# Patient Record
Sex: FEMALE | Race: BLACK OR AFRICAN AMERICAN | NOT HISPANIC OR LATINO | Employment: FULL TIME | URBAN - METROPOLITAN AREA
[De-identification: names, ages, dates, MRNs, and addresses within clinical notes are randomized per-mention and may not be internally consistent; named-entity substitution may affect disease eponyms.]

---

## 2017-04-27 ENCOUNTER — PATIENT MESSAGE (OUTPATIENT)
Dept: OBSTETRICS AND GYNECOLOGY | Facility: CLINIC | Age: 27
End: 2017-04-27

## 2017-05-04 ENCOUNTER — TELEPHONE (OUTPATIENT)
Dept: OBSTETRICS AND GYNECOLOGY | Facility: CLINIC | Age: 27
End: 2017-05-04

## 2017-05-04 NOTE — TELEPHONE ENCOUNTER
----- Message from Carola Delgado sent at 5/4/2017 10:14 AM CDT -----  Contact: HARRISON ANDERSEN [[id number]]  _  1st Request  _  2nd Request  x_  3rd Request        Who: HARRISON ANDERSEN [[id number]]    Why: Pt states she would like to schedule her annual exam.     What Number to Call Back: [phone]    When to Expect a call back: (Before the end of the day)   -- if call after 3:00 call back will be tomorrow.

## 2017-05-29 ENCOUNTER — OFFICE VISIT (OUTPATIENT)
Dept: OBSTETRICS AND GYNECOLOGY | Facility: CLINIC | Age: 27
End: 2017-05-29
Payer: COMMERCIAL

## 2017-05-29 VITALS
SYSTOLIC BLOOD PRESSURE: 102 MMHG | WEIGHT: 126.31 LBS | HEIGHT: 66 IN | DIASTOLIC BLOOD PRESSURE: 70 MMHG | BODY MASS INDEX: 20.3 KG/M2

## 2017-05-29 DIAGNOSIS — Z01.419 ENCOUNTER FOR GYNECOLOGICAL EXAMINATION WITHOUT ABNORMAL FINDING: Primary | ICD-10-CM

## 2017-05-29 DIAGNOSIS — B00.9 HSV INFECTION: ICD-10-CM

## 2017-05-29 PROCEDURE — 99395 PREV VISIT EST AGE 18-39: CPT | Mod: S$GLB,,, | Performed by: OBSTETRICS & GYNECOLOGY

## 2017-05-29 PROCEDURE — 99999 PR PBB SHADOW E&M-EST. PATIENT-LVL III: CPT | Mod: PBBFAC,,, | Performed by: OBSTETRICS & GYNECOLOGY

## 2017-05-29 RX ORDER — MEDROXYPROGESTERONE ACETATE 150 MG/ML
INJECTION, SUSPENSION INTRAMUSCULAR
Qty: 1 ML | Refills: 3 | Status: SHIPPED | OUTPATIENT
Start: 2017-05-29 | End: 2018-05-25 | Stop reason: SDUPTHER

## 2017-05-29 RX ORDER — MEDROXYPROGESTERONE ACETATE 150 MG/ML
INJECTION, SUSPENSION INTRAMUSCULAR
Refills: 3 | COMMUNITY
Start: 2017-03-25 | End: 2017-05-29

## 2017-05-29 RX ORDER — TETANUS TOXOID, REDUCED DIPHTHERIA TOXOID AND ACELLULAR PERTUSSIS VACCINE, ADSORBED 5; 2.5; 8; 8; 2.5 [IU]/.5ML; [IU]/.5ML; UG/.5ML; UG/.5ML; UG/.5ML
SUSPENSION INTRAMUSCULAR
Refills: 0 | COMMUNITY
Start: 2017-02-26 | End: 2018-05-25

## 2017-05-29 RX ORDER — VALACYCLOVIR HYDROCHLORIDE 500 MG/1
TABLET, FILM COATED ORAL
Qty: 30 TABLET | Refills: 1 | Status: SHIPPED | OUTPATIENT
Start: 2017-05-29 | End: 2018-05-25 | Stop reason: SDUPTHER

## 2017-05-29 NOTE — PROGRESS NOTES
"CC: Well woman exam    Lela Garcia is a 27 y.o. female  presents for a well woman exam.  She has no issues, problems, or complaints.  Content with Depo and prefers to continue.  Takes Valtrex prn for outbreaks.  Needs refill.    Last pap 2016--normal    Past Medical History:   Diagnosis Date    Seasonal allergies        Past Surgical History:   Procedure Laterality Date    PELVIC LAPAROSCOPY      removal of IUD       OB History    Para Term  AB Living   0        SAB TAB Ectopic Multiple Live Births                    Family History   Problem Relation Age of Onset    Hypertension Maternal Grandmother     Heart disease Maternal Grandfather     Breast cancer Paternal Grandmother     Hypertension Paternal Grandmother     Glaucoma Paternal Grandmother     Diabetes Paternal Grandmother     Heart disease Paternal Grandmother     Hypertension Mother     Glaucoma Mother     Diabetes Mother     Cancer Neg Hx     Colon cancer Neg Hx     Ovarian cancer Neg Hx        Social History   Substance Use Topics    Smoking status: Never Smoker    Smokeless tobacco: Never Used    Alcohol use Yes      Comment: occasional       /70   Ht 5' 6" (1.676 m)   Wt 57.3 kg (126 lb 5.2 oz)   BMI 20.39 kg/m²     ROS:  GENERAL: Denies weight gain or weight loss. Feeling well overall.   SKIN: Denies rash or lesions.   HEAD: Denies head injury or headache.   NODES: Denies enlarged lymph nodes.   CHEST: Denies chest pain or shortness of breath.   CARDIOVASCULAR: Denies palpitations or left sided chest pain.   ABDOMEN: No abdominal pain, constipation, diarrhea, nausea, vomiting or rectal bleeding.   URINARY: No frequency, dysuria, hematuria, or burning on urination.  REPRODUCTIVE: See HPI.   BREASTS: The patient performs breast self-examination and denies pain, lumps, or nipple discharge.   HEMATOLOGIC: No easy bruisability or excessive bleeding.  MUSCULOSKELETAL: Denies joint pain or swelling. "   NEUROLOGIC: Denies syncope or weakness.   PSYCHIATRIC: Denies depression, anxiety or mood swings.    Physical Exam:  APPEARANCE: Well nourished, well developed, in no acute distress.  AFFECT: WNL, alert and oriented x 3  SKIN: No acne or hirsutism  NECK: Neck symmetric without masses or thyromegaly  NODES: No inguinal, cervical, axillary, or femoral lymph node enlargement  CHEST: Good respiratory effect  ABDOMEN: Soft.  No tenderness or masses.  No hepatosplenomegaly.  No hernias.  BREASTS: Symmetrical, no skin changes or visible lesions.  No palpable masses, nipple discharge bilaterally.  Fibrocystic changes equal and bilateral.  PELVIC: Normal external genitalia without lesions.  Normal hair distribution.  Adequate perineal body, normal urethral meatus.  Vagina moist and well rugated without lesions or discharge.  Cervix pink, without lesions, discharge or tenderness.  No significant cystocele or rectocele.  Bimanual exam shows uterus to be normal size, regular, mobile and nontender.  Adnexa without masses or tenderness.    EXTREMITIES: No edema.    ASSESSMENT AND PLAN  1. Encounter for gynecological examination without abnormal finding     2. HSV infection  valacyclovir (VALTREX) 500 MG tablet       Patient was counseled today on diet, exercise and A.C.S. Pap guidelines and recommendations for yearly pelvic exams, mammograms and monthly self breast exams; to see her PCP for other health maintenance. Next pap due 2019.    Return in about 1 year (around 5/29/2018).

## 2018-05-25 ENCOUNTER — OFFICE VISIT (OUTPATIENT)
Dept: OBSTETRICS AND GYNECOLOGY | Facility: CLINIC | Age: 28
End: 2018-05-25
Payer: COMMERCIAL

## 2018-05-25 ENCOUNTER — LAB VISIT (OUTPATIENT)
Dept: LAB | Facility: OTHER | Age: 28
End: 2018-05-25
Payer: COMMERCIAL

## 2018-05-25 VITALS
WEIGHT: 121.69 LBS | DIASTOLIC BLOOD PRESSURE: 64 MMHG | BODY MASS INDEX: 19.56 KG/M2 | SYSTOLIC BLOOD PRESSURE: 100 MMHG | HEIGHT: 66 IN

## 2018-05-25 DIAGNOSIS — Z11.3 SCREEN FOR STD (SEXUALLY TRANSMITTED DISEASE): ICD-10-CM

## 2018-05-25 DIAGNOSIS — Z01.419 ENCOUNTER FOR GYNECOLOGICAL EXAMINATION WITHOUT ABNORMAL FINDING: ICD-10-CM

## 2018-05-25 DIAGNOSIS — B00.9 HSV INFECTION: ICD-10-CM

## 2018-05-25 DIAGNOSIS — Z78.9 USES DEPO-PROVERA AS PRIMARY BIRTH CONTROL METHOD: ICD-10-CM

## 2018-05-25 DIAGNOSIS — Z01.419 ENCOUNTER FOR GYNECOLOGICAL EXAMINATION WITHOUT ABNORMAL FINDING: Primary | ICD-10-CM

## 2018-05-25 PROCEDURE — 86703 HIV-1/HIV-2 1 RESULT ANTBDY: CPT

## 2018-05-25 PROCEDURE — 99395 PREV VISIT EST AGE 18-39: CPT | Mod: SA,S$GLB,, | Performed by: NURSE PRACTITIONER

## 2018-05-25 PROCEDURE — 80074 ACUTE HEPATITIS PANEL: CPT

## 2018-05-25 PROCEDURE — 87491 CHLMYD TRACH DNA AMP PROBE: CPT

## 2018-05-25 PROCEDURE — 87510 GARDNER VAG DNA DIR PROBE: CPT

## 2018-05-25 PROCEDURE — 99999 PR PBB SHADOW E&M-EST. PATIENT-LVL III: CPT | Mod: PBBFAC,,, | Performed by: NURSE PRACTITIONER

## 2018-05-25 PROCEDURE — 87480 CANDIDA DNA DIR PROBE: CPT

## 2018-05-25 PROCEDURE — 36415 COLL VENOUS BLD VENIPUNCTURE: CPT

## 2018-05-25 PROCEDURE — 86592 SYPHILIS TEST NON-TREP QUAL: CPT

## 2018-05-25 RX ORDER — MEDROXYPROGESTERONE ACETATE 150 MG/ML
INJECTION, SUSPENSION INTRAMUSCULAR
Qty: 1 ML | Refills: 3 | Status: SHIPPED | OUTPATIENT
Start: 2018-05-25 | End: 2019-05-27

## 2018-05-25 RX ORDER — MEDROXYPROGESTERONE ACETATE 150 MG/ML
INJECTION, SUSPENSION INTRAMUSCULAR
Refills: 1 | COMMUNITY
Start: 2018-03-09 | End: 2018-05-25

## 2018-05-25 RX ORDER — VALACYCLOVIR HYDROCHLORIDE 500 MG/1
TABLET, FILM COATED ORAL
Qty: 30 TABLET | Refills: 1 | Status: SHIPPED | OUTPATIENT
Start: 2018-05-25 | End: 2019-05-27 | Stop reason: SDUPTHER

## 2018-05-25 NOTE — PROGRESS NOTES
CC: Annual  HPI: Pt is a 28 y.o.  female who presents for routine annual exam. She uses depo provera for contraception. She does want STD screening, including labs. No problems today. Needs depo and valtrex refill.     Last pap in 2016 WNL    ROS:  GENERAL: Feeling well overall. Denies fever or chills.   SKIN: Denies rash or lesions.   HEAD: Denies head injury or headache.   NODES: Denies enlarged lymph nodes.   CHEST: Denies chest pain or shortness of breath.   CARDIOVASCULAR: Denies palpitations or left sided chest pain.   ABDOMEN: No abdominal pain, constipation, diarrhea, nausea, vomiting or rectal bleeding.   URINARY: No dysuria, hematuria, or burning on urination.  REPRODUCTIVE: See HPI.   BREASTS: Denies pain, lumps, or nipple discharge.   HEMATOLOGIC: No easy bruisability or excessive bleeding.   MUSCULOSKELETAL: Denies joint pain or swelling.   NEUROLOGIC: Denies syncope or weakness.   PSYCHIATRIC: Denies depression, anxiety or mood swings.    PE:   APPEARANCE: Well nourished, well developed, Black or  female in no acute distress.  NODES: no cervical, supraclavicular, or inguinal lymphadenopathy  BREASTS: Symmetrical, no skin changes or visible lesions. No palpable masses, nipple discharge or adenopathy bilaterally.  ABDOMEN: Soft. No tenderness or masses. No distention. No hernias palpated. No CVA tenderness.  VULVA: No lesions. Normal external female genitalia.  URETHRAL MEATUS: Normal size and location, no lesions, no prolapse.  URETHRA: No masses, tenderness, or prolapse.  VAGINA: Moist. No lesions or lacerations noted. No abnormal discharge present. No odor present.   CERVIX: No lesions or discharge. No cervical motion tenderness.   UTERUS: Normal size, regular shape, mobile, non-tender.  ADNEXA: No tenderness. No fullness or masses palpated in the adnexal regions.   ANUS PERINEUM: Normal.      Diagnosis:  1. Encounter for gynecological examination without abnormal finding    2. Uses  Depo-Provera as primary birth control method    3. HSV infection    4. Screen for STD (sexually transmitted disease)        Plan:     Orders Placed This Encounter    C. trachomatis/N. gonorrhoeae by AMP DNA Cervix    Vaginosis Screen by DNA Probe    HIV-1 and HIV-2 antibodies    RPR    Hepatitis panel, acute    valACYclovir (VALTREX) 500 MG tablet    medroxyPROGESTERone (DEPO-PROVERA) 150 mg/mL Syrg     1. Full std panel  2. Pap current  3. Refill of depo and valtrex  -Depo Provera use and potential side effects including altered menstrual bleeding pattern, weight gain, increased fracture risk due to reversible osteoporosis;  -osteoporosis prevention, calcium supplementation, regular weight bearing exercise;  -STDs and prevention, including the HPV vaccine     Patient was counseled today on the new ACS guidelines for cervical cytology screening as well as the current recommendations for breast cancer screening. She was counseled to follow up with her PCP for other routine health maintenance. Counseling session lasted approximately 10 minutes, and all her questions were answered.    Follow-up with me in 1 year for routine exam; pap in 1 year.

## 2018-05-28 LAB
C TRACH DNA SPEC QL NAA+PROBE: NOT DETECTED
CANDIDA RRNA VAG QL PROBE: POSITIVE
G VAGINALIS RRNA GENITAL QL PROBE: POSITIVE
HAV IGM SERPL QL IA: NEGATIVE
HBV CORE IGM SERPL QL IA: NEGATIVE
HBV SURFACE AG SERPL QL IA: NEGATIVE
HCV AB SERPL QL IA: NEGATIVE
HIV 1+2 AB+HIV1 P24 AG SERPL QL IA: NEGATIVE
N GONORRHOEA DNA SPEC QL NAA+PROBE: NOT DETECTED
RPR SER QL: NORMAL
T VAGINALIS RRNA GENITAL QL PROBE: NEGATIVE

## 2018-05-29 RX ORDER — FLUCONAZOLE 150 MG/1
150 TABLET ORAL ONCE
Qty: 1 TABLET | Refills: 1 | Status: SHIPPED | OUTPATIENT
Start: 2018-05-29 | End: 2018-05-29

## 2018-05-29 RX ORDER — TINIDAZOLE 500 MG/1
2 TABLET ORAL DAILY
Qty: 8 TABLET | Refills: 0 | Status: SHIPPED | OUTPATIENT
Start: 2018-05-29 | End: 2018-05-31

## 2018-06-15 ENCOUNTER — HOSPITAL ENCOUNTER (OUTPATIENT)
Dept: RADIOLOGY | Facility: OTHER | Age: 28
Discharge: HOME | End: 2018-06-15
Attending: INTERNAL MEDICINE
Payer: COMMERCIAL

## 2018-06-15 ENCOUNTER — OFFICE VISIT (OUTPATIENT)
Dept: INTERNAL MEDICINE | Facility: CLINIC | Age: 28
End: 2018-06-15
Attending: INTERNAL MEDICINE
Payer: COMMERCIAL

## 2018-06-15 VITALS
WEIGHT: 122.13 LBS | DIASTOLIC BLOOD PRESSURE: 70 MMHG | OXYGEN SATURATION: 98 % | TEMPERATURE: 98 F | HEART RATE: 85 BPM | BODY MASS INDEX: 19.63 KG/M2 | SYSTOLIC BLOOD PRESSURE: 120 MMHG | HEIGHT: 66 IN

## 2018-06-15 DIAGNOSIS — R07.89 CHEST WALL PAIN, CHRONIC: ICD-10-CM

## 2018-06-15 DIAGNOSIS — G89.29 CHEST WALL PAIN, CHRONIC: ICD-10-CM

## 2018-06-15 DIAGNOSIS — Z00.00 ANNUAL PHYSICAL EXAM: Primary | ICD-10-CM

## 2018-06-15 PROCEDURE — 99999 PR PBB SHADOW E&M-EST. PATIENT-LVL IV: CPT | Mod: PBBFAC,,, | Performed by: INTERNAL MEDICINE

## 2018-06-15 PROCEDURE — 71046 X-RAY EXAM CHEST 2 VIEWS: CPT | Mod: 26,,, | Performed by: RADIOLOGY

## 2018-06-15 PROCEDURE — 71110 X-RAY EXAM RIBS BIL 3 VIEWS: CPT | Mod: 26,,, | Performed by: RADIOLOGY

## 2018-06-15 PROCEDURE — 71110 X-RAY EXAM RIBS BIL 3 VIEWS: CPT | Mod: TC,FY

## 2018-06-15 PROCEDURE — 99385 PREV VISIT NEW AGE 18-39: CPT | Mod: S$GLB,,, | Performed by: INTERNAL MEDICINE

## 2018-06-15 PROCEDURE — 71046 X-RAY EXAM CHEST 2 VIEWS: CPT | Mod: TC,FY

## 2018-06-15 RX ORDER — CYCLOBENZAPRINE HCL 5 MG
5 TABLET ORAL 3 TIMES DAILY PRN
Qty: 30 TABLET | Refills: 0 | Status: SHIPPED | OUTPATIENT
Start: 2018-06-15 | End: 2018-06-25

## 2018-06-15 NOTE — PROGRESS NOTES
Subjective:       Patient ID: Lela Garcia is a 28 y.o. female.    Chief Complaint: Establish Care and Chest Pain    Here to establish care    3 year Hx of right chest wall pain that has become more frequent and ijn the last 2weeks more constant. Before she only felt pain when she would lean over to the right and lean on herself in a certain way. She states she was able to participate in daily activities and exercise without any regular or consistent trigger. She denies any alleviating factors. She denies association with PO intake. She denies exacerbation by coughing, sneezing, laughing, or having a BM. 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 Her pains started prior to this. She reports for the past 2 weeks her area of pain has become more constant in nature and is much more sensitive than normal. She denies night sweats, unexplained weight loss, N/v, diarrhea, easy bruising or bleeding.      She reports regular allergies for which she takes zyrtec near daily for rhinorrhea, itchy eyes. She denies rash.        Review of Systems   Constitutional: Negative for activity change and unexpected weight change.   HENT: Negative for hearing loss, rhinorrhea and trouble swallowing.    Eyes: Negative for discharge and visual disturbance.   Respiratory: Negative for chest tightness and wheezing.    Cardiovascular: Positive for chest pain. Negative for palpitations.   Gastrointestinal: Negative for blood in stool, constipation, diarrhea and vomiting.   Endocrine: Negative for polydipsia and polyuria.   Genitourinary: Negative for difficulty urinating, dysuria, hematuria and menstrual problem.   Musculoskeletal: Negative for arthralgias, joint swelling and neck pain.   Neurological: Negative for weakness and headaches.   Psychiatric/Behavioral: Negative for confusion and dysphoric mood.       Past Medical History:   Diagnosis Date    Seasonal allergies      Past Surgical History:   Procedure  "Laterality Date    PELVIC LAPAROSCOPY      removal of IUD     Family History   Problem Relation Age of Onset    Hypertension Maternal Grandmother     Heart disease Maternal Grandfather     Breast cancer Paternal Grandmother     Hypertension Paternal Grandmother     Glaucoma Paternal Grandmother     Diabetes Paternal Grandmother     Heart disease Paternal Grandmother     Hypertension Mother     Glaucoma Mother     Diabetes Mother     Cancer Neg Hx     Colon cancer Neg Hx     Ovarian cancer Neg Hx      Social History     Social History    Marital status: Single     Spouse name: N/A    Number of children: N/A    Years of education: N/A     Occupational History    Not on file.     Social History Main Topics    Smoking status: Never Smoker    Smokeless tobacco: Never Used    Alcohol use Yes      Comment: occasional    Drug use: No    Sexual activity: Yes     Partners: Male     Birth control/ protection: Injection     Other Topics Concern    Not on file     Social History Narrative    No narrative on file         Objective:      Vitals:    06/15/18 1427   BP: 120/70   BP Location: Right arm   Pulse: 85   Temp: 98.4 °F (36.9 °C)   SpO2: 98%   Weight: 55.4 kg (122 lb 2.2 oz)   Height: 5' 6" (1.676 m)      Physical Exam   Constitutional: She is oriented to person, place, and time. She appears well-developed and well-nourished. No distress.   HENT:   Head: Normocephalic and atraumatic.   Mouth/Throat: Oropharynx is clear and moist. No oropharyngeal exudate.   Eyes: Conjunctivae and EOM are normal. Pupils are equal, round, and reactive to light. No scleral icterus.   Neck: No thyromegaly present.   Cardiovascular: Normal rate, regular rhythm and normal heart sounds.    No murmur heard.  Pulmonary/Chest: Effort normal and breath sounds normal. She has no wheezes. She has no rales.       Abdominal: Soft. She exhibits no distension. There is no tenderness.   Musculoskeletal: She exhibits no edema or " tenderness.   Lymphadenopathy:     She has no cervical adenopathy.   Neurological: She is alert and oriented to person, place, and time.   Skin: Skin is warm and dry.   Psychiatric: She has a normal mood and affect. Her behavior is normal.       Assessment:       1. Annual physical exam    2. Chest wall pain, chronic        Plan:       Lela was seen today for establish care and chest pain.    Diagnoses and all orders for this visit:    Annual physical exam  -     CBC auto differential; Future  -     Comprehensive metabolic panel; Future  -     TSH; Future  -     Lipid panel; Future  -     Hemoglobin A1c; Future    Chest wall pain, chronic  Quite tender on exam. Hx most consistent with MSK etiology though not entirely clear. No pleuritic or chronic lung symptoms. Likely posture related core muscle Rec scheduled ibuprofen 3-4 tablets 3 times a day for minimum of 2 weeks. H2 blocker use discussed. Flexeril prn. Start with plain films then CT of chest if inconclusive. No need for immediate return visit unless symptoms change. Office and Emergency Department prompts discussed. Will be sequences of imaging, possibly additional blood work for auto immune causes and if symptoms unresolved or unrevealing pt to see ortho +/- rheum   -     X-Ray Chest PA And Lateral; Future  -     X-Ray Ribs 3 Views Bilateral; Future  -     cyclobenzaprine (FLEXERIL) 5 MG tablet; Take 1 tablet (5 mg total) by mouth 3 (three) times daily as needed.       RTC in 6 months or sooner prn.          Side effects of medication(s) were discussed in detail and patient voiced understanding.  Patient will call back for any issues or complications.

## 2018-06-18 ENCOUNTER — TELEPHONE (OUTPATIENT)
Dept: INTERNAL MEDICINE | Facility: CLINIC | Age: 28
End: 2018-06-18

## 2018-06-18 DIAGNOSIS — R07.82 INTERCOSTAL PAIN: ICD-10-CM

## 2018-06-18 NOTE — TELEPHONE ENCOUNTER
Pt is requesting ct scan. Order is not placed for this. Per notes pt to f/u with ct scan based off of xray results. Please advise and auth if appropriate.

## 2018-06-19 NOTE — TELEPHONE ENCOUNTER
Informed pt that her ct orders have been enter. Pt appt have already been schedule for June 20,2018 got 130pm

## 2018-06-20 ENCOUNTER — LAB VISIT (OUTPATIENT)
Dept: LAB | Facility: OTHER | Age: 28
End: 2018-06-20
Attending: INTERNAL MEDICINE
Payer: COMMERCIAL

## 2018-06-20 ENCOUNTER — TELEPHONE (OUTPATIENT)
Dept: INTERNAL MEDICINE | Facility: CLINIC | Age: 28
End: 2018-06-20

## 2018-06-20 DIAGNOSIS — R07.82 INTERCOSTAL PAIN: ICD-10-CM

## 2018-06-20 DIAGNOSIS — R07.81 RIB PAIN ON RIGHT SIDE: ICD-10-CM

## 2018-06-20 LAB
CRP SERPL-MCNC: 0.2 MG/L
ERYTHROCYTE [SEDIMENTATION RATE] IN BLOOD: 10 MM/HR

## 2018-06-20 PROCEDURE — 86039 ANTINUCLEAR ANTIBODIES (ANA): CPT

## 2018-06-20 PROCEDURE — 86038 ANTINUCLEAR ANTIBODIES: CPT

## 2018-06-20 PROCEDURE — 86235 NUCLEAR ANTIGEN ANTIBODY: CPT | Mod: 59

## 2018-06-20 PROCEDURE — 86431 RHEUMATOID FACTOR QUANT: CPT

## 2018-06-20 PROCEDURE — 85651 RBC SED RATE NONAUTOMATED: CPT

## 2018-06-20 PROCEDURE — 86140 C-REACTIVE PROTEIN: CPT

## 2018-06-20 PROCEDURE — 36415 COLL VENOUS BLD VENIPUNCTURE: CPT

## 2018-06-20 PROCEDURE — 86200 CCP ANTIBODY: CPT

## 2018-06-20 NOTE — TELEPHONE ENCOUNTER
"----- Message from Niki Alexandra sent at 6/20/2018 12:01 PM CDT -----  Contact: HARRISON ANDERSEN [[id number]]          Name of Who is Calling: HARRISON ANDERSEN [[id number]]      What is the request in detail:   Patient called requesting to schedule her CT Scan at [facility]. Says, "she would like to have the codes for this CT Scan sent there."  Please give a call back at your earliest convenience.     THANKS!      Can the clinic reply by [facility]:  No      What Number to Call Back: HARRISON ANDERSEN / # [phone]                                    "

## 2018-06-20 NOTE — TELEPHONE ENCOUNTER
Pt wants to have ct scan done at [facility]. External order for ct chest scan pended. Please advise and auth

## 2018-06-21 LAB
ANA SER QL IF: POSITIVE
ANA TITR SER IF: NORMAL {TITER}
CCP AB SER IA-ACNC: <0.5 U/ML
RHEUMATOID FACT SERPL-ACNC: <10 IU/ML

## 2018-06-21 NOTE — TELEPHONE ENCOUNTER
"----- Message from Lyndsey Michaels sent at 6/21/2018  8:44 AM CDT -----  Contact: pt  Name of Who is Calling: HARRISON ANDERSEN [[id number]]        What is the request in detail:   Patient called requesting to schedule her CT Scan at [facility]. Says, "she would like to have the codes for this CT Scan sent there."  Please give a call back at your earliest convenience.     THANKS!        Can the clinic reply by [facility]:  No        What Number to Call Back: HARRISON ANDERSEN / # [phone]     "

## 2018-06-22 ENCOUNTER — PATIENT MESSAGE (OUTPATIENT)
Dept: INTERNAL MEDICINE | Facility: CLINIC | Age: 28
End: 2018-06-22

## 2018-06-23 LAB
ANTI SM ANTIBODY: 1.91 EU
ANTI SM/RNP ANTIBODY: 8.77 EU
ANTI-SM INTERPRETATION: NEGATIVE
ANTI-SM/RNP INTERPRETATION: NEGATIVE
ANTI-SSA ANTIBODY: 6.51 EU
ANTI-SSA INTERPRETATION: NEGATIVE
ANTI-SSB ANTIBODY: 4.89 EU
ANTI-SSB INTERPRETATION: NEGATIVE
DSDNA AB SER-ACNC: NORMAL [IU]/ML

## 2018-06-25 ENCOUNTER — TELEPHONE (OUTPATIENT)
Dept: INTERNAL MEDICINE | Facility: CLINIC | Age: 28
End: 2018-06-25

## 2018-06-25 ENCOUNTER — PATIENT MESSAGE (OUTPATIENT)
Dept: INTERNAL MEDICINE | Facility: CLINIC | Age: 28
End: 2018-06-25

## 2018-06-25 NOTE — TELEPHONE ENCOUNTER
----- Message from Verito Rodriguez sent at 6/25/2018  9:21 AM CDT -----  Contact: pt            Name of Who is Calling: pt      What is the request in detail: pt needs ct scan order sent to [facility]. Following up on if order was faxed yet.  Please call pt      Can the clinic reply by [facility]: yes      What Number to Call Back if not in [facility]: [phone]

## 2018-06-25 NOTE — TELEPHONE ENCOUNTER
Printed out the ct of the chest and faxed it to [facility]. Sent a message via the portal to let pt know

## 2018-07-15 ENCOUNTER — PATIENT MESSAGE (OUTPATIENT)
Dept: INTERNAL MEDICINE | Facility: CLINIC | Age: 28
End: 2018-07-15

## 2018-11-14 ENCOUNTER — PATIENT MESSAGE (OUTPATIENT)
Dept: OBSTETRICS AND GYNECOLOGY | Facility: CLINIC | Age: 28
End: 2018-11-14

## 2018-11-14 ENCOUNTER — TELEPHONE (OUTPATIENT)
Dept: OBSTETRICS AND GYNECOLOGY | Facility: CLINIC | Age: 28
End: 2018-11-14

## 2018-11-14 RX ORDER — METRONIDAZOLE 7.5 MG/G
1 GEL VAGINAL NIGHTLY
Qty: 1 G | Refills: 0 | Status: SHIPPED | OUTPATIENT
Start: 2018-11-14 | End: 2018-11-21

## 2018-11-29 ENCOUNTER — PATIENT MESSAGE (OUTPATIENT)
Dept: INTERNAL MEDICINE | Facility: CLINIC | Age: 28
End: 2018-11-29

## 2018-12-14 ENCOUNTER — HOSPITAL ENCOUNTER (OUTPATIENT)
Dept: CARDIOLOGY | Facility: OTHER | Age: 28
Discharge: HOME | End: 2018-12-14
Attending: INTERNAL MEDICINE
Payer: COMMERCIAL

## 2018-12-14 ENCOUNTER — HOSPITAL ENCOUNTER (OUTPATIENT)
Dept: RADIOLOGY | Facility: OTHER | Age: 28
Discharge: HOME | End: 2018-12-14
Attending: INTERNAL MEDICINE
Payer: COMMERCIAL

## 2018-12-14 ENCOUNTER — OFFICE VISIT (OUTPATIENT)
Dept: INTERNAL MEDICINE | Facility: CLINIC | Age: 28
End: 2018-12-14
Attending: INTERNAL MEDICINE
Payer: COMMERCIAL

## 2018-12-14 VITALS
HEIGHT: 66 IN | DIASTOLIC BLOOD PRESSURE: 68 MMHG | OXYGEN SATURATION: 98 % | WEIGHT: 123.69 LBS | BODY MASS INDEX: 19.88 KG/M2 | HEART RATE: 105 BPM | SYSTOLIC BLOOD PRESSURE: 118 MMHG

## 2018-12-14 DIAGNOSIS — M79.10 MYALGIA: ICD-10-CM

## 2018-12-14 DIAGNOSIS — R53.83 FATIGUE, UNSPECIFIED TYPE: ICD-10-CM

## 2018-12-14 DIAGNOSIS — R06.09 DOE (DYSPNEA ON EXERTION): ICD-10-CM

## 2018-12-14 DIAGNOSIS — R76.8 POSITIVE ANA (ANTINUCLEAR ANTIBODY): ICD-10-CM

## 2018-12-14 DIAGNOSIS — Z00.00 ANNUAL PHYSICAL EXAM: Primary | ICD-10-CM

## 2018-12-14 LAB
AORTIC ROOT ANNULUS: 2.15 CM
AORTIC VALVE CUSP SEPERATION: 1.37 CM
ASCENDING AORTA: 2.31 CM
AV INDEX (PROSTH): 0.88
AV MEAN GRADIENT: 5.09 MMHG
AV PEAK GRADIENT: 8.88 MMHG
AV VALVE AREA: 2.67 CM2
BSA FOR ECHO PROCEDURE: 1.62 M2
CV ECHO LV RWT: 0.29 CM
DOP CALC AO PEAK VEL: 1.49 M/S
DOP CALC AO VTI: 29.67 CM
DOP CALC LVOT AREA: 3.05 CM2
DOP CALC LVOT DIAMETER: 1.97 CM
DOP CALC LVOT STROKE VOLUME: 79.33 CM3
DOP CALCLVOT PEAK VEL VTI: 26.04 CM
E WAVE DECELERATION TIME: 223.88 MSEC
E/A RATIO: 1.47
E/E' RATIO: 5.48
ECHO LV POSTERIOR WALL: 0.64 CM (ref 0.6–1.1)
FRACTIONAL SHORTENING: 35 % (ref 28–44)
INTERVENTRICULAR SEPTUM: 0.58 CM (ref 0.6–1.1)
IVRT: 0.07 MSEC
LA MAJOR: 4.08 CM
LA MINOR: 4.73 CM
LA WIDTH: 3.98 CM
LEFT ATRIUM SIZE: 3.02 CM
LEFT ATRIUM VOLUME INDEX: 27.4 ML/M2
LEFT ATRIUM VOLUME: 44.76 CM3
LEFT INTERNAL DIMENSION IN SYSTOLE: 2.88 CM (ref 2.1–4)
LEFT VENTRICLE DIASTOLIC VOLUME INDEX: 55.08 ML/M2
LEFT VENTRICLE DIASTOLIC VOLUME: 89.82 ML
LEFT VENTRICLE MASS INDEX: 48.4 G/M2
LEFT VENTRICLE SYSTOLIC VOLUME INDEX: 19.5 ML/M2
LEFT VENTRICLE SYSTOLIC VOLUME: 31.75 ML
LEFT VENTRICULAR INTERNAL DIMENSION IN DIASTOLE: 4.45 CM (ref 3.5–6)
LEFT VENTRICULAR MASS: 78.94 G
LV LATERAL E/E' RATIO: 5
LV SEPTAL E/E' RATIO: 6.07
MV PEAK A VEL: 0.58 M/S
MV PEAK E VEL: 0.85 M/S
PISA TR MAX VEL: 2.79 M/S
PULM VEIN S/D RATIO: 1.17
PV PEAK D VEL: 0.69 M/S
PV PEAK S VEL: 0.81 M/S
PV PEAK VELOCITY: 1.25 CM/S
RA MAJOR: 3.68 CM
RA WIDTH: 2.81 CM
RIGHT VENTRICULAR END-DIASTOLIC DIMENSION: 3.5 CM
RV TISSUE DOPPLER FREE WALL SYSTOLIC VELOCITY 1 (APICAL 4 CHAMBER VIEW): 16.76 M/S
SINUS: 2.43 CM
STJ: 2.06 CM
TDI LATERAL: 0.17
TDI SEPTAL: 0.14
TDI: 0.16
TR MAX PG: 31.14 MMHG
TRICUSPID ANNULAR PLANE SYSTOLIC EXCURSION: 2.21 CM

## 2018-12-14 PROCEDURE — 71046 X-RAY EXAM CHEST 2 VIEWS: CPT | Mod: TC,FY

## 2018-12-14 PROCEDURE — 99395 PREV VISIT EST AGE 18-39: CPT | Mod: S$GLB,,, | Performed by: INTERNAL MEDICINE

## 2018-12-14 PROCEDURE — 71046 X-RAY EXAM CHEST 2 VIEWS: CPT | Mod: 26,,, | Performed by: RADIOLOGY

## 2018-12-14 PROCEDURE — 93010 ELECTROCARDIOGRAM REPORT: CPT | Mod: ,,, | Performed by: INTERNAL MEDICINE

## 2018-12-14 PROCEDURE — 99999 PR PBB SHADOW E&M-EST. PATIENT-LVL III: CPT | Mod: PBBFAC,,, | Performed by: INTERNAL MEDICINE

## 2018-12-14 PROCEDURE — 93306 TTE W/DOPPLER COMPLETE: CPT

## 2018-12-14 PROCEDURE — 93306 TTE W/DOPPLER COMPLETE: CPT | Mod: 26,,, | Performed by: INTERNAL MEDICINE

## 2018-12-14 PROCEDURE — 93005 ELECTROCARDIOGRAM TRACING: CPT

## 2018-12-14 RX ORDER — CLOBETASOL PROPIONATE 0.5 MG/G
CREAM TOPICAL 2 TIMES DAILY
Qty: 30 G | Refills: 1 | Status: SHIPPED | OUTPATIENT
Start: 2018-12-14 | End: 2019-01-30

## 2018-12-14 NOTE — LETTER
December 14, 2018      [facility]  [address]  [facility] [address]  Phone: [phone]  Fax: [phone]       Patient: Lela Garcia   YOB: 1990  Date of Visit: 12/14/2018    To Whom It May Concern:    Samanta Garcia  was at [facility] on 12/14/2018. He  may return to work/school on 12/15/18. If you have any questions or concerns, or if I can be of further assistance, please do not hesitate to contact me.    Sincerely,    Josse Swift MA

## 2018-12-14 NOTE — PROGRESS NOTES
Subjective:       Patient ID: Lela Garcia is a 28 y.o. female.    Chief Complaint: Rash (torso)    Here    1 week after thanksgiving she developed fatigue, nasal congestion, constant nausea, loose stools diffuse myalgias, and exercise intolerance. She has a hx of food allergies and has always attributed these types of episodes to dietary non adherence. 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 She reports getting SOB with warm ups of activities that she has been doing several times a week for several years. She denies CP, PND, orthopnea, pleuritic CP, chronic cough. + cold intolerance historically but has been very warm and reports times of night sweats in recent past. She has also develoepd rash of torso that is similar to rashes she has had before but is more extensive then normal.        Rash   This is a new problem. The current episode started 1 to 4 weeks ago. The problem has been gradually worsening since onset. The affected locations include thetorso. The rash is characterized by itchiness. She was exposed to a new medication, shellfish and unknown. Associated symptoms include diarrhea and fatigue. Pertinent negatives include no anorexia, congestion, cough, eye pain, facial edema, fever, joint pain, nail changes, rhinorrhea, shortness of breath, sore throat or vomiting. Past treatments include antihistamine. The treatment provided no relief. Her past medical history is significant for allergies, eczema and varicella. There is no history of asthma.       Review of Systems   Constitutional: Positive for fatigue. Negative for fever.   HENT: Negative for congestion, rhinorrhea and sore throat.    Eyes: Negative for pain.   Respiratory: Negative for cough and shortness of breath.    Gastrointestinal: Positive for diarrhea. Negative for anorexia and vomiting.   Musculoskeletal: Negative for joint pain.   Skin:  "Positive for rash. Negative for nail changes.       Objective:      Vitals:    12/14/18 0828   BP: 118/68   Pulse: 105   SpO2: 98%   Weight: 56.1 kg (123 lb 10.9 oz)   Height: 5' 6" (1.676 m)      Physical Exam   Constitutional: She is oriented to person, place, and time. She appears well-developed and well-nourished. No distress.   HENT:   Head: Normocephalic and atraumatic.   Mouth/Throat: Oropharynx is clear and moist. No oropharyngeal exudate.   Eyes: Conjunctivae and EOM are normal. Pupils are equal, round, and reactive to light. No scleral icterus.   Neck: No thyromegaly present.   Cardiovascular: Normal rate, regular rhythm and normal heart sounds.   No murmur heard.  Pulmonary/Chest: Effort normal and breath sounds normal. She has no wheezes. She has no rales.   Abdominal: Soft. She exhibits no distension. There is no tenderness.   Musculoskeletal: She exhibits no edema or tenderness.        Right shoulder: She exhibits no tenderness and no bony tenderness.        Left shoulder: She exhibits no tenderness and no bony tenderness.        Right elbow: She exhibits normal range of motion. No tenderness found.        Left elbow: She exhibits normal range of motion. No tenderness found.        Right hand: Normal sensation noted. Normal strength noted.        Left hand: Normal sensation noted. Normal strength noted.   Lymphadenopathy:     She has no cervical adenopathy.   Neurological: She is alert and oriented to person, place, and time.   Skin: Skin is warm and dry.        Psychiatric: She has a normal mood and affect. Her behavior is normal.       Assessment:       1. Annual physical exam    2. Positive CHRYSTAL (antinuclear antibody)    3. Fatigue, unspecified type    4. Myalgia    5. GENTILE (dyspnea on exertion)        Plan:       Lela was seen today for rash.    Diagnoses and all orders for this visit:    Annual physical exam  -     Lipid panel; Future  -     Comprehensive metabolic panel; Future  -     CBC auto " differential; Future  -     Hemoglobin A1c; Future  -     Lipid panel  -     Comprehensive metabolic panel  -     CBC auto differential  -     Hemoglobin A1c    Positive CHRYSTAL (antinuclear antibody)  Has appt with Dr Barron in < 1 month  -     URINALYSIS  -     CHRYSTAL; Future  -     Aldolase; Future  -     CK; Future  -     Sedimentation rate; Future  -     C-reactive protein; Future  -     CHRYSTAL  -     Aldolase  -     CK  -     Sedimentation rate  -     C-reactive protein  -     Aldolase; Future  -     CK; Future  -     C-reactive protein; Future  -     CHRYSTAL; Future  -     ANTI-SSB ANTIBODY; Future  -     ANTI -SSA ANTIBODY; Future  -     TSH; Future  -     Aldolase  -     CK  -     C-reactive protein  -     CHRYSTAL  -     ANTI-SSB ANTIBODY  -     ANTI -SSA ANTIBODY  -     TSH    Fatigue, unspecified type  -     URINALYSIS  -     TSH; Future  -     Comprehensive metabolic panel; Future  -     CBC auto differential; Future  -     Hemoglobin A1c; Future  -     TSH  -     Comprehensive metabolic panel  -     CBC auto differential  -     Hemoglobin A1c  -     URINALYSIS  -     Hepatitis C antibody; Future  -     HIV 1/2 Ag/Ab (4th Gen); Future  -     Hepatitis B surface antigen; Future  -     Hepatitis C antibody  -     HIV 1/2 Ag/Ab (4th Gen)  -     Hepatitis B surface antigen    Myalgia    GENTILE (dyspnea on exertion)  -     X-Ray Chest PA And Lateral; Future  -     SCHEDULED EKG 12-LEAD (to Muse); Future  -     Transthoracic echo (TTE) complete (Cupid Only); Future    Other orders  -     clobetasol (TEMOVATE) 0.05 % cream; Apply topically 2 (two) times daily.             Side effects of medication(s) were discussed in detail and patient voiced understanding.  Patient will call back for any issues or complications.

## 2018-12-17 LAB
HBV SURFACE AG SERPL QL IA: NORMAL
HCV AB S/CO SERPL IA: 0.02
HCV AB SERPL QL IA: NORMAL
HIV 1+2 AB+HIV1 P24 AG SERPL QL IA: NORMAL

## 2018-12-18 DIAGNOSIS — R74.01 TRANSAMINITIS: ICD-10-CM

## 2018-12-18 DIAGNOSIS — R76.8 POSITIVE ANA (ANTINUCLEAR ANTIBODY): Primary | ICD-10-CM

## 2018-12-18 DIAGNOSIS — R79.89 DECREASED THYROID STIMULATING HORMONE (TSH) LEVEL: ICD-10-CM

## 2018-12-18 LAB
ALBUMIN SERPL-MCNC: 3.9 G/DL (ref 3.6–5.1)
ALBUMIN/GLOB SERPL: 1.4 (CALC) (ref 1–2.5)
ALDOLASE SERPL-CCNC: 7.5 U/L
ALP SERPL-CCNC: 48 U/L (ref 33–115)
ALT SERPL-CCNC: 283 U/L (ref 6–29)
ANA PAT SER IF-IMP: ABNORMAL
ANA SER QL IF: POSITIVE
ANA TITR SER IF: ABNORMAL TITER
AST SERPL-CCNC: 87 U/L (ref 10–30)
BILIRUB SERPL-MCNC: 1.5 MG/DL (ref 0.2–1.2)
BUN SERPL-MCNC: 12 MG/DL (ref 7–25)
BUN/CREAT SERPL: ABNORMAL (CALC) (ref 6–22)
CALCIUM SERPL-MCNC: 9.2 MG/DL (ref 8.6–10.2)
CHLORIDE SERPL-SCNC: 106 MMOL/L (ref 98–110)
CHOLEST SERPL-MCNC: 108 MG/DL
CHOLEST/HDLC SERPL: 2.1 (CALC)
CK SERPL-CCNC: 126 U/L (ref 29–143)
CO2 SERPL-SCNC: 23 MMOL/L (ref 20–32)
CREAT SERPL-MCNC: 0.65 MG/DL (ref 0.5–1.1)
CRP SERPL-MCNC: 0.7 MG/L
DSDNA AB SER-ACNC: 1 IU/ML
ENA SCL70 AB SER IA-ACNC: ABNORMAL AI
ENA SM AB SER IA-ACNC: ABNORMAL AI
ENA SM+RNP AB SER IA-ACNC: ABNORMAL AI
ENA SS-A AB SER IA-ACNC: ABNORMAL AI
ENA SS-B AB SER IA-ACNC: ABNORMAL AI
GFR SERPL CREATININE-BSD FRML MDRD: 121 ML/MIN/1.73M2
GLOBULIN SER CALC-MCNC: 2.8 G/DL (CALC) (ref 1.9–3.7)
GLUCOSE SERPL-MCNC: 74 MG/DL (ref 65–139)
HDLC SERPL-MCNC: 51 MG/DL
LDLC SERPL CALC-MCNC: 46 MG/DL (CALC)
NONHDLC SERPL-MCNC: 57 MG/DL (CALC)
POTASSIUM SERPL-SCNC: 3.9 MMOL/L (ref 3.5–5.3)
PROT SERPL-MCNC: 6.7 G/DL (ref 6.1–8.1)
SODIUM SERPL-SCNC: 140 MMOL/L (ref 135–146)
TRIGL SERPL-MCNC: 40 MG/DL
TSH SERPL-ACNC: <0.01 MIU/L

## 2018-12-22 ENCOUNTER — HOSPITAL ENCOUNTER (OUTPATIENT)
Dept: RADIOLOGY | Facility: OTHER | Age: 28
Discharge: HOME | End: 2018-12-22
Attending: INTERNAL MEDICINE
Payer: COMMERCIAL

## 2018-12-22 DIAGNOSIS — R74.01 TRANSAMINITIS: ICD-10-CM

## 2018-12-22 LAB
ALBUMIN SERPL-MCNC: 3.9 G/DL (ref 3.6–5.1)
ALBUMIN/GLOB SERPL: 1.7 (CALC) (ref 1–2.5)
ALP SERPL-CCNC: 52 U/L (ref 33–115)
ALT SERPL-CCNC: 162 U/L (ref 6–29)
AST SERPL-CCNC: 51 U/L (ref 10–30)
BASOPHILS # BLD AUTO: 22 CELLS/UL (ref 0–200)
BASOPHILS NFR BLD AUTO: 0.4 %
BILIRUB SERPL-MCNC: 0.6 MG/DL (ref 0.2–1.2)
BUN SERPL-MCNC: 9 MG/DL (ref 7–25)
BUN/CREAT SERPL: ABNORMAL (CALC) (ref 6–22)
CALCIUM SERPL-MCNC: 9.4 MG/DL (ref 8.6–10.2)
CHLORIDE SERPL-SCNC: 108 MMOL/L (ref 98–110)
CO2 SERPL-SCNC: 27 MMOL/L (ref 20–32)
CREAT SERPL-MCNC: 0.72 MG/DL (ref 0.5–1.1)
EOSINOPHIL # BLD AUTO: 70 CELLS/UL (ref 15–500)
EOSINOPHIL NFR BLD AUTO: 1.3 %
ERYTHROCYTE [DISTWIDTH] IN BLOOD BY AUTOMATED COUNT: 12 % (ref 11–15)
ERYTHROCYTE [SEDIMENTATION RATE] IN BLOOD BY WESTERGREN METHOD: 2 MM/H
GFR SERPL CREATININE-BSD FRML MDRD: 114 ML/MIN/1.73M2
GLOBULIN SER CALC-MCNC: 2.3 G/DL (CALC) (ref 1.9–3.7)
GLUCOSE SERPL-MCNC: 144 MG/DL (ref 65–139)
HBA1C MFR BLD: 5.3 % OF TOTAL HGB
HCT VFR BLD AUTO: 38.6 % (ref 35–45)
HGB BLD-MCNC: 12.8 G/DL (ref 11.7–15.5)
LYMPHOCYTES # BLD AUTO: 2840 CELLS/UL (ref 850–3900)
LYMPHOCYTES NFR BLD AUTO: 52.6 %
MCH RBC QN AUTO: 29.3 PG (ref 27–33)
MCHC RBC AUTO-ENTMCNC: 33.2 G/DL (ref 32–36)
MCV RBC AUTO: 88.3 FL (ref 80–100)
MITOCHONDRIA AB SER QL IF: NEGATIVE
MITOCHONDRIA AB TITR SER IF: NORMAL TITER
MONOCYTES # BLD AUTO: 572 CELLS/UL (ref 200–950)
MONOCYTES NFR BLD AUTO: 10.6 %
NEUTROPHILS # BLD AUTO: 1895 CELLS/UL (ref 1500–7800)
NEUTROPHILS NFR BLD AUTO: 35.1 %
PLATELET # BLD AUTO: 296 THOUSAND/UL (ref 140–400)
PMV BLD REES-ECKER: 12 FL (ref 7.5–12.5)
POTASSIUM SERPL-SCNC: 4.3 MMOL/L (ref 3.5–5.3)
PROT SERPL-MCNC: 6.2 G/DL (ref 6.1–8.1)
RBC # BLD AUTO: 4.37 MILLION/UL (ref 3.8–5.1)
SMOOTH MUSCLE AB SER QL IF: NEGATIVE
SODIUM SERPL-SCNC: 143 MMOL/L (ref 135–146)
T3FREE SERPL-MCNC: 4.3 PG/ML (ref 2.3–4.2)
T4 FREE SERPL-MCNC: 1.4 NG/DL (ref 0.8–1.8)
THYROGLOB AB SERPL-ACNC: <1 IU/ML
THYROPEROXIDASE AB SERPL-ACNC: 4 IU/ML
TSH SERPL-ACNC: 0.01 MIU/L
WBC # BLD AUTO: 5.4 THOUSAND/UL (ref 3.8–10.8)

## 2018-12-22 PROCEDURE — 76705 ECHO EXAM OF ABDOMEN: CPT | Mod: TC

## 2018-12-22 PROCEDURE — 76705 ECHO EXAM OF ABDOMEN: CPT | Mod: 26,,, | Performed by: RADIOLOGY

## 2018-12-24 ENCOUNTER — TELEPHONE (OUTPATIENT)
Dept: INTERNAL MEDICINE | Facility: CLINIC | Age: 28
End: 2018-12-24

## 2018-12-24 DIAGNOSIS — E05.90 HYPERTHYROIDISM: Primary | ICD-10-CM

## 2018-12-24 RX ORDER — ATENOLOL 25 MG/1
25 TABLET ORAL DAILY
Qty: 90 TABLET | Refills: 1 | Status: SHIPPED | OUTPATIENT
Start: 2018-12-24 | End: 2019-11-27 | Stop reason: SDUPTHER

## 2018-12-24 RX ORDER — METHIMAZOLE 5 MG/1
5 TABLET ORAL 2 TIMES DAILY
Qty: 180 TABLET | Refills: 0 | Status: SHIPPED | OUTPATIENT
Start: 2018-12-24 | End: 2019-04-19 | Stop reason: SDUPTHER

## 2018-12-24 NOTE — TELEPHONE ENCOUNTER
LVM for pt concerning labs consistent with hyperthryoidism.   Please contact pt to schedule endocrine appt.

## 2019-01-03 NOTE — PROGRESS NOTES
Subjective:      Patient ID: Harrison Garcia is a 28 y.o. female.    Chief Complaint:  Hyperthyroidism      History of Present Illness  Harrison Garcia presents today for Evaluation & management of subclinical hyperthyroldism. This is her first visit with me.     She was found to have subclinical hyperthyroidism on labs done by pcp     She presented with fatigue, nausea, low energy, muscle fatigue, cold & heat intolerance, sore muscles, occ diarrhea, itchy skin, rash on abdomen (since starting methimazole this resolved), sob, anxiety, tremor.     current symptoms  + palpations  - weight loss  + frequent bm  - Hair loss  - Brittle nails  + skin changes  + tremor   + anxiety    Current medication: Methimazole 5 mg BID & atenolol 25 mg daily (started on 12/26)     Denies Biotin usage      Any recent in the 6-12 months iodine or contrast? No     Thyroid tenderness?  No     [facility] Clinical Activity: 3/7=Active  Eye pain? no  Eye pain with movement? No   Eyelid erythema?no   Erythema of conjunctiva? No     Caruncle inflammation? no  Eyelid swelling? no    Results for HARRISON GARCIA (MRN [id number]) as of 1/7/2019 08:26   Ref. Range 12/19/2018 09:08   TSH Latest Units: mIU/L 0.01 (L)   T3, Free Latest Ref Range: 2.3 - 4.2 pg/mL 4.3 (H)   T4, Free Latest Ref Range: 0.8 - 1.8 ng/dL 1.4   Thyroglobulin Ab Screen Latest Ref Range: < or = 1 IU/mL <1     Patient also had a positive CHRYSTAL- she is scheduled to see rheumatology    Review of Systems   Constitutional: Positive for fatigue. Negative for unexpected weight change.   Eyes: Negative for visual disturbance.   Respiratory: Negative for cough and shortness of breath.    Cardiovascular: Positive for palpitations. Negative for chest pain.   Gastrointestinal: Positive for diarrhea. Negative for abdominal pain.   Endocrine: Positive for cold intolerance and heat intolerance. Negative for polydipsia, polyphagia and polyuria.   Musculoskeletal: Negative for  arthralgias.   Skin: Negative for wound.   Neurological: Negative for headaches.   Hematological: Does not bruise/bleed easily.   Psychiatric/Behavioral: Negative for sleep disturbance. The patient is nervous/anxious.      Objective:   Physical Exam   Constitutional: She appears well-developed.   HENT:   Right Ear: External ear normal.   Left Ear: External ear normal.   Nose: Nose normal.   Hearing Normal     Neck: No tracheal deviation present. No thyromegaly present.   Cardiovascular: Normal rate.   No murmur heard.  No edema present   Pulmonary/Chest: Effort normal and breath sounds normal.   Abdominal: Soft. She exhibits no mass. No hernia.   Neurological: She is alert. No cranial nerve deficit or sensory deficit.   Skin: No rash noted.   No nodules.   Psychiatric: She has a normal mood and affect. Judgment normal.   Vitals reviewed.    Body mass index is 19.96 kg/m².    Lab Review:   Lab Results   Component Value Date    HGBA1C 5.3 12/21/2018     Lab Results   Component Value Date    CHOL 108 12/14/2018    HDL 51 12/14/2018    LDLCALC 46 12/14/2018    TRIG 40 12/14/2018    CHOLHDL 2.1 12/14/2018     Lab Results   Component Value Date     12/19/2018    K 4.3 12/19/2018     12/19/2018    CO2 27 12/19/2018     (H) 12/19/2018    BUN 9 12/19/2018    CREATININE 0.72 12/19/2018    CALCIUM 9.4 12/19/2018    PROT 6.2 12/19/2018    ALBUMIN 3.9 12/19/2018    BILITOT 0.6 12/19/2018    ALKPHOS 52 12/19/2018    AST 51 (H) 12/19/2018     (H) 12/19/2018    ANIONGAP 9 06/15/2018    ESTGFRAFRICA 132 12/19/2018    EGFRNONAA 114 12/19/2018    TSH 0.01 (L) 12/19/2018     Assessment and Plan     1. Subclinical hyperthyroidism  NM Thyroid Uptake and Scan    Comprehensive metabolic panel    T4, free    Thyrotropin receptor antibody    TSH    Comprehensive metabolic panel    T4, free    TSH    CANCELED: TSH    CANCELED: T4, free    CANCELED: Comprehensive metabolic panel    CANCELED: Thyrotropin receptor  antibody     Subclinical hyperthyroidism  Biochemical evidence of subclinical hyperthyroidism.  Patient is currently symptomatic.     Risks and benefits discussed with patient.  Increased risk of mortality and/or conversion to overt hyperthyroidism when:  TSH <0.1, heart disease (heart failure, CMPY), arrhythmia (ie afib), bone disease, and underlying nodular thyroid disease.    Continue methimazole 5 mg BID & atenolol 25 mg daily   Thyroid uptake & scan ordered today. Patient alerted to stop taking methimazole 5 days prior to scan & restart afterwards    Discussed possible treatment options - observation, Methimazole, ESCALONA.   If we observe, patient is at risk for spontaneous resolution, stable disease, or progression to overt hyperthyroidism.  Patient should proceed with caution if he/she needs iodine load for imaging or requires iodine containing medication (ie. Amiodarone).  Medication side effects discussed. ESCALONA is associated with resolution of disease and/or progression to hypothyroidism.    Labs after 1/26 (4 weeks after starting methimazole with antibodies)         Follow-up in about 6 months (around 7/7/2019).

## 2019-01-07 ENCOUNTER — OFFICE VISIT (OUTPATIENT)
Dept: ENDOCRINOLOGY | Facility: CLINIC | Age: 29
End: 2019-01-07
Payer: COMMERCIAL

## 2019-01-07 VITALS
HEIGHT: 66 IN | HEART RATE: 64 BPM | RESPIRATION RATE: 18 BRPM | BODY MASS INDEX: 19.88 KG/M2 | WEIGHT: 123.69 LBS | SYSTOLIC BLOOD PRESSURE: 102 MMHG | DIASTOLIC BLOOD PRESSURE: 66 MMHG

## 2019-01-07 DIAGNOSIS — E05.90 SUBCLINICAL HYPERTHYROIDISM: Primary | ICD-10-CM

## 2019-01-07 PROCEDURE — 99204 OFFICE O/P NEW MOD 45 MIN: CPT | Mod: S$GLB,,, | Performed by: NURSE PRACTITIONER

## 2019-01-07 PROCEDURE — 99999 PR PBB SHADOW E&M-EST. PATIENT-LVL IV: ICD-10-PCS | Mod: PBBFAC,,, | Performed by: NURSE PRACTITIONER

## 2019-01-07 PROCEDURE — 99999 PR PBB SHADOW E&M-EST. PATIENT-LVL IV: CPT | Mod: PBBFAC,,, | Performed by: NURSE PRACTITIONER

## 2019-01-07 PROCEDURE — 3008F PR BODY MASS INDEX (BMI) DOCUMENTED: ICD-10-PCS | Mod: CPTII,S$GLB,, | Performed by: NURSE PRACTITIONER

## 2019-01-07 PROCEDURE — 3008F BODY MASS INDEX DOCD: CPT | Mod: CPTII,S$GLB,, | Performed by: NURSE PRACTITIONER

## 2019-01-07 PROCEDURE — 99204 PR OFFICE/OUTPT VISIT, NEW, LEVL IV, 45-59 MIN: ICD-10-PCS | Mod: S$GLB,,, | Performed by: NURSE PRACTITIONER

## 2019-01-07 NOTE — ASSESSMENT & PLAN NOTE
Biochemical evidence of subclinical hyperthyroidism.  Patient is currently symptomatic.     Risks and benefits discussed with patient.  Increased risk of mortality and/or conversion to overt hyperthyroidism when:  TSH <0.1, heart disease (heart failure, CMPY), arrhythmia (ie afib), bone disease, and underlying nodular thyroid disease.    Continue methimazole 5 mg BID & atenolol 25 mg daily   Thyroid uptake & scan ordered today. Patient alerted to stop taking methimazole 5 days prior to scan & restart afterwards    Discussed possible treatment options - observation, Methimazole, ESCALONA.   If we observe, patient is at risk for spontaneous resolution, stable disease, or progression to overt hyperthyroidism.  Patient should proceed with caution if he/she needs iodine load for imaging or requires iodine containing medication (ie. Amiodarone).  Medication side effects discussed. ESCALONA is associated with resolution of disease and/or progression to hypothyroidism.    Labs after 1/26 (4 weeks after starting methimazole with antibodies)

## 2019-01-07 NOTE — LETTER
January 7, 2019      Elkin Christianson MD  [address]  [address]  [facility] [address]           [facility]  [address]  [facility] [address]  Phone: [phone]          Patient: Lela Garcia   MR Number: [id number]   YOB: 1990   Date of Visit: 1/7/2019       Dear Dr. Elkin Christianson:    Thank you for referring Lela Garcia to me for evaluation. Attached you will find relevant portions of my assessment and plan of care.    If you have questions, please do not hesitate to call me. I look forward to following Lela Garcia along with you.    Sincerely,    Angelita Norwood, MARQUISE    Enclosure  CC:  No Recipients    If you would like to receive this communication electronically, please contact [email or web address] or [phone] to request more information on [employer] Link access.    For providers and/or their staff who would like to refer a patient to [facility], please contact us through our one-stop-shop provider referral line, [facility], at [phone].    If you feel you have received this communication in error or would no longer like to receive these types of communications, please e-mail [email or web address]

## 2019-01-10 ENCOUNTER — PATIENT MESSAGE (OUTPATIENT)
Dept: INTERNAL MEDICINE | Facility: CLINIC | Age: 29
End: 2019-01-10

## 2019-01-10 LAB
ALBUMIN SERPL-MCNC: 4.2 G/DL (ref 3.6–5.1)
ALBUMIN/GLOB SERPL: 1.5 (CALC) (ref 1–2.5)
ALP SERPL-CCNC: 57 U/L (ref 33–115)
ALT SERPL-CCNC: 32 U/L (ref 6–29)
AST SERPL-CCNC: 24 U/L (ref 10–30)
BILIRUB SERPL-MCNC: 0.8 MG/DL (ref 0.2–1.2)
BUN SERPL-MCNC: 13 MG/DL (ref 7–25)
BUN/CREAT SERPL: ABNORMAL (CALC) (ref 6–22)
CALCIUM SERPL-MCNC: 9.3 MG/DL (ref 8.6–10.2)
CHLORIDE SERPL-SCNC: 106 MMOL/L (ref 98–110)
CO2 SERPL-SCNC: 27 MMOL/L (ref 20–32)
CREAT SERPL-MCNC: 0.76 MG/DL (ref 0.5–1.1)
GFR SERPL CREATININE-BSD FRML MDRD: 107 ML/MIN/1.73M2
GLOBULIN SER CALC-MCNC: 2.8 G/DL (CALC) (ref 1.9–3.7)
GLUCOSE SERPL-MCNC: 96 MG/DL (ref 65–99)
POTASSIUM SERPL-SCNC: 4.4 MMOL/L (ref 3.5–5.3)
PROT SERPL-MCNC: 7 G/DL (ref 6.1–8.1)
SODIUM SERPL-SCNC: 139 MMOL/L (ref 135–146)
T4 FREE SERPL-MCNC: 1.2 NG/DL (ref 0.8–1.8)
TSH SERPL-ACNC: 0.54 MIU/L

## 2019-01-28 NOTE — PROGRESS NOTES
Subjective:     Patient ID: Lela Garcia is a 28 y.o. female sent for consultation on a + CHRYSTAL by  Dr. Christianson. The referral is dated 6/24/18 & was originally for rib pain.    Chief Complaint: No chief complaint on file.       HPI   28 yr old lady with eczema and multiple allergies and sensitivities--mostly for food but also environmental who had been having rib pain that began in March 2018, but is now resolved after she was dx with hyperthyroidism and begun on tapazole in late December 2018. At the time, she was also c/o some AM hand pain & puffiness in between PIPs & DIPs , had palpitations, diarrhea, anxiety and tremor and fatigue and a skin rash (pruritic on torso). An CHRYSTAL drawn in December was + 1:160 H with neg profile.    Her sxs have almost totally resolved on tapazole.  She denies AM stiffness, unusual joint pain, joint swelling, Raynaud's, dysphagia, tight skin, alopecia, oral ulcers, sicca symptoms, pleurisy, pericarditis, photosensitivity, miscarriages (0/0), thromboses. 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     There are no CTD in family but she does have a maternal aunt with hypothyrodism.          Current Outpatient Medications   Medication Sig Dispense Refill    atenolol (TENORMIN) 25 MG tablet Take 1 tablet (25 mg total) by mouth once daily. 90 tablet 1    cetirizine (ZYRTEC) 10 MG tablet Take 10 mg by mouth once daily.      medroxyPROGESTERone (DEPO-PROVERA) 150 mg/mL Syrg INJECT 1 ML INTO THE MUSCLE Q 3 MONTHS 1 mL 3    methIMAzole (TAPAZOLE) 5 MG Tab Take 1 tablet (5 mg total) by mouth 2 (two) times daily. 180 tablet 0    valACYclovir (VALTREX) 500 MG tablet TAKE 1 TABLET BY MOUTH TWICE DAILY AS NEEDED FOR OUTBREAK 30 tablet 1     No current facility-administered medications for this visit.          Review of patient's allergies indicates:   Allergen Reactions     Iodine and iodide containing products Shortness Of Breath    Shellfish containing products Other (See Comments)     Low BP       Review of Systems   Constitutional: Positive for fatigue. Negative for diaphoresis and fever.   HENT: Negative.  Negative for mouth sores, sore throat, tinnitus and trouble swallowing.    Eyes: Negative.  Negative for visual disturbance.   Respiratory: Negative.  Negative for cough, choking, chest tightness and shortness of breath.    Cardiovascular: Negative for chest pain, palpitations (before tapazole) and leg swelling.   Gastrointestinal: Positive for diarrhea. Negative for abdominal distention, abdominal pain, blood in stool, constipation, nausea and vomiting.        Food allergies;   Has a wheat allergy, shellfish, eggs, garlic, oranges.    Genitourinary: Negative for frequency, hematuria and menstrual problem.   Musculoskeletal: Negative for arthralgias, back pain, joint swelling, myalgias, neck pain and neck stiffness.   Skin: Negative.  Negative for rash.   Allergic/Immunologic: Positive for environmental allergies and food allergies.   Neurological: Negative.  Negative for dizziness, syncope, weakness, light-headedness and numbness.   Hematological: Negative for adenopathy. Does not bruise/bleed easily.   Psychiatric/Behavioral: Positive for sleep disturbance (some disturbance; ). Negative for dysphoric mood. The patient is not nervous/anxious.        Past Medical History:   Diagnosis Date    Seasonal allergies    Food allergies: cramps, diarrhea, HA, low energy; fatigue; sometime LBP (anaphylactic type response after 24 hrs); had testing by Ellie Hub[facility] on [facility].    Past Surgical History:   Procedure Laterality Date    LAPAROSCOPY-DIAGNOSTIC / REMOVAL MIGRATED IUD N/A 3/12/2015    Performed by Khushbu Winter [facility] at [facility]    PELVIC LAPAROSCOPY      removal of IUD       Family History   Problem Relation Age of Onset    Hypertension Maternal Grandmother   "   Heart disease Maternal Grandfather     Breast cancer Paternal Grandmother     Hypertension Paternal Grandmother     Glaucoma Paternal Grandmother     Diabetes Paternal Grandmother     Heart disease Paternal Grandmother     Hypertension Mother     Glaucoma Mother     Diabetes Mother     Cancer Neg Hx     Colon cancer Neg Hx     Ovarian cancer Neg Hx      M aunt hypothyroidism.   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     Objective:   /69   Pulse 67   Ht 5' 7.2" (1.707 m)   Wt 70.3 kg (155 lb)   BMI 24.13 kg/m²     Physical Exam   Vitals reviewed.  Constitutional: She is oriented to person, place, and time and well-developed, well-nourished, and in no distress. No distress.   HENT:   Head: Normocephalic and atraumatic.   Mouth/Throat: Oropharynx is clear and moist. No oropharyngeal exudate.   No facial rashes  Parotids not enlarged  No oral ulcers   Eyes: Conjunctivae and EOM are normal. Pupils are equal, round, and reactive to light. Right eye exhibits no discharge. Left eye exhibits no discharge. No scleral icterus.   Neck: Neck supple. No JVD present. No tracheal deviation present. No thyromegaly present.   Cardiovascular: Normal rate, regular rhythm, normal heart sounds and intact distal pulses.  Exam reveals no gallop and no friction rub.    No murmur heard.  Pulmonary/Chest: Effort normal and breath sounds normal. No respiratory distress. She has no wheezes. She has no rales. She exhibits no tenderness.   Abdominal: Soft. Bowel sounds are normal. She exhibits no distension and no mass. There is no splenomegaly or hepatomegaly. There is no tenderness. There is no rebound and no guarding. "   Lymphadenopathy:     She has no cervical adenopathy.        Right: No inguinal adenopathy present.        Left: No inguinal adenopathy present.   Neurological: She is alert and oriented to person, place, and time. She has normal reflexes. No cranial nerve deficit. Gait normal.   Proximal and distal muscle strength 5/5.   Skin: Skin is warm and dry. No rash noted. She is not diaphoretic.     Psychiatric: Mood, memory, affect and judgment normal.   Musculoskeletal: Normal range of motion. She exhibits no edema or tenderness.   Cspine FROM no tenderness  Tspine FROM no tenderness  Lspine FROM no tenderness.  TMJ: unremarkable  Shoulders: hyperextensible; no synovitis;  Elbows: FROM; no synovitis; no tophi or nodules  Wrists: FROM; no synovitis;    MCPs: FROM; no synovitis; no metacarpalgia;  ok;  PIPs:FROM; no synovitis;   DIPs: FROM; no synovitis;   HIPS: FROM  Knees: FROM; no synovitis; no instability;  Ankles: FROM: no synovitis   Toes: ok; no metatarsalgia    Beighton score =1 (palms to floor only)             1/9/19: CMP ALT 32 (29);   12/21/18: ESR 2; CRPCBC ok;   12/14/18: CHRYSTAL 1:160H; neg SSA; neg SSB; neg Sm/RNP neg; Scl-70 neg; AMA neg;   Assessment:   Positive CHRYSTAL   No connective tissue disorder   Probably secondary to hyperthyroidism  Hyperthyroidism   On tapazole  Hypermobile shoulders  Multiple allergies and hypersensitivities    Plan:   Patient educated on meaning of +CHRYSTAL.  Reassured.  Discussed sensitivities and benefits of exercise as she is doing.  Will get vitamin D level as patient requested as per her dentist due to dental caries.  RTC prn    CC: Elkin Christianson MD

## 2019-01-30 ENCOUNTER — INITIAL CONSULT (OUTPATIENT)
Dept: RHEUMATOLOGY | Facility: CLINIC | Age: 29
End: 2019-01-30
Payer: COMMERCIAL

## 2019-01-30 VITALS
WEIGHT: 155 LBS | DIASTOLIC BLOOD PRESSURE: 69 MMHG | BODY MASS INDEX: 24.33 KG/M2 | SYSTOLIC BLOOD PRESSURE: 101 MMHG | HEART RATE: 67 BPM | HEIGHT: 67 IN

## 2019-01-30 DIAGNOSIS — Z55.9 EDUCATIONAL CIRCUMSTANCE: ICD-10-CM

## 2019-01-30 DIAGNOSIS — R53.83 FATIGUE, UNSPECIFIED TYPE: ICD-10-CM

## 2019-01-30 DIAGNOSIS — R76.8 POSITIVE ANA (ANTINUCLEAR ANTIBODY): Primary | ICD-10-CM

## 2019-01-30 DIAGNOSIS — E05.90 HYPERTHYROIDISM: ICD-10-CM

## 2019-01-30 DIAGNOSIS — M35.7 SHOULDER JOINT HYPERMOBILITY: ICD-10-CM

## 2019-01-30 PROCEDURE — 99999 PR PBB SHADOW E&M-EST. PATIENT-LVL III: CPT | Mod: PBBFAC,,, | Performed by: INTERNAL MEDICINE

## 2019-01-30 PROCEDURE — 99244 PR OFFICE CONSULTATION,LEVEL IV: ICD-10-PCS | Mod: S$GLB,,, | Performed by: INTERNAL MEDICINE

## 2019-01-30 PROCEDURE — 99244 OFF/OP CNSLTJ NEW/EST MOD 40: CPT | Mod: S$GLB,,, | Performed by: INTERNAL MEDICINE

## 2019-01-30 PROCEDURE — 99999 PR PBB SHADOW E&M-EST. PATIENT-LVL III: ICD-10-PCS | Mod: PBBFAC,,, | Performed by: INTERNAL MEDICINE

## 2019-01-30 SDOH — SOCIAL DETERMINANTS OF HEALTH (SDOH): PROBLEMS RELATED TO EDUCATION AND LITERACY, UNSPECIFIED: Z55.9

## 2019-02-07 ENCOUNTER — HOSPITAL ENCOUNTER (OUTPATIENT)
Dept: RADIOLOGY | Facility: HOSPITAL | Age: 29
Discharge: HOME | End: 2019-02-07
Attending: NURSE PRACTITIONER
Payer: COMMERCIAL

## 2019-02-07 DIAGNOSIS — E05.90 SUBCLINICAL HYPERTHYROIDISM: ICD-10-CM

## 2019-02-07 PROCEDURE — 78014 NM THYROID UPTAKE AND SCAN: ICD-10-PCS | Mod: 26,,, | Performed by: RADIOLOGY

## 2019-02-07 PROCEDURE — 78014 THYROID IMAGING W/BLOOD FLOW: CPT | Mod: 26,,, | Performed by: RADIOLOGY

## 2019-02-07 PROCEDURE — A9516 IODINE I-123 SOD IODIDE MIC: HCPCS

## 2019-02-08 ENCOUNTER — HOSPITAL ENCOUNTER (OUTPATIENT)
Dept: RADIOLOGY | Facility: HOSPITAL | Age: 29
Discharge: HOME | End: 2019-02-08
Attending: NURSE PRACTITIONER
Payer: COMMERCIAL

## 2019-02-08 ENCOUNTER — PATIENT MESSAGE (OUTPATIENT)
Dept: ENDOCRINOLOGY | Facility: CLINIC | Age: 29
End: 2019-02-08

## 2019-02-11 ENCOUNTER — PATIENT MESSAGE (OUTPATIENT)
Dept: ENDOCRINOLOGY | Facility: CLINIC | Age: 29
End: 2019-02-11

## 2019-02-12 LAB
1,25(OH)2D SERPL-MCNC: 83 PG/ML (ref 18–72)
1,25(OH)2D2 SERPL-MCNC: <8 PG/ML
1,25(OH)2D3 SERPL-MCNC: 83 PG/ML

## 2019-02-15 ENCOUNTER — PATIENT MESSAGE (OUTPATIENT)
Dept: RHEUMATOLOGY | Facility: CLINIC | Age: 29
End: 2019-02-15

## 2019-04-22 RX ORDER — METHIMAZOLE 5 MG/1
TABLET ORAL
Qty: 180 TABLET | Refills: 0 | Status: SHIPPED | OUTPATIENT
Start: 2019-04-22 | End: 2019-08-31 | Stop reason: SDUPTHER

## 2019-04-29 ENCOUNTER — OFFICE VISIT (OUTPATIENT)
Dept: GASTROENTEROLOGY | Facility: CLINIC | Age: 29
End: 2019-04-29
Payer: COMMERCIAL

## 2019-04-29 VITALS
SYSTOLIC BLOOD PRESSURE: 131 MMHG | HEIGHT: 65 IN | BODY MASS INDEX: 21.86 KG/M2 | WEIGHT: 131.19 LBS | DIASTOLIC BLOOD PRESSURE: 86 MMHG | HEART RATE: 74 BPM

## 2019-04-29 DIAGNOSIS — K52.9 CHRONIC DIARRHEA: Primary | ICD-10-CM

## 2019-04-29 DIAGNOSIS — K82.4 GALLBLADDER POLYP: ICD-10-CM

## 2019-04-29 DIAGNOSIS — T78.1XXA FOOD SENSITIVITY WITH GASTROINTESTINAL SYMPTOMS: ICD-10-CM

## 2019-04-29 DIAGNOSIS — R14.0 BLOATING: ICD-10-CM

## 2019-04-29 PROCEDURE — 99999 PR PBB SHADOW E&M-EST. PATIENT-LVL III: ICD-10-PCS | Mod: PBBFAC,,, | Performed by: INTERNAL MEDICINE

## 2019-04-29 PROCEDURE — 99205 PR OFFICE/OUTPT VISIT, NEW, LEVL V, 60-74 MIN: ICD-10-PCS | Mod: S$GLB,,, | Performed by: INTERNAL MEDICINE

## 2019-04-29 PROCEDURE — 3008F BODY MASS INDEX DOCD: CPT | Mod: CPTII,S$GLB,, | Performed by: INTERNAL MEDICINE

## 2019-04-29 PROCEDURE — 3008F PR BODY MASS INDEX (BMI) DOCUMENTED: ICD-10-PCS | Mod: CPTII,S$GLB,, | Performed by: INTERNAL MEDICINE

## 2019-04-29 PROCEDURE — 99999 PR PBB SHADOW E&M-EST. PATIENT-LVL III: CPT | Mod: PBBFAC,,, | Performed by: INTERNAL MEDICINE

## 2019-04-29 PROCEDURE — 99205 OFFICE O/P NEW HI 60 MIN: CPT | Mod: S$GLB,,, | Performed by: INTERNAL MEDICINE

## 2019-04-29 NOTE — PROGRESS NOTES
" [facility] Consultation Note    Reason for Consult:    Chief Complaint   Patient presents with    Abdominal Pain    Bloated       PCP:   Elkin Christianson    Referring MD:  Elkin Christianson Md  [address]  [address]  [place] [address]    HPI:  Lela Garcia is a 28 y.o. female here for evaluation of digestive problems.  She is new to our clinic and has not seen a GI doctor before.  She reports having had problems with her stomach and bowels most of her life.  She has diarrhea which she qualifies as soft stools.  She has multiple stools over a short period of time.  She has rare episodes of constipation.  Bowel movements are associated with pain, cramping, and occasional nausea without emesis.  She denies fecal urgency.  She has improvement in abdominal pain with BM.      She saw an allergist and had testing revealing several food sensitivities that include wheal, dairy and soy.  She is on a "whole 30" diet and feels improved with this, but still has stomach noises, cramping, and loose stools.  She also tried probiotics which seem to help, but cannot recall the brand.  Other foods that cause problems are brussel sprouts, onions, quinoa.            ROS:  Constitutional: No fevers, chills, No weight loss, normal appetite  ENT: No congestion, rhinorrhea, or chronic sinus problems  CV: No chest pain or palpitations  Pulm: No cough, No shortness of breath  Ophtho: No vision changes or pain  GI: see HPI  Derm: No rash or lesions  Heme: No lymphadenopathy, No bruising  MSK: No arthritis or joint swelling  : No dysuria, No frequent urination  Endo: No hot or cold intolerance  Neuro: No syncope, No seizure        Medical History:  has a past medical history of Seasonal allergies.    Surgical History:  has a past surgical history that includes Pelvic laparoscopy.    Family History: family history includes Breast cancer in her paternal grandmother; Diabetes in her mother and " "paternal grandmother; Glaucoma in her mother and paternal grandmother; Heart disease in her maternal grandfather and paternal grandmother; Hypertension in her maternal grandmother, mother, and paternal grandmother.    Social History:  reports that she has never smoked. She has never used smokeless tobacco. She reports that she drinks alcohol. She reports that she does not use drugs.    Review of patient's allergies indicates:   Allergen Reactions    Iodine and iodide containing products Shortness Of Breath    Shellfish containing products Other (See Comments)     Low BP    Allergen ext-venom-honey bee     Frederick     Cherries     Cocoa     Egg derived     Flowers     Garlic     Grass pollen-shawna grass standard     Grass pollen-orchardgrass, standard     Grass pollen-red top, standard     Grass pollen-sweet vernal grass, std.     Hornet venom     Milk containing products     Oats (sudhakar)     Orange     Pineapple     Soy     Tree pollen-sharon     Wasp venom     Watermelon     Weed pollen     Wheat containing prod     Yellow jacket venom        Prior to Admission medications    Medication Sig Start Date End Date Taking? Authorizing Provider   cetirizine (ZYRTEC) 10 MG tablet Take 10 mg by mouth once daily.   Yes Historical Provider, MD   medroxyPROGESTERone (DEPO-PROVERA) 150 mg/mL Syrg INJECT 1 ML INTO THE MUSCLE Q 3 MONTHS 5/25/18  Yes Sabiha Tello NP   methIMAzole (TAPAZOLE) 5 MG Tab TAKE 1 TABLET BY MOUTH TWICE DAILY 4/22/19  Yes Angelita Norwood NP   atenolol (TENORMIN) 25 MG tablet Take 1 tablet (25 mg total) by mouth once daily. 12/24/18 12/24/19  Elkin Christianson MD   valACYclovir (VALTREX) 500 MG tablet TAKE 1 TABLET BY MOUTH TWICE DAILY AS NEEDED FOR OUTBREAK 5/25/18   Sabiha Tello NP       Objective Findings:  Vital Signs:  /86   Pulse 74   Ht 5' 5" (1.651 m)   Wt 59.5 kg (131 lb 2.8 oz)   BMI 21.83 kg/m²   Body mass index is 21.83 kg/m².    Physical " Exam:  General Appearance:  Well appearing in no acute distress, appears stated age  Head:  Normocephalic, atraumatic  Eyes:  No scleral icterus or pallor, EOMI  ENT:  Neck supple, moist mucosa; OP clear  Lungs:  CTA bilaterally in anterior and posterior fields, no wheezes, no crackles; no dullness  Heart:  Regular rate and rhythm, S1, S2 normal, no murmurs heard  Abdomen:  Soft, non tender, non distended with positive bowel sounds in all four quadrants. No hepatosplenomegaly, ascites, or mass  Extremities:  No clubbing, cyanosis, or edema  Skin:  No rash        Labs:  Lab Results   Component Value Date    WBC 5.4 12/21/2018    HGB 12.8 12/21/2018    HCT 38.6 12/21/2018    MCV 88.3 12/21/2018    RDW 12.0 12/21/2018     12/21/2018    GRAN 0.9 (L) 06/15/2018    GRAN 21.3 (L) 06/15/2018    LYMPH 2,840 12/21/2018    LYMPH 52.6 12/21/2018    MONO 572 12/21/2018    MONO 10.6 12/21/2018    EOS 70 12/21/2018    BASO 22 12/21/2018     Lab Results   Component Value Date     01/09/2019    K 4.4 01/09/2019     01/09/2019    CO2 27 01/09/2019    GLU 96 01/09/2019    BUN 13 01/09/2019    CREATININE 0.76 01/09/2019    CALCIUM 9.3 01/09/2019    PROT 7.0 01/09/2019    ALBUMIN 4.2 01/09/2019    BILITOT 0.8 01/09/2019    ALKPHOS 57 01/09/2019    AST 24 01/09/2019    ALT 32 (H) 01/09/2019           Imaging:  U/S abdomen 12/22/18:  3 mm gallbladder polyp.                Assessment:  Lela Garcia is a 28 y.o. female with:  1. Chronic diarrhea    2. Food sensitivity with gastrointestinal symptoms    3. Bloating    4. Gallbladder polyp      She has several problems.  Her diarrhea appears most consistent with IBS, but this is a diagnosis of exclusion.  She has several food intolerances as well - some of these may be related to FODMAPs or carbohydrate intolerance.  Consider SIBO, Celiac disease and H pylori as well.  IBD would be a less likely diagnosis.     Her gallbladder polyp is very small and unlikely to be a  significant cancer risk.         Recommendations/Plan:  1. I will get a Celiac panel and arrange for EGD with me to get biopsies and will consider sending path samples for disaccharidase analysis.    2. She may try IBgard for symptom control  3. I will check stool for inflammation and H pylori infection.    4. Consider breath testing for SIBO and possibly even disaccharidase levels.  5. She will need an abdominal ultrasound 12 months after the last.        Follow-up pending.         Order summary:  Orders Placed This Encounter    Calprotectin    H. pylori antigen, stool    Celiac Disease Panel    Case request GI: EGD (ESOPHAGOGASTRODUODENOSCOPY)         Thank you so much for allowing me to participate in the care of Lela Fowler MD

## 2019-04-29 NOTE — LETTER
May 2, 2019      Elkin Christianson MD  [address]  [address]  [facility] [address]           [facility]  [address]  [facility] [address]  Phone: [phone]  Fax: [phone]          Patient: Lela Garcia   MR Number: [id number]   YOB: 1990   Date of Visit: 4/29/2019       Dear Dr. Elkin Christianson:    Thank you for referring Lela Garcia to me for evaluation. Attached you will find relevant portions of my assessment and plan of care.    If you have questions, please do not hesitate to call me. I look forward to following Lela Garcia along with you.    Sincerely,    Zhane Goss  CC:  No Recipients    If you would like to receive this communication electronically, please contact [email or web address] or [phone] to request more information on [employer] access.    For providers and/or their staff who would like to refer a patient to [facility], please contact us through our one-stop-shop provider referral line, [facility], at [phone].    If you feel you have received this communication in error or would no longer like to receive these types of communications, please e-mail [email or web address]

## 2019-05-27 ENCOUNTER — OFFICE VISIT (OUTPATIENT)
Dept: OBSTETRICS AND GYNECOLOGY | Facility: CLINIC | Age: 29
End: 2019-05-27
Payer: COMMERCIAL

## 2019-05-27 VITALS
DIASTOLIC BLOOD PRESSURE: 74 MMHG | WEIGHT: 131.19 LBS | BODY MASS INDEX: 21.86 KG/M2 | SYSTOLIC BLOOD PRESSURE: 120 MMHG | HEIGHT: 65 IN

## 2019-05-27 DIAGNOSIS — B00.9 HSV INFECTION: ICD-10-CM

## 2019-05-27 DIAGNOSIS — Z01.419 WELL WOMAN EXAM WITH ROUTINE GYNECOLOGICAL EXAM: Primary | ICD-10-CM

## 2019-05-27 DIAGNOSIS — Z78.9 USES DEPO-PROVERA AS PRIMARY BIRTH CONTROL METHOD: ICD-10-CM

## 2019-05-27 DIAGNOSIS — Z12.4 PAP SMEAR FOR CERVICAL CANCER SCREENING: ICD-10-CM

## 2019-05-27 PROCEDURE — 99999 PR PBB SHADOW E&M-EST. PATIENT-LVL III: CPT | Mod: PBBFAC,,, | Performed by: NURSE PRACTITIONER

## 2019-05-27 PROCEDURE — 99395 PREV VISIT EST AGE 18-39: CPT | Mod: S$GLB,,, | Performed by: NURSE PRACTITIONER

## 2019-05-27 PROCEDURE — 88175 CYTOPATH C/V AUTO FLUID REDO: CPT

## 2019-05-27 PROCEDURE — 99999 PR PBB SHADOW E&M-EST. PATIENT-LVL III: ICD-10-PCS | Mod: PBBFAC,,, | Performed by: NURSE PRACTITIONER

## 2019-05-27 PROCEDURE — 99395 PR PREVENTIVE VISIT,EST,18-39: ICD-10-PCS | Mod: S$GLB,,, | Performed by: NURSE PRACTITIONER

## 2019-05-27 RX ORDER — VALACYCLOVIR HYDROCHLORIDE 500 MG/1
TABLET, FILM COATED ORAL
Qty: 30 TABLET | Refills: 1 | Status: SHIPPED | OUTPATIENT
Start: 2019-05-27 | End: 2021-05-05 | Stop reason: SDUPTHER

## 2019-05-27 RX ORDER — MEDROXYPROGESTERONE ACETATE 150 MG/ML
150 INJECTION, SUSPENSION INTRAMUSCULAR
Qty: 1 ML | Refills: 4 | Status: SHIPPED | OUTPATIENT
Start: 2019-05-27 | End: 2020-08-10

## 2019-05-27 RX ORDER — MEDROXYPROGESTERONE ACETATE 150 MG/ML
INJECTION, SUSPENSION INTRAMUSCULAR
Refills: 1 | COMMUNITY
Start: 2019-05-13 | End: 2019-05-27

## 2019-05-27 NOTE — PROGRESS NOTES
CC: Annual  HPI: Pt is a 29 y.o.  female who presents for routine annual exam. She uses depo for contraception. She is happy with it. Last injection was yesterday. She has a friend that is a RN who gives her the injections. She does need a refill today. She does not want STD screening. Dad recently dx with Parkinson's, doing well. She was dx with hyperthyroidism, debating ablation. Currently on medication.     Last pap in 2016 WNL    ROS:  GENERAL: Feeling well overall. Denies fever or chills.   SKIN: Denies rash or lesions.   HEAD: Denies head injury or headache.   NODES: Denies enlarged lymph nodes.   CHEST: Denies chest pain or shortness of breath.   CARDIOVASCULAR: Denies palpitations or left sided chest pain.   ABDOMEN: No abdominal pain, constipation, diarrhea, nausea, vomiting or rectal bleeding.   URINARY: No dysuria, hematuria, or burning on urination.  REPRODUCTIVE: See HPI.   BREASTS: Denies pain, lumps, or nipple discharge.   HEMATOLOGIC: No easy bruisability or excessive bleeding.   MUSCULOSKELETAL: Denies joint pain or swelling.   NEUROLOGIC: Denies syncope or weakness.   PSYCHIATRIC: Denies depression, anxiety or mood swings.    PE:   APPEARANCE: Well nourished, well developed, Black or  female in no acute distress.  NODES: no cervical, supraclavicular, or inguinal lymphadenopathy  BREASTS: Symmetrical, no skin changes or visible lesions. No palpable masses, nipple discharge or adenopathy bilaterally.  ABDOMEN: Soft. No tenderness or masses. No distention. No hernias palpated. No CVA tenderness.  VULVA: No lesions. Normal external female genitalia.  URETHRAL MEATUS: Normal size and location, no lesions, no prolapse.  URETHRA: No masses, tenderness, or prolapse.  VAGINA: Moist. No lesions or lacerations noted. No abnormal discharge present. No odor present.   CERVIX: No lesions or discharge. No cervical motion tenderness.   UTERUS: Normal size, regular shape, mobile,  non-tender.  ADNEXA: No tenderness. No fullness or masses palpated in the adnexal regions.   ANUS PERINEUM: Normal.      Diagnosis:  1. Well woman exam with routine gynecological exam    2. Uses Depo-Provera as primary birth control method    3. Pap smear for cervical cancer screening    4. HSV infection        Plan:     Orders Placed This Encounter    Liquid-based pap smear, screening    valACYclovir (VALTREX) 500 MG tablet    medroxyPROGESTERone (DEPO-PROVERA) 150 mg/mL injection     1. Pap done  2. Valtrex refilled  3. Depo Rx,  -Depo Provera use and potential side effects including altered menstrual bleeding pattern, weight gain, increased fracture risk due to reversible osteoporosis;  -osteoporosis prevention, calcium supplementation, regular weight bearing exercise;  -STDs and prevention, including the HPV vaccine     Patient was counseled today on the new ACS guidelines for cervical cytology screening as well as the current recommendations for breast cancer screening. She was counseled to follow up with her PCP for other routine health maintenance. Counseling session lasted approximately 10 minutes, and all her questions were answered.    Follow-up with me in 1 year for routine exam; pap in 3 years.

## 2019-06-10 ENCOUNTER — PATIENT MESSAGE (OUTPATIENT)
Dept: GASTROENTEROLOGY | Facility: CLINIC | Age: 29
End: 2019-06-10

## 2019-06-13 ENCOUNTER — TELEPHONE (OUTPATIENT)
Dept: GASTROENTEROLOGY | Facility: CLINIC | Age: 29
End: 2019-06-13

## 2019-06-13 NOTE — TELEPHONE ENCOUNTER
----- Message from Franklin Crook sent at 6/13/2019 11:50 AM CDT -----  Contact: pt: [phone]  Needs Advice    Reason for call: pt would like to know if test results available         Communication Preference: pt: [phone]

## 2019-06-14 ENCOUNTER — PATIENT MESSAGE (OUTPATIENT)
Dept: GASTROENTEROLOGY | Facility: CLINIC | Age: 29
End: 2019-06-14

## 2019-06-14 RX ORDER — AMOXICILLIN 500 MG/1
1000 TABLET, FILM COATED ORAL 2 TIMES DAILY
Qty: 56 TABLET | Refills: 0 | Status: SHIPPED | OUTPATIENT
Start: 2019-06-14 | End: 2019-06-28

## 2019-06-14 RX ORDER — OMEPRAZOLE 20 MG/1
20 CAPSULE, DELAYED RELEASE ORAL 2 TIMES DAILY
Qty: 28 CAPSULE | Refills: 0 | Status: SHIPPED | OUTPATIENT
Start: 2019-06-14 | End: 2019-08-14

## 2019-06-14 RX ORDER — CLARITHROMYCIN 500 MG/1
500 TABLET, FILM COATED ORAL 2 TIMES DAILY
Qty: 28 TABLET | Refills: 0 | Status: SHIPPED | OUTPATIENT
Start: 2019-06-14 | End: 2019-06-28

## 2019-06-14 NOTE — TELEPHONE ENCOUNTER
The patient's H pylori stool test was positive.  This needs treatment.  I recommend triple therapy with the following:    Amoxicillin 1000 mg PO bid,  Clarithromycin 500 mg PO bid, and   Omeprazole 20 mg PO bid.    All meds to be taken for 14 days.    Patient needs follow-up for H pylori in GI clinic in 2-3 months.  Can be with ANDRES.    MA to call patient with results and recommendations.

## 2019-06-17 ENCOUNTER — PATIENT MESSAGE (OUTPATIENT)
Dept: GASTROENTEROLOGY | Facility: CLINIC | Age: 29
End: 2019-06-17

## 2019-08-09 RX ORDER — MEDROXYPROGESTERONE ACETATE 150 MG/ML
INJECTION, SUSPENSION INTRAMUSCULAR
Qty: 1 ML | Refills: 0 | Status: SHIPPED | OUTPATIENT
Start: 2019-08-09 | End: 2019-11-05 | Stop reason: SDUPTHER

## 2019-08-13 ENCOUNTER — TELEPHONE (OUTPATIENT)
Dept: GASTROENTEROLOGY | Facility: CLINIC | Age: 29
End: 2019-08-13

## 2019-08-13 NOTE — TELEPHONE ENCOUNTER
MA contacted pt to confirm appointment on 8/14 at 11 am. Pt did not answer , MA left a message for pt to call the clinic back.

## 2019-08-14 ENCOUNTER — OFFICE VISIT (OUTPATIENT)
Dept: GASTROENTEROLOGY | Facility: CLINIC | Age: 29
End: 2019-08-14
Payer: COMMERCIAL

## 2019-08-14 VITALS
DIASTOLIC BLOOD PRESSURE: 72 MMHG | HEART RATE: 64 BPM | SYSTOLIC BLOOD PRESSURE: 104 MMHG | BODY MASS INDEX: 20.51 KG/M2 | WEIGHT: 127.63 LBS | HEIGHT: 66 IN

## 2019-08-14 DIAGNOSIS — R19.5 LOOSE STOOLS: ICD-10-CM

## 2019-08-14 DIAGNOSIS — A04.8 H. PYLORI INFECTION: Primary | ICD-10-CM

## 2019-08-14 PROCEDURE — 99214 PR OFFICE/OUTPT VISIT, EST, LEVL IV, 30-39 MIN: ICD-10-PCS | Mod: S$GLB,,, | Performed by: NURSE PRACTITIONER

## 2019-08-14 PROCEDURE — 3008F BODY MASS INDEX DOCD: CPT | Mod: CPTII,S$GLB,, | Performed by: NURSE PRACTITIONER

## 2019-08-14 PROCEDURE — 3008F PR BODY MASS INDEX (BMI) DOCUMENTED: ICD-10-PCS | Mod: CPTII,S$GLB,, | Performed by: NURSE PRACTITIONER

## 2019-08-14 PROCEDURE — 99999 PR PBB SHADOW E&M-EST. PATIENT-LVL III: ICD-10-PCS | Mod: PBBFAC,,, | Performed by: NURSE PRACTITIONER

## 2019-08-14 PROCEDURE — 99214 OFFICE O/P EST MOD 30 MIN: CPT | Mod: S$GLB,,, | Performed by: NURSE PRACTITIONER

## 2019-08-14 PROCEDURE — 99999 PR PBB SHADOW E&M-EST. PATIENT-LVL III: CPT | Mod: PBBFAC,,, | Performed by: NURSE PRACTITIONER

## 2019-08-14 NOTE — PROGRESS NOTES
[facility] Consultation Note    Reason for Consult:  The encounter diagnosis was H. pylori infection.    PCP:   Elkin Christianson       Referring MD:  No referring provider defined for this encounter.    HPI:  This is a 29 y.o. female here for follow-up for diarrhea, bloating and gallbladder polyp.  She is an established patient last seen by Dr. Fowler April 29, 2019.    Her celiac panel was normal. Calprotectin was normal.  She tested positive for H pylori with [facility].  She was treated with Biaxin triple therapy.  Since completing this regimen pt reports her abdominal cramping, bloating, nausea and vomiting and loose stools have improved.  She does state a week after the abx completion her diarrhea returned.   2 Weeks ago she had two episodes oily stools. But that resolved and now her stools are normal.    Onions give her loose stools.  Patient reports she has a lot of food intolerance is that give her GI symptoms.    ROS:  Constitutional: No fevers, chills, No weight loss  CV: No chest pain  Pulm: No cough, No shortness of breath  GI: see HPI  Derm: No rash      Medical History:  has a past medical history of Seasonal allergies.    Surgical History:  has a past surgical history that includes Pelvic laparoscopy.    Family History: family history includes Breast cancer in her paternal grandmother; Diabetes in her mother and paternal grandmother; Glaucoma in her mother and paternal grandmother; Heart disease in her maternal grandfather and paternal grandmother; Hypertension in her maternal grandmother, mother, and paternal grandmother; Parkinsonism in her father..     Social History:  reports that she has never smoked. She has never used smokeless tobacco. She reports that she drinks alcohol. She reports that she does not use drugs.    Review of patient's allergies indicates:   Allergen Reactions    Iodine and iodide containing products Shortness Of Breath    Shellfish containing  "products Other (See Comments)     Low BP    Allergen ext-venom-honey bee     [place]     Cherries     Cocoa     Egg derived     Flowers     Garlic     Grass pollen-shawna grass standard     Grass pollen-orchardgrass, standard     Grass pollen-red top, standard     Grass pollen-sweet vernal grass, std.     Hornet venom     Milk containing products     Oats (sudhakar)     Orange     Pineapple     Soy     Tree pollen-sharon     Wasp venom     Watermelon     Weed pollen     Wheat containing prod     Yellow jacket venom        Current Outpatient Medications on File Prior to Visit   Medication Sig Dispense Refill    atenolol (TENORMIN) 25 MG tablet Take 1 tablet (25 mg total) by mouth once daily. 90 tablet 1    cetirizine (ZYRTEC) 10 MG tablet Take 10 mg by mouth once daily.      medroxyPROGESTERone (DEPO-PROVERA) 150 mg/mL injection Inject 1 mL (150 mg total) into the muscle every 3 (three) months. for 4 doses 1 mL 4    medroxyPROGESTERone (DEPO-PROVERA) 150 mg/mL injection ADMINISTER 1 ML IN THE MUSCLE EVERY 3 MONTHS 1 mL 0    methIMAzole (TAPAZOLE) 5 MG Tab TAKE 1 TABLET BY MOUTH TWICE DAILY 180 tablet 0    valACYclovir (VALTREX) 500 MG tablet TAKE 1 TABLET BY MOUTH TWICE DAILY AS NEEDED FOR OUTBREAK 30 tablet 1    [DISCONTINUED] omeprazole (PRILOSEC) 20 MG capsule Take 1 capsule (20 mg total) by mouth 2 (two) times daily. for 14 days 28 capsule 0     No current facility-administered medications on file prior to visit.          Objective Findings:    Vital Signs:  /72 (BP Location: Left arm)   Pulse 64   Ht 5' 6" (1.676 m)   Wt 57.9 kg (127 lb 10.3 oz)   BMI 20.60 kg/m²   Body mass index is 20.6 kg/m².    Physical Exam:  General Appearance: Well appearing in no acute distress  Head:   Normocephalic, without obvious abnormality  Eyes:    No scleral icterus  ENT: Neck supple  Extremities: No edema  Skin: No rash  Neurologic: AAO x 3      Labs:  Lab Results   Component Value Date    WBC " 5.4 12/21/2018    HGB 12.8 12/21/2018    HCT 38.6 12/21/2018     12/21/2018    CRP 0.7 12/14/2018    CHOL 108 12/14/2018    TRIG 40 12/14/2018    HDL 51 12/14/2018    ALT 32 (H) 01/09/2019    AST 24 01/09/2019     01/09/2019    K 4.4 01/09/2019     01/09/2019    CREATININE 0.76 01/09/2019    BUN 13 01/09/2019    CO2 27 01/09/2019    TSH 0.54 01/09/2019    HGBA1C 5.3 12/21/2018       Imaging:  None reviewed  Endoscopy:    None reviewed    Assessment:    Ms. Garcia is a 29 y.o. WF with:    1. H. pylori infection       Her symptoms have greatly improved since being treated with the Biaxin triple therapy.  Stool kit provided today and patient will turn in her stool to [facility] as her insurance will pay for her labs performed by [facility].  Patient will let me know the results that test.  Her loose stools have improved.  She does notice certain foods cause her loose stools.  She had 2 episodes of oily stools a couple weeks ago but that has since resolved.  I explained to patient to please let me know if those return or and become more frequent.    Recommendations:  1.  Turn in stool to rule out H pylori  2.  Continue avoiding foods that cause loose stools.  3.  Try IBgard for any GI discomfort    Follow up if symptoms worsen or fail to improve.      Order summary:  Orders Placed This Encounter    H. pylori antigen, stool         Thank you so much for allowing me to participate in the care of Lela Garcia    YESSENIA Romano

## 2019-08-14 NOTE — PATIENT INSTRUCTIONS
Please let me know if the oily stools return more consecutively.     IBgard for symptom control of diarrhea and cramping.

## 2019-09-03 RX ORDER — METHIMAZOLE 5 MG/1
TABLET ORAL
Qty: 180 TABLET | Refills: 0 | Status: SHIPPED | OUTPATIENT
Start: 2019-09-03 | End: 2019-11-27 | Stop reason: SDUPTHER

## 2019-10-10 ENCOUNTER — PATIENT MESSAGE (OUTPATIENT)
Dept: ENDOCRINOLOGY | Facility: CLINIC | Age: 29
End: 2019-10-10

## 2019-10-18 ENCOUNTER — PATIENT MESSAGE (OUTPATIENT)
Dept: ENDOCRINOLOGY | Facility: CLINIC | Age: 29
End: 2019-10-18

## 2019-10-18 ENCOUNTER — PATIENT MESSAGE (OUTPATIENT)
Dept: GASTROENTEROLOGY | Facility: CLINIC | Age: 29
End: 2019-10-18

## 2019-10-31 RX ORDER — MEDROXYPROGESTERONE ACETATE 150 MG/ML
INJECTION, SUSPENSION INTRAMUSCULAR
Qty: 1 ML | Refills: 0 | Status: SHIPPED | OUTPATIENT
Start: 2019-10-31 | End: 2019-11-05 | Stop reason: SDUPTHER

## 2019-10-31 NOTE — PROGRESS NOTES
Subjective:      Patient ID: Lela Garcia is a 29 y.o. female.    Chief Complaint:  Hyperthyroidism    History of Present Illness  Lela Garcia presents today for follow up for subclinical hyperthyroldism. Last visit in Jan 2019.     She was found to have subclinical hyperthyroidism on labs done by pcp     She presented with fatigue, nausea, low energy, muscle fatigue, cold & heat intolerance, sore muscles, occ diarrhea, itchy skin, rash on abdomen (since starting methimazole this resolved), sob, anxiety, tremor.     current symptoms  - palpations  - weight loss  - frequent bm  - Hair loss  - Brittle nails  - skin changes  - tremor   - anxiety    Current medication: Methimazole 5 mg BID & atenolol 50 mg daily (started on 12/26/2018). We increased her atenolol in Oct 2019 with increase in palpitations. Reports she had issues with nausea in the past but more recently, she reports eye pain with nausea-has appt with eye doctor coming up. She spoke with her GI and will follow up if needed.     Denies Biotin usage      Any recent in the 6-12 months iodine or contrast? No   Thyroid tenderness?  No     Graves Eye Clinical Activity: 3/7=Active  Eye pain? yes  Eye pain with movement? eyes  Eyelid erythema? no   Erythema of conjunctiva? No     Caruncle inflammation? no  Eyelid swelling? no     Ref. Range 6/15/2018 15:40 12/14/2018 11:27 12/19/2018 09:08 12/21/2018 16:28 1/7/2019 09:05 1/9/2019 07:51   TSH Latest Units: mIU/L 0.727 <0.01 (L) 0.01 (L)   0.54   T3, Free Latest Ref Range: 2.3 - 4.2 pg/mL   4.3 (H)      T4, Free Latest Ref Range: 0.8 - 1.8 ng/dL   1.4   1.2   Thyrotropin Receptor Ab Latest Ref Range: 0.00 - 1.75 IU/L     <1.00    Thyroglobulin Ab Screen Latest Ref Range: < or = 1 IU/mL   <1        Uptake and scan 2/2019  FINDINGS:  The 24 hour uptake is elevated at 69 %.  Right and left thyroid lobes are normal in size, shape, and location with homogenous distribution of the radionuclide throughout  the lobes.  No focal abnormal uptake.  Uptake within the normal thyroid tissue is of greater intensity to that of the salivary glands suggesting diffuse thyroiditis.      Impression     Homogeneous increased radiotracer uptake throughout the thyroid with an elevated 24 hr uptake value compatible with hyperthyroidism, most notably Graves disease.     Patient also had a positive CHRYSTAL- saw rheumatology-workup negative    Review of Systems   Constitutional: Positive for fatigue.   Eyes: Positive for visual disturbance.   Respiratory: Negative for shortness of breath.    Cardiovascular: Negative for chest pain.   Gastrointestinal: Negative for abdominal pain.   Musculoskeletal: Negative for arthralgias.   Skin: Negative for wound.   Neurological: Negative for headaches.   Hematological: Does not bruise/bleed easily.   Psychiatric/Behavioral: Negative for sleep disturbance.     Objective:   Physical Exam   Constitutional: She appears well-developed.   Neck: No thyromegaly present.   Cardiovascular: Normal rate.   Pulmonary/Chest: Effort normal.   Abdominal: Soft.   Vitals reviewed.    Body mass index is 20.99 kg/m².    Lab Review:   Lab Results   Component Value Date    HGBA1C 5.3 12/21/2018     Lab Results   Component Value Date    CHOL 108 12/14/2018    HDL 51 12/14/2018    LDLCALC 46 12/14/2018    TRIG 40 12/14/2018    CHOLHDL 2.1 12/14/2018     Lab Results   Component Value Date     01/09/2019    K 4.4 01/09/2019     01/09/2019    CO2 27 01/09/2019    GLU 96 01/09/2019    BUN 13 01/09/2019    CREATININE 0.76 01/09/2019    CALCIUM 9.3 01/09/2019    PROT 7.0 01/09/2019    ALBUMIN 4.2 01/09/2019    BILITOT 0.8 01/09/2019    ALKPHOS 57 01/09/2019    AST 24 01/09/2019    ALT 32 (H) 01/09/2019    ANIONGAP 9 06/15/2018    ESTGFRAFRICA 124 01/09/2019    EGFRNONAA 107 01/09/2019    TSH 0.54 01/09/2019     Assessment and Plan     1. Subclinical hyperthyroidism  T4, free    TSH    Comprehensive metabolic panel    CBC  auto differential    T4, free    TSH    Comprehensive metabolic panel    CBC auto differential    Thyrotropin receptor antibody   2. Fatigue, unspecified type       Subclinical hyperthyroidism  Biochemical evidence of subclinical hyperthyroidism.  Patient is currently symptomatic.     Risks and benefits discussed with patient.  Increased risk of mortality and/or conversion to overt hyperthyroidism when:  TSH <0.1, heart disease (heart failure, CMPY), arrhythmia (ie afib), bone disease, and underlying nodular thyroid disease.    -Continue methimazole 5 mg BID & atenolol 50 mg daily   -Check Labs today. Considering her eye symptoms, will recheck TRAB  -Discussed possible treatment options - observation, Methimazole, ESCALONA.   If we observe, patient is at risk for spontaneous resolution, stable disease, or progression to overt hyperthyroidism.  Patient should proceed with caution if he/she needs iodine load for imaging or requires iodine containing medication (ie. Amiodarone).  Medication side effects discussed. ESCALONA is associated with resolution of disease and/or progression to hypothyroidism.          Fatigue  -Check labs today      Follow up in about 6 months (around 5/1/2020).  Labs today and on RTC

## 2019-11-01 ENCOUNTER — OFFICE VISIT (OUTPATIENT)
Dept: ENDOCRINOLOGY | Facility: CLINIC | Age: 29
End: 2019-11-01
Payer: COMMERCIAL

## 2019-11-01 VITALS
SYSTOLIC BLOOD PRESSURE: 114 MMHG | HEART RATE: 78 BPM | BODY MASS INDEX: 20.9 KG/M2 | HEIGHT: 66 IN | DIASTOLIC BLOOD PRESSURE: 80 MMHG | WEIGHT: 130.06 LBS

## 2019-11-01 DIAGNOSIS — R53.83 FATIGUE, UNSPECIFIED TYPE: ICD-10-CM

## 2019-11-01 DIAGNOSIS — E05.90 SUBCLINICAL HYPERTHYROIDISM: Primary | ICD-10-CM

## 2019-11-01 PROCEDURE — 3008F BODY MASS INDEX DOCD: CPT | Mod: CPTII,S$GLB,, | Performed by: NURSE PRACTITIONER

## 2019-11-01 PROCEDURE — 3008F PR BODY MASS INDEX (BMI) DOCUMENTED: ICD-10-PCS | Mod: CPTII,S$GLB,, | Performed by: NURSE PRACTITIONER

## 2019-11-01 PROCEDURE — 99999 PR PBB SHADOW E&M-EST. PATIENT-LVL III: ICD-10-PCS | Mod: PBBFAC,,, | Performed by: NURSE PRACTITIONER

## 2019-11-01 PROCEDURE — 99999 PR PBB SHADOW E&M-EST. PATIENT-LVL III: CPT | Mod: PBBFAC,,, | Performed by: NURSE PRACTITIONER

## 2019-11-01 PROCEDURE — 99214 OFFICE O/P EST MOD 30 MIN: CPT | Mod: S$GLB,,, | Performed by: NURSE PRACTITIONER

## 2019-11-01 PROCEDURE — 99214 PR OFFICE/OUTPT VISIT, EST, LEVL IV, 30-39 MIN: ICD-10-PCS | Mod: S$GLB,,, | Performed by: NURSE PRACTITIONER

## 2019-11-01 NOTE — ASSESSMENT & PLAN NOTE
Biochemical evidence of subclinical hyperthyroidism.  Patient is currently symptomatic.     Risks and benefits discussed with patient.  Increased risk of mortality and/or conversion to overt hyperthyroidism when:  TSH <0.1, heart disease (heart failure, CMPY), arrhythmia (ie afib), bone disease, and underlying nodular thyroid disease.    -Continue methimazole 5 mg BID & atenolol 50 mg daily   -Check Labs today. Considering her eye symptoms, will recheck TRAB  -Discussed possible treatment options - observation, Methimazole, ESCALONA.   If we observe, patient is at risk for spontaneous resolution, stable disease, or progression to overt hyperthyroidism.  Patient should proceed with caution if he/she needs iodine load for imaging or requires iodine containing medication (ie. Amiodarone).  Medication side effects discussed. ESCALONA is associated with resolution of disease and/or progression to hypothyroidism.

## 2019-11-02 ENCOUNTER — PATIENT MESSAGE (OUTPATIENT)
Dept: OBSTETRICS AND GYNECOLOGY | Facility: CLINIC | Age: 29
End: 2019-11-02

## 2019-11-02 LAB
ALBUMIN SERPL-MCNC: 4.3 G/DL (ref 3.6–5.1)
ALBUMIN/GLOB SERPL: 1.5 (CALC) (ref 1–2.5)
ALP SERPL-CCNC: 56 U/L (ref 33–115)
ALT SERPL-CCNC: 14 U/L (ref 6–29)
AST SERPL-CCNC: 17 U/L (ref 10–30)
BASOPHILS # BLD AUTO: 19 CELLS/UL (ref 0–200)
BASOPHILS NFR BLD AUTO: 0.4 %
BILIRUB SERPL-MCNC: 0.8 MG/DL (ref 0.2–1.2)
BUN SERPL-MCNC: 14 MG/DL (ref 7–25)
BUN/CREAT SERPL: NORMAL (CALC) (ref 6–22)
CALCIUM SERPL-MCNC: 9.4 MG/DL (ref 8.6–10.2)
CHLORIDE SERPL-SCNC: 107 MMOL/L (ref 98–110)
CO2 SERPL-SCNC: 26 MMOL/L (ref 20–32)
CREAT SERPL-MCNC: 0.86 MG/DL (ref 0.5–1.1)
EOSINOPHIL # BLD AUTO: 52 CELLS/UL (ref 15–500)
EOSINOPHIL NFR BLD AUTO: 1.1 %
ERYTHROCYTE [DISTWIDTH] IN BLOOD BY AUTOMATED COUNT: 12.4 % (ref 11–15)
GFRSERPLBLD MDRD-ARVRAT: 91 ML/MIN/1.73M2
GLOBULIN SER CALC-MCNC: 2.9 G/DL (CALC) (ref 1.9–3.7)
GLUCOSE SERPL-MCNC: 86 MG/DL (ref 65–99)
HCT VFR BLD AUTO: 37.9 % (ref 35–45)
HGB BLD-MCNC: 12.6 G/DL (ref 11.7–15.5)
LYMPHOCYTES # BLD AUTO: 2891 CELLS/UL (ref 850–3900)
LYMPHOCYTES NFR BLD AUTO: 61.5 %
MCH RBC QN AUTO: 30.8 PG (ref 27–33)
MCHC RBC AUTO-ENTMCNC: 33.2 G/DL (ref 32–36)
MCV RBC AUTO: 92.7 FL (ref 80–100)
MONOCYTES # BLD AUTO: 395 CELLS/UL (ref 200–950)
MONOCYTES NFR BLD AUTO: 8.4 %
NEUTROPHILS # BLD AUTO: 1344 CELLS/UL (ref 1500–7800)
NEUTROPHILS NFR BLD AUTO: 28.6 %
PLATELET # BLD AUTO: 267 THOUSAND/UL (ref 140–400)
PMV BLD REES-ECKER: 12.3 FL (ref 7.5–12.5)
POTASSIUM SERPL-SCNC: 4.7 MMOL/L (ref 3.5–5.3)
PROT SERPL-MCNC: 7.2 G/DL (ref 6.1–8.1)
RBC # BLD AUTO: 4.09 MILLION/UL (ref 3.8–5.1)
SODIUM SERPL-SCNC: 140 MMOL/L (ref 135–146)
T4 FREE SERPL-MCNC: 0.8 NG/DL (ref 0.8–1.8)
TSH SERPL-ACNC: 12.05 MIU/L
WBC # BLD AUTO: 4.7 THOUSAND/UL (ref 3.8–10.8)

## 2019-11-04 ENCOUNTER — PATIENT MESSAGE (OUTPATIENT)
Dept: ENDOCRINOLOGY | Facility: CLINIC | Age: 29
End: 2019-11-04

## 2019-11-04 DIAGNOSIS — E05.90 SUBCLINICAL HYPERTHYROIDISM: Primary | ICD-10-CM

## 2019-11-04 NOTE — TELEPHONE ENCOUNTER
Good morning,     I received some of your lab results. Your complete blood count lab was normal (normal white blood cells, red blood cells, and you are not anemic). Your sugar level, electrolytes, kidney and liver function were all normal. Your thyroid level was abnormal, meaning you are actually on too much methimazole. Decrease methimazole from 5 mg twice daily to 5 mg daily. We will recheck your thyroid levels again in 4 weeks. I will send the order. Let me know if you have any questions.    Thanks,   Sri Bernal, DNP, APRN

## 2019-11-05 ENCOUNTER — PATIENT MESSAGE (OUTPATIENT)
Dept: OBSTETRICS AND GYNECOLOGY | Facility: CLINIC | Age: 29
End: 2019-11-05

## 2019-11-27 RX ORDER — METHIMAZOLE 5 MG/1
TABLET ORAL
Qty: 180 TABLET | Refills: 0 | Status: SHIPPED | OUTPATIENT
Start: 2019-11-27 | End: 2020-02-13

## 2019-11-27 RX ORDER — ATENOLOL 25 MG/1
25 TABLET ORAL DAILY
Qty: 90 TABLET | Refills: 1 | Status: SHIPPED | OUTPATIENT
Start: 2019-11-27 | End: 2020-04-28

## 2020-02-13 RX ORDER — METHIMAZOLE 5 MG/1
5 TABLET ORAL DAILY
Qty: 30 TABLET | Refills: 2 | Status: SHIPPED | OUTPATIENT
Start: 2020-02-13 | End: 2020-03-02

## 2020-03-02 RX ORDER — METHIMAZOLE 5 MG/1
5 TABLET ORAL DAILY
Qty: 30 TABLET | Refills: 3 | Status: SHIPPED | OUTPATIENT
Start: 2020-03-02 | End: 2020-04-27

## 2020-04-27 RX ORDER — METHIMAZOLE 5 MG/1
TABLET ORAL
Qty: 60 TABLET | Refills: 0 | Status: SHIPPED | OUTPATIENT
Start: 2020-04-27 | End: 2020-11-03

## 2020-04-27 NOTE — TELEPHONE ENCOUNTER
Last seen by Ms. Bernal  11/2019  Graves on mmi 5 mg twice daily  Last level checked 11/19  Needs repeat labs (11/1 and 11/4) please schedule these two labs

## 2020-04-28 RX ORDER — ATENOLOL 25 MG/1
TABLET ORAL
Qty: 90 TABLET | Refills: 1 | Status: SHIPPED | OUTPATIENT
Start: 2020-04-28 | End: 2020-11-03

## 2020-05-01 ENCOUNTER — PATIENT MESSAGE (OUTPATIENT)
Dept: ENDOCRINOLOGY | Facility: CLINIC | Age: 30
End: 2020-05-01

## 2020-05-21 NOTE — PROGRESS NOTES
Subjective:      Patient ID: Lela Garcia is a 30 y.o. female.    Chief Complaint:  No chief complaint on file.      History of Present Illness  Lela Garcia presents today for follow up of subclinical hyperthyroidism.   This is a MyChart video visit.    The patient location is: home  The chief complaint leading to consultation is: hyperthyroidism   Visit type: Virtual visit with synchronous audio and video  Total time spent with patient: 10 minutes   Each patient to whom he or she provides medical services by telemedicine is:  (1) informed of the relationship between the physician and patient and the respective role of any other health care provider with respect to management of the patient; and (2) notified that he or she may decline to receive medical services by telemedicine and may withdraw from such care at any time.    She was found to have subclinical hyperthyroidism on labs done by pcp      She presented with fatigue, nausea, low energy, muscle fatigue, cold & heat intolerance, sore muscles, occ diarrhea, itchy skin, rash on abdomen (since starting methimazole this resolved), sob, anxiety, tremor.     Current medication: Methimazole 5 mg BID & atenolol 25 mg daily (started on 12/26/2018).      current symptoms:  - palpations  + weight gain recently   - frequent bm  - Hair loss  - Brittle nails  - skin changes  - tremor   - anxiety  + fatigue     Denies Biotin usage       Any recent in the 6-12 months iodine or contrast? No   Thyroid tenderness?  No      Graves Eye Clinical Activity: 3/7=Active  Eye pain? no  Eye pain with movement? no  Eyelid erythema? no   Erythema of conjunctiva? No  Caruncle inflammation? no  Eyelid swelling? No    She was experiencing eye symptoms that have subsided since our last visit. TRAB was checked twice and were negative both times.        Ref. Range 6/15/2018 15:40 12/14/2018 11:27 12/19/2018 09:08 12/21/2018 16:28 1/7/2019 09:05 1/9/2019 07:51   TSH Latest  Units: mIU/L 0.727 <0.01 (L) 0.01 (L)     0.54   T3, Free Latest Ref Range: 2.3 - 4.2 pg/mL     4.3 (H)         T4, Free Latest Ref Range: 0.8 - 1.8 ng/dL     1.4     1.2   Thyrotropin Receptor Ab Latest Ref Range: 0.00 - 1.75 IU/L         <1.00     Thyroglobulin Ab Screen Latest Ref Range: < or = 1 IU/mL     <1            Uptake and scan 2/2019       FINDINGS:  The 24 hour uptake is elevated at 69 %.  Right and left thyroid lobes are normal in size, shape, and location with homogenous distribution of the radionuclide throughout the lobes.  No focal abnormal uptake.  Uptake within the normal thyroid tissue is of greater intensity to that of the salivary glands suggesting diffuse thyroiditis.       Impression       Homogeneous increased radiotracer uptake throughout the thyroid with an elevated 24 hr uptake value compatible with hyperthyroidism, most notably Graves disease.      Patient also had a positive CHRYSTAL- saw rheumatology-workup negative    She started taking a MVI     Review of Systems   Constitutional: Positive for fatigue and unexpected weight change (gain).   Eyes: Negative for visual disturbance.   Respiratory: Negative for shortness of breath.    Cardiovascular: Negative for chest pain.   Gastrointestinal: Negative for abdominal pain.   Musculoskeletal: Negative for arthralgias.   Skin: Negative for wound.   Neurological: Negative for headaches.   Hematological: Does not bruise/bleed easily.   Psychiatric/Behavioral: Negative for sleep disturbance.       Lab Review:   Lab Results   Component Value Date    HGBA1C 5.3 12/21/2018    HGBA1C 5.3 06/15/2018      Lab Results   Component Value Date    CHOL 108 12/14/2018    HDL 51 12/14/2018    LDLCALC 46 12/14/2018    TRIG 40 12/14/2018    CHOLHDL 2.1 12/14/2018     Lab Results   Component Value Date     11/01/2019    K 4.7 11/01/2019     11/01/2019    CO2 26 11/01/2019    GLU 86 11/01/2019    BUN 14 11/01/2019    CREATININE 0.86 11/01/2019     CALCIUM 9.4 11/01/2019    PROT 7.2 11/01/2019    ALBUMIN 4.3 11/01/2019    BILITOT 0.8 11/01/2019    ALKPHOS 56 11/01/2019    AST 17 11/01/2019    ALT 14 11/01/2019    ANIONGAP 9 06/15/2018    ESTGFRAFRICA 106 11/01/2019    EGFRNONAA 91 11/01/2019    TSH 12.05 (H) 11/01/2019     No results found for: [id number]  Assessment and Plan     1. Subclinical hyperthyroidism  Comprehensive metabolic panel    T4, free    TSH    Hemoglobin A1C    Vitamin B12    Vitamin D    Thyroid stimulating immunoglobulin    Comprehensive metabolic panel    T4, free    TSH    Hemoglobin A1C    Vitamin B12    Vitamin D    Thyroid stimulating immunoglobulin    CANCELED: TSH    CANCELED: T4, free    CANCELED: Comprehensive metabolic panel    CANCELED: Thyroid stimulating immunoglobulin    CANCELED: Vitamin B12    CANCELED: Hemoglobin A1C    CANCELED: Vitamin D   2. Fatigue, unspecified type  Comprehensive metabolic panel    T4, free    TSH    Hemoglobin A1C    Vitamin B12    Vitamin D    Thyroid stimulating immunoglobulin    Comprehensive metabolic panel    T4, free    TSH    Hemoglobin A1C    Vitamin B12    Vitamin D    Thyroid stimulating immunoglobulin    CANCELED: Thyroid stimulating immunoglobulin    CANCELED: Vitamin B12    CANCELED: Hemoglobin A1C    CANCELED: Vitamin D     Subclinical hyperthyroidism  Biochemical evidence of subclinical hyperthyroidism.  Patient is currently asymptomatic. Based on recent weight gain pt could possibly be in remission. Check labs today     Risks and benefits discussed with patient.  Increased risk of mortality and/or conversion to overt hyperthyroidism when:  TSH <0.1, heart disease (heart failure, CMPY), arrhythmia (ie afib), bone disease, and underlying nodular thyroid disease.    -Continue methimazole 5 mg BID & atenolol 25 mg daily   - eye symptoms have resolved  -Discussed possible treatment options - observation, Methimazole, ESCALONA.   If we observe, patient is at risk for spontaneous resolution,  stable disease, or progression to overt hyperthyroidism.  Patient should proceed with caution if he/she needs iodine load for imaging or requires iodine containing medication (ie. Amiodarone).  Medication side effects discussed. ESCALONA is associated with resolution of disease and/or progression to hypothyroidism.          Fatigue  -Check labs today      Follow up in about 1 year (around 5/26/2021).    I spent 10 minutes face-to-face with the patient, over half of the visit was spent on counseling and/or coordinating the care of the patient.    Counseling includes:  Diagnostic results, impressions, recommendations   Prognosis   Risk and benefits of management/treatment options   Instructions for management treatment and or follow-up   Importance of compliance with management   Risk factor reduction   Patient education

## 2020-05-25 ENCOUNTER — TELEPHONE (OUTPATIENT)
Dept: ENDOCRINOLOGY | Facility: CLINIC | Age: 30
End: 2020-05-25

## 2020-05-25 NOTE — TELEPHONE ENCOUNTER
Called pt and left VM for tomorrow apt..  Contact no was provided if pt have any question or concern .

## 2020-05-26 ENCOUNTER — OFFICE VISIT (OUTPATIENT)
Dept: ENDOCRINOLOGY | Facility: CLINIC | Age: 30
End: 2020-05-26
Payer: COMMERCIAL

## 2020-05-26 DIAGNOSIS — E05.90 SUBCLINICAL HYPERTHYROIDISM: Primary | ICD-10-CM

## 2020-05-26 DIAGNOSIS — R53.83 FATIGUE, UNSPECIFIED TYPE: ICD-10-CM

## 2020-05-26 PROCEDURE — 99214 PR OFFICE/OUTPT VISIT, EST, LEVL IV, 30-39 MIN: ICD-10-PCS | Mod: 95,,, | Performed by: NURSE PRACTITIONER

## 2020-05-26 PROCEDURE — 99214 OFFICE O/P EST MOD 30 MIN: CPT | Mod: 95,,, | Performed by: NURSE PRACTITIONER

## 2020-05-26 NOTE — ASSESSMENT & PLAN NOTE
Biochemical evidence of subclinical hyperthyroidism.  Patient is currently asymptomatic. Based on recent weight gain pt could possibly be in remission. Check labs today     Risks and benefits discussed with patient.  Increased risk of mortality and/or conversion to overt hyperthyroidism when:  TSH <0.1, heart disease (heart failure, CMPY), arrhythmia (ie afib), bone disease, and underlying nodular thyroid disease.    -Continue methimazole 5 mg BID & atenolol 25 mg daily   - eye symptoms have resolved  -Discussed possible treatment options - observation, Methimazole, ESCALONA.   If we observe, patient is at risk for spontaneous resolution, stable disease, or progression to overt hyperthyroidism.  Patient should proceed with caution if he/she needs iodine load for imaging or requires iodine containing medication (ie. Amiodarone).  Medication side effects discussed. ESCALONA is associated with resolution of disease and/or progression to hypothyroidism.

## 2020-05-28 ENCOUNTER — PATIENT MESSAGE (OUTPATIENT)
Dept: ENDOCRINOLOGY | Facility: CLINIC | Age: 30
End: 2020-05-28

## 2020-06-01 LAB
25(OH)D3 SERPL-MCNC: 27 NG/ML (ref 30–100)
ALBUMIN SERPL-MCNC: 4.4 G/DL (ref 3.6–5.1)
ALBUMIN/GLOB SERPL: 1.4 (CALC) (ref 1–2.5)
ALP SERPL-CCNC: 68 U/L (ref 31–125)
ALT SERPL-CCNC: 21 U/L (ref 6–29)
AST SERPL-CCNC: 20 U/L (ref 10–30)
BILIRUB SERPL-MCNC: 0.7 MG/DL (ref 0.2–1.2)
BUN SERPL-MCNC: 6 MG/DL (ref 7–25)
BUN/CREAT SERPL: 7 (CALC) (ref 6–22)
CALCIUM SERPL-MCNC: 9.6 MG/DL (ref 8.6–10.2)
CHLORIDE SERPL-SCNC: 107 MMOL/L (ref 98–110)
CO2 SERPL-SCNC: 26 MMOL/L (ref 20–32)
CREAT SERPL-MCNC: 0.85 MG/DL (ref 0.5–1.1)
GFRSERPLBLD MDRD-ARVRAT: 92 ML/MIN/1.73M2
GLOBULIN SER CALC-MCNC: 3.1 G/DL (CALC) (ref 1.9–3.7)
GLUCOSE SERPL-MCNC: 88 MG/DL (ref 65–99)
HBA1C MFR BLD: 5.2 % OF TOTAL HGB
POTASSIUM SERPL-SCNC: 3.6 MMOL/L (ref 3.5–5.3)
PROT SERPL-MCNC: 7.5 G/DL (ref 6.1–8.1)
SODIUM SERPL-SCNC: 140 MMOL/L (ref 135–146)
T4 FREE SERPL-MCNC: 0.6 NG/DL (ref 0.8–1.8)
TSH SERPL-ACNC: 27.54 MIU/L
TSI SER-ACNC: <89 % BASELINE
VIT B12 SERPL-MCNC: 598 PG/ML (ref 200–1100)

## 2020-06-04 ENCOUNTER — PATIENT MESSAGE (OUTPATIENT)
Dept: ENDOCRINOLOGY | Facility: CLINIC | Age: 30
End: 2020-06-04

## 2020-06-04 DIAGNOSIS — E05.90 SUBCLINICAL HYPERTHYROIDISM: Primary | ICD-10-CM

## 2020-06-14 LAB — T4 FREE SERPL-MCNC: 0.8 NG/DL (ref 0.8–1.8)

## 2020-06-18 LAB
TSI SER-ACNC: <89 % BASELINE
VIT B12 SERPL-MCNC: 360 PG/ML (ref 200–1100)

## 2020-06-19 ENCOUNTER — PATIENT MESSAGE (OUTPATIENT)
Dept: ENDOCRINOLOGY | Facility: CLINIC | Age: 30
End: 2020-06-19

## 2020-06-19 DIAGNOSIS — E05.90 SUBCLINICAL HYPERTHYROIDISM: Primary | ICD-10-CM

## 2020-06-28 LAB
T4 FREE SERPL-MCNC: 1 NG/DL (ref 0.8–1.8)
TSH SERPL-ACNC: 2.52 MIU/L

## 2020-06-29 ENCOUNTER — PATIENT MESSAGE (OUTPATIENT)
Dept: ENDOCRINOLOGY | Facility: CLINIC | Age: 30
End: 2020-06-29

## 2020-06-29 DIAGNOSIS — E05.90 SUBCLINICAL HYPERTHYROIDISM: Primary | ICD-10-CM

## 2020-08-08 LAB
T4 FREE SERPL-MCNC: 1.1 NG/DL (ref 0.8–1.8)
TSH SERPL-ACNC: 2.35 MIU/L

## 2020-11-03 ENCOUNTER — OFFICE VISIT (OUTPATIENT)
Dept: OBSTETRICS AND GYNECOLOGY | Facility: CLINIC | Age: 30
End: 2020-11-03
Payer: COMMERCIAL

## 2020-11-03 VITALS
SYSTOLIC BLOOD PRESSURE: 118 MMHG | BODY MASS INDEX: 21.08 KG/M2 | HEIGHT: 66 IN | DIASTOLIC BLOOD PRESSURE: 72 MMHG | WEIGHT: 131.19 LBS

## 2020-11-03 DIAGNOSIS — Z01.419 WELL WOMAN EXAM WITH ROUTINE GYNECOLOGICAL EXAM: Primary | ICD-10-CM

## 2020-11-03 DIAGNOSIS — Z78.9 USES DEPO-PROVERA AS PRIMARY BIRTH CONTROL METHOD: ICD-10-CM

## 2020-11-03 LAB
CANDIDA RRNA VAG QL PROBE: NEGATIVE
G VAGINALIS RRNA GENITAL QL PROBE: NEGATIVE
T VAGINALIS RRNA GENITAL QL PROBE: NEGATIVE

## 2020-11-03 PROCEDURE — 99999 PR PBB SHADOW E&M-EST. PATIENT-LVL III: ICD-10-PCS | Mod: PBBFAC,,, | Performed by: NURSE PRACTITIONER

## 2020-11-03 PROCEDURE — 99395 PR PREVENTIVE VISIT,EST,18-39: ICD-10-PCS | Mod: S$GLB,,, | Performed by: NURSE PRACTITIONER

## 2020-11-03 PROCEDURE — 87480 CANDIDA DNA DIR PROBE: CPT

## 2020-11-03 PROCEDURE — 99999 PR PBB SHADOW E&M-EST. PATIENT-LVL III: CPT | Mod: PBBFAC,,, | Performed by: NURSE PRACTITIONER

## 2020-11-03 PROCEDURE — 99395 PREV VISIT EST AGE 18-39: CPT | Mod: S$GLB,,, | Performed by: NURSE PRACTITIONER

## 2020-11-03 PROCEDURE — 87510 GARDNER VAG DNA DIR PROBE: CPT

## 2020-11-03 RX ORDER — MEDROXYPROGESTERONE ACETATE 150 MG/ML
INJECTION, SUSPENSION INTRAMUSCULAR
Qty: 1 ML | Refills: 3 | Status: SHIPPED | OUTPATIENT
Start: 2020-11-03 | End: 2021-10-07 | Stop reason: SDUPTHER

## 2020-11-03 NOTE — PROGRESS NOTES
CC: Annual  HPI: Pt is a 30 y.o.  female who presents for routine annual exam. She uses depo for contraception. She is happy with it and desires a refill today. She has been on it since 2016 and is aware of the potential for temporary bone loss. She has done an IUD in the past but had to have a lap surgery for removal due to migration of the device. She is good with exercising and does take a multivitamin with calcium/Vitamin D. She does not want STD screening. Denies vaginal itching or odor today but is requesting a vaginosis screen for reassurance.     FH:   Breast cancer: none  Colon cancer: none  Ovarian cancer: none  Uterine cancer: none    Flu vaccine: yes  HPV vaccine: yes  Last pap smear: 2019 NILM  Colonoscopy: na  DEXA: na  Mammogram: na  STD history: HSV  Birth control: depo  OB history:   Tobacco use: no    ROS:  GENERAL: Feeling well overall. Denies fever or chills.   SKIN: Denies rash or lesions.   HEAD: Denies head injury or headache.   NODES: Denies enlarged lymph nodes.   CHEST: Denies chest pain or shortness of breath.   CARDIOVASCULAR: Denies palpitations or left sided chest pain.   ABDOMEN: No abdominal pain, constipation, diarrhea, nausea, vomiting or rectal bleeding.   URINARY: No dysuria, hematuria, or burning on urination.  REPRODUCTIVE: See HPI.   BREASTS: Denies pain, lumps, or nipple discharge.   HEMATOLOGIC: No easy bruisability or excessive bleeding.   MUSCULOSKELETAL: Denies joint pain or swelling.   NEUROLOGIC: Denies syncope or weakness.   PSYCHIATRIC: Denies depression, anxiety or mood swings.    PE:   APPEARANCE: Well nourished, well developed, Black or  female in no acute distress.  NODES: no cervical, supraclavicular, or inguinal lymphadenopathy  BREASTS: Symmetrical, no skin changes or visible lesions. No palpable masses, nipple discharge or adenopathy bilaterally.  ABDOMEN: Soft. No tenderness or masses. No distention. No hernias palpated. No CVA  tenderness.  VULVA: No lesions. Normal external female genitalia.  URETHRAL MEATUS: Normal size and location, no lesions, no prolapse.  URETHRA: No masses, tenderness, or prolapse.  VAGINA: Moist. No lesions or lacerations noted. No abnormal discharge present. No odor present.   CERVIX: No lesions or discharge. No cervical motion tenderness.   UTERUS: Normal size, regular shape, mobile, non-tender.  ADNEXA: No tenderness. No fullness or masses palpated in the adnexal regions.   ANUS PERINEUM: Normal.      Diagnosis:  1. Well woman exam with routine gynecological exam    2. Uses Depo-Provera as primary birth control method        Plan:     Orders Placed This Encounter    Vaginosis Screen by DNA Probe    medroxyPROGESTERone (DEPO-PROVERA) 150 mg/mL injection       1. Depo refilled  2. Pap current  3. Affirm pending    Patient was counseled today on the new ACS guidelines for cervical cytology screening as well as the current recommendations for breast cancer screening. She was counseled to follow up with her PCP for other routine health maintenance. Counseling session lasted approximately 10 minutes, and all her questions were answered.    Follow-up with me in 1 year for routine exam; pap in 2 years.

## 2020-12-24 ENCOUNTER — CLINICAL SUPPORT (OUTPATIENT)
Dept: URGENT CARE | Facility: CLINIC | Age: 30
End: 2020-12-24
Payer: COMMERCIAL

## 2020-12-24 VITALS — HEART RATE: 79 BPM | OXYGEN SATURATION: 98 % | TEMPERATURE: 99 F

## 2020-12-24 DIAGNOSIS — Z03.818 ENCOUNTER FOR OBSERVATION FOR SUSPECTED EXPOSURE TO OTHER BIOLOGICAL AGENTS RULED OUT: Primary | ICD-10-CM

## 2020-12-24 LAB
CTP QC/QA: YES
SARS-COV-2 RDRP RESP QL NAA+PROBE: NEGATIVE

## 2020-12-24 PROCEDURE — U0002: ICD-10-PCS | Mod: QW,S$GLB,, | Performed by: INTERNAL MEDICINE

## 2020-12-24 PROCEDURE — U0002 COVID-19 LAB TEST NON-CDC: HCPCS | Mod: QW,S$GLB,, | Performed by: INTERNAL MEDICINE

## 2021-01-05 ENCOUNTER — PATIENT MESSAGE (OUTPATIENT)
Dept: ADMINISTRATIVE | Facility: HOSPITAL | Age: 31
End: 2021-01-05

## 2021-01-06 ENCOUNTER — PATIENT MESSAGE (OUTPATIENT)
Dept: ENDOCRINOLOGY | Facility: CLINIC | Age: 31
End: 2021-01-06

## 2021-01-06 ENCOUNTER — OFFICE VISIT (OUTPATIENT)
Dept: ENDOCRINOLOGY | Facility: CLINIC | Age: 31
End: 2021-01-06
Payer: COMMERCIAL

## 2021-01-06 DIAGNOSIS — E05.00 GRAVES DISEASE: Primary | ICD-10-CM

## 2021-01-06 PROCEDURE — 99214 PR OFFICE/OUTPT VISIT, EST, LEVL IV, 30-39 MIN: ICD-10-PCS | Mod: 95,,, | Performed by: INTERNAL MEDICINE

## 2021-01-06 PROCEDURE — 99214 OFFICE O/P EST MOD 30 MIN: CPT | Mod: 95,,, | Performed by: INTERNAL MEDICINE

## 2021-01-08 LAB
ALBUMIN SERPL-MCNC: 4.1 G/DL (ref 3.6–5.1)
ALBUMIN/GLOB SERPL: 1.4 (CALC) (ref 1–2.5)
ALP SERPL-CCNC: 52 U/L (ref 31–125)
ALT SERPL-CCNC: 15 U/L (ref 6–29)
AST SERPL-CCNC: 16 U/L (ref 10–30)
BILIRUB SERPL-MCNC: 0.9 MG/DL (ref 0.2–1.2)
BUN SERPL-MCNC: 11 MG/DL (ref 7–25)
BUN/CREAT SERPL: NORMAL (CALC) (ref 6–22)
CALCIUM SERPL-MCNC: 9.5 MG/DL (ref 8.6–10.2)
CHLORIDE SERPL-SCNC: 107 MMOL/L (ref 98–110)
CO2 SERPL-SCNC: 22 MMOL/L (ref 20–32)
CREAT SERPL-MCNC: 0.83 MG/DL (ref 0.5–1.1)
GFRSERPLBLD MDRD-ARVRAT: 95 ML/MIN/1.73M2
GLOBULIN SER CALC-MCNC: 3 G/DL (CALC) (ref 1.9–3.7)
GLUCOSE SERPL-MCNC: 89 MG/DL (ref 65–99)
POTASSIUM SERPL-SCNC: 4 MMOL/L (ref 3.5–5.3)
PROT SERPL-MCNC: 7.1 G/DL (ref 6.1–8.1)
SODIUM SERPL-SCNC: 142 MMOL/L (ref 135–146)
T3 SERPL-MCNC: 112 NG/DL (ref 76–181)
T4 FREE SERPL-MCNC: 1 NG/DL (ref 0.8–1.8)
TSH SERPL-ACNC: 1.15 MIU/L

## 2021-01-11 LAB — TSI SER-ACNC: <89 % BASELINE

## 2021-04-05 ENCOUNTER — PATIENT MESSAGE (OUTPATIENT)
Dept: ADMINISTRATIVE | Facility: HOSPITAL | Age: 31
End: 2021-04-05

## 2021-04-15 ENCOUNTER — TELEPHONE (OUTPATIENT)
Dept: INTERNAL MEDICINE | Facility: CLINIC | Age: 31
End: 2021-04-15

## 2021-04-26 ENCOUNTER — PATIENT MESSAGE (OUTPATIENT)
Dept: RESEARCH | Facility: HOSPITAL | Age: 31
End: 2021-04-26

## 2021-07-08 ENCOUNTER — PATIENT MESSAGE (OUTPATIENT)
Dept: ENDOCRINOLOGY | Facility: CLINIC | Age: 31
End: 2021-07-08

## 2021-07-08 DIAGNOSIS — E05.00 GRAVES DISEASE: Primary | ICD-10-CM

## 2021-07-12 ENCOUNTER — LAB VISIT (OUTPATIENT)
Dept: LAB | Facility: OTHER | Age: 31
End: 2021-07-12
Attending: INTERNAL MEDICINE
Payer: COMMERCIAL

## 2021-07-12 DIAGNOSIS — E05.00 GRAVES DISEASE: ICD-10-CM

## 2021-07-12 LAB
T4 FREE SERPL-MCNC: 0.96 NG/DL (ref 0.71–1.51)
TSH SERPL DL<=0.005 MIU/L-ACNC: 0.83 UIU/ML (ref 0.4–4)

## 2021-07-12 PROCEDURE — 84443 ASSAY THYROID STIM HORMONE: CPT | Performed by: INTERNAL MEDICINE

## 2021-07-12 PROCEDURE — 84480 ASSAY TRIIODOTHYRONINE (T3): CPT | Performed by: INTERNAL MEDICINE

## 2021-07-12 PROCEDURE — 36415 COLL VENOUS BLD VENIPUNCTURE: CPT | Performed by: INTERNAL MEDICINE

## 2021-07-12 PROCEDURE — 84439 ASSAY OF FREE THYROXINE: CPT | Performed by: INTERNAL MEDICINE

## 2021-07-13 LAB — T3 SERPL-MCNC: 103 NG/DL (ref 60–180)

## 2021-09-28 ENCOUNTER — PATIENT MESSAGE (OUTPATIENT)
Dept: OBSTETRICS AND GYNECOLOGY | Facility: CLINIC | Age: 31
End: 2021-09-28

## 2021-10-04 ENCOUNTER — PATIENT MESSAGE (OUTPATIENT)
Dept: ADMINISTRATIVE | Facility: HOSPITAL | Age: 31
End: 2021-10-04

## 2021-10-07 DIAGNOSIS — Z78.9 USES DEPO-PROVERA AS PRIMARY BIRTH CONTROL METHOD: ICD-10-CM

## 2021-10-07 RX ORDER — MEDROXYPROGESTERONE ACETATE 150 MG/ML
INJECTION, SUSPENSION INTRAMUSCULAR
Qty: 1 ML | Refills: 0 | Status: SHIPPED | OUTPATIENT
Start: 2021-10-07 | End: 2021-12-30 | Stop reason: SDUPTHER

## 2021-11-27 ENCOUNTER — CLINICAL SUPPORT (OUTPATIENT)
Dept: URGENT CARE | Facility: CLINIC | Age: 31
End: 2021-11-27
Payer: COMMERCIAL

## 2021-11-27 DIAGNOSIS — Z11.52 ENCOUNTER FOR SCREENING FOR COVID-19: Primary | ICD-10-CM

## 2021-11-27 LAB
CTP QC/QA: YES
SARS-COV-2 RDRP RESP QL NAA+PROBE: NEGATIVE

## 2021-11-27 PROCEDURE — U0002: ICD-10-PCS | Mod: QW,S$GLB,, | Performed by: NURSE PRACTITIONER

## 2021-11-27 PROCEDURE — 99211 PR OFFICE/OUTPT VISIT, EST, LEVL I: ICD-10-PCS | Mod: S$GLB,,, | Performed by: NURSE PRACTITIONER

## 2021-11-27 PROCEDURE — 99211 OFF/OP EST MAY X REQ PHY/QHP: CPT | Mod: S$GLB,,, | Performed by: NURSE PRACTITIONER

## 2021-11-27 PROCEDURE — U0002 COVID-19 LAB TEST NON-CDC: HCPCS | Mod: QW,S$GLB,, | Performed by: NURSE PRACTITIONER

## 2021-12-01 ENCOUNTER — PATIENT MESSAGE (OUTPATIENT)
Dept: ENDOSCOPY | Facility: HOSPITAL | Age: 31
End: 2021-12-01
Payer: COMMERCIAL

## 2021-12-01 ENCOUNTER — TELEPHONE (OUTPATIENT)
Dept: ENDOSCOPY | Facility: HOSPITAL | Age: 31
End: 2021-12-01
Payer: COMMERCIAL

## 2021-12-01 ENCOUNTER — OFFICE VISIT (OUTPATIENT)
Dept: GASTROENTEROLOGY | Facility: CLINIC | Age: 31
End: 2021-12-01
Payer: COMMERCIAL

## 2021-12-01 DIAGNOSIS — R14.0 ABDOMINAL BLOATING: ICD-10-CM

## 2021-12-01 DIAGNOSIS — K82.4 GALLBLADDER POLYP: ICD-10-CM

## 2021-12-01 DIAGNOSIS — Z86.19 HISTORY OF HELICOBACTER PYLORI INFECTION: ICD-10-CM

## 2021-12-01 DIAGNOSIS — K92.1 HEMATOCHEZIA: Primary | ICD-10-CM

## 2021-12-01 DIAGNOSIS — Z12.11 COLON CANCER SCREENING: Primary | ICD-10-CM

## 2021-12-01 DIAGNOSIS — R19.4 CHANGE IN BOWEL HABITS: ICD-10-CM

## 2021-12-01 PROCEDURE — 99214 PR OFFICE/OUTPT VISIT, EST, LEVL IV, 30-39 MIN: ICD-10-PCS | Mod: 95,,, | Performed by: INTERNAL MEDICINE

## 2021-12-01 PROCEDURE — 99214 OFFICE O/P EST MOD 30 MIN: CPT | Mod: 95,,, | Performed by: INTERNAL MEDICINE

## 2021-12-01 RX ORDER — SODIUM, POTASSIUM,MAG SULFATES 17.5-3.13G
1 SOLUTION, RECONSTITUTED, ORAL ORAL DAILY
Qty: 1 KIT | Refills: 0 | Status: SHIPPED | OUTPATIENT
Start: 2021-12-01 | End: 2021-12-03

## 2021-12-30 ENCOUNTER — OFFICE VISIT (OUTPATIENT)
Dept: OBSTETRICS AND GYNECOLOGY | Facility: CLINIC | Age: 31
End: 2021-12-30
Payer: COMMERCIAL

## 2021-12-30 ENCOUNTER — TELEPHONE (OUTPATIENT)
Dept: OBSTETRICS AND GYNECOLOGY | Facility: CLINIC | Age: 31
End: 2021-12-30

## 2021-12-30 VITALS
SYSTOLIC BLOOD PRESSURE: 110 MMHG | HEIGHT: 66 IN | DIASTOLIC BLOOD PRESSURE: 70 MMHG | BODY MASS INDEX: 21.33 KG/M2 | WEIGHT: 132.69 LBS

## 2021-12-30 DIAGNOSIS — Z12.4 PAP SMEAR FOR CERVICAL CANCER SCREENING: ICD-10-CM

## 2021-12-30 DIAGNOSIS — Z78.9 USES DEPO-PROVERA AS PRIMARY BIRTH CONTROL METHOD: ICD-10-CM

## 2021-12-30 DIAGNOSIS — Z01.419 WELL WOMAN EXAM WITH ROUTINE GYNECOLOGICAL EXAM: Primary | ICD-10-CM

## 2021-12-30 LAB
B-HCG UR QL: NEGATIVE
CTP QC/QA: YES

## 2021-12-30 PROCEDURE — 1159F PR MEDICATION LIST DOCUMENTED IN MEDICAL RECORD: ICD-10-PCS | Mod: CPTII,S$GLB,, | Performed by: NURSE PRACTITIONER

## 2021-12-30 PROCEDURE — 81025 POCT URINE PREGNANCY: ICD-10-PCS | Mod: S$GLB,,, | Performed by: NURSE PRACTITIONER

## 2021-12-30 PROCEDURE — 3074F PR MOST RECENT SYSTOLIC BLOOD PRESSURE < 130 MM HG: ICD-10-PCS | Mod: CPTII,S$GLB,, | Performed by: NURSE PRACTITIONER

## 2021-12-30 PROCEDURE — 1159F MED LIST DOCD IN RCRD: CPT | Mod: CPTII,S$GLB,, | Performed by: NURSE PRACTITIONER

## 2021-12-30 PROCEDURE — 87624 HPV HI-RISK TYP POOLED RSLT: CPT | Performed by: NURSE PRACTITIONER

## 2021-12-30 PROCEDURE — 99999 PR PBB SHADOW E&M-EST. PATIENT-LVL III: CPT | Mod: PBBFAC,,, | Performed by: NURSE PRACTITIONER

## 2021-12-30 PROCEDURE — 3074F SYST BP LT 130 MM HG: CPT | Mod: CPTII,S$GLB,, | Performed by: NURSE PRACTITIONER

## 2021-12-30 PROCEDURE — 99395 PR PREVENTIVE VISIT,EST,18-39: ICD-10-PCS | Mod: S$GLB,,, | Performed by: NURSE PRACTITIONER

## 2021-12-30 PROCEDURE — 99999 PR PBB SHADOW E&M-EST. PATIENT-LVL III: ICD-10-PCS | Mod: PBBFAC,,, | Performed by: NURSE PRACTITIONER

## 2021-12-30 PROCEDURE — 88141 PR  CYTOPATH CERV/VAG INTERPRET: ICD-10-PCS | Mod: ,,, | Performed by: PATHOLOGY

## 2021-12-30 PROCEDURE — 88175 CYTOPATH C/V AUTO FLUID REDO: CPT | Performed by: PATHOLOGY

## 2021-12-30 PROCEDURE — 3008F BODY MASS INDEX DOCD: CPT | Mod: CPTII,S$GLB,, | Performed by: NURSE PRACTITIONER

## 2021-12-30 PROCEDURE — 99395 PREV VISIT EST AGE 18-39: CPT | Mod: S$GLB,,, | Performed by: NURSE PRACTITIONER

## 2021-12-30 PROCEDURE — 81025 URINE PREGNANCY TEST: CPT | Mod: S$GLB,,, | Performed by: NURSE PRACTITIONER

## 2021-12-30 PROCEDURE — 3078F PR MOST RECENT DIASTOLIC BLOOD PRESSURE < 80 MM HG: ICD-10-PCS | Mod: CPTII,S$GLB,, | Performed by: NURSE PRACTITIONER

## 2021-12-30 PROCEDURE — 88141 CYTOPATH C/V INTERPRET: CPT | Mod: ,,, | Performed by: PATHOLOGY

## 2021-12-30 PROCEDURE — 3008F PR BODY MASS INDEX (BMI) DOCUMENTED: ICD-10-PCS | Mod: CPTII,S$GLB,, | Performed by: NURSE PRACTITIONER

## 2021-12-30 PROCEDURE — 3078F DIAST BP <80 MM HG: CPT | Mod: CPTII,S$GLB,, | Performed by: NURSE PRACTITIONER

## 2021-12-30 RX ORDER — CHLORHEXIDINE GLUCONATE ORAL RINSE 1.2 MG/ML
SOLUTION DENTAL
COMMUNITY
Start: 2021-10-31

## 2021-12-30 RX ORDER — MEDROXYPROGESTERONE ACETATE 150 MG/ML
INJECTION, SUSPENSION INTRAMUSCULAR
Qty: 1 ML | Refills: 3 | Status: SHIPPED | OUTPATIENT
Start: 2021-12-30 | End: 2021-12-30

## 2022-01-03 DIAGNOSIS — Z01.818 PRE-OP TESTING: ICD-10-CM

## 2022-01-07 LAB
FINAL PATHOLOGIC DIAGNOSIS: ABNORMAL
Lab: ABNORMAL

## 2022-01-12 LAB
HPV HR 12 DNA SPEC QL NAA+PROBE: POSITIVE
HPV16 AG SPEC QL: NEGATIVE
HPV18 DNA SPEC QL NAA+PROBE: NEGATIVE

## 2022-01-14 ENCOUNTER — TELEPHONE (OUTPATIENT)
Dept: OBSTETRICS AND GYNECOLOGY | Facility: CLINIC | Age: 32
End: 2022-01-14
Payer: COMMERCIAL

## 2022-01-14 ENCOUNTER — TELEPHONE (OUTPATIENT)
Dept: ENDOSCOPY | Facility: HOSPITAL | Age: 32
End: 2022-01-14
Payer: COMMERCIAL

## 2022-01-14 NOTE — TELEPHONE ENCOUNTER
----- Message from Reynaldo Moreno sent at 1/14/2022  1:52 PM CST -----  Contact: Patient  The pt called and would like to cancel her procedure on 2/2/22    The pt can be reached at [phone]

## 2022-01-14 NOTE — TELEPHONE ENCOUNTER
----- Message from Sabiha Tello NP sent at 1/14/2022 11:52 AM CST -----  Needs colposcopy. Please schedule with MD.

## 2022-01-14 NOTE — TELEPHONE ENCOUNTER
Called pt to discuss pap results- LGSIL.  All pt questions answered. Will schedule colpo.   S/p HPV vaccine.

## 2022-01-18 ENCOUNTER — OFFICE VISIT (OUTPATIENT)
Dept: OBSTETRICS AND GYNECOLOGY | Facility: CLINIC | Age: 32
End: 2022-01-18
Payer: COMMERCIAL

## 2022-01-18 VITALS
DIASTOLIC BLOOD PRESSURE: 70 MMHG | BODY MASS INDEX: 21.57 KG/M2 | SYSTOLIC BLOOD PRESSURE: 110 MMHG | WEIGHT: 134.25 LBS | HEIGHT: 66 IN

## 2022-01-18 DIAGNOSIS — N76.1 SUBACUTE VAGINITIS: Primary | ICD-10-CM

## 2022-01-18 PROCEDURE — 87510 GARDNER VAG DNA DIR PROBE: CPT | Performed by: ADVANCED PRACTICE MIDWIFE

## 2022-01-18 PROCEDURE — 3008F BODY MASS INDEX DOCD: CPT | Mod: CPTII,S$GLB,, | Performed by: ADVANCED PRACTICE MIDWIFE

## 2022-01-18 PROCEDURE — 99212 PR OFFICE/OUTPT VISIT, EST, LEVL II, 10-19 MIN: ICD-10-PCS | Mod: S$GLB,,, | Performed by: ADVANCED PRACTICE MIDWIFE

## 2022-01-18 PROCEDURE — 1159F PR MEDICATION LIST DOCUMENTED IN MEDICAL RECORD: ICD-10-PCS | Mod: CPTII,S$GLB,, | Performed by: ADVANCED PRACTICE MIDWIFE

## 2022-01-18 PROCEDURE — 3008F PR BODY MASS INDEX (BMI) DOCUMENTED: ICD-10-PCS | Mod: CPTII,S$GLB,, | Performed by: ADVANCED PRACTICE MIDWIFE

## 2022-01-18 PROCEDURE — 3074F SYST BP LT 130 MM HG: CPT | Mod: CPTII,S$GLB,, | Performed by: ADVANCED PRACTICE MIDWIFE

## 2022-01-18 PROCEDURE — 3078F DIAST BP <80 MM HG: CPT | Mod: CPTII,S$GLB,, | Performed by: ADVANCED PRACTICE MIDWIFE

## 2022-01-18 PROCEDURE — 99212 OFFICE O/P EST SF 10 MIN: CPT | Mod: S$GLB,,, | Performed by: ADVANCED PRACTICE MIDWIFE

## 2022-01-18 PROCEDURE — 99999 PR PBB SHADOW E&M-EST. PATIENT-LVL III: CPT | Mod: PBBFAC,,, | Performed by: ADVANCED PRACTICE MIDWIFE

## 2022-01-18 PROCEDURE — 99999 PR PBB SHADOW E&M-EST. PATIENT-LVL III: ICD-10-PCS | Mod: PBBFAC,,, | Performed by: ADVANCED PRACTICE MIDWIFE

## 2022-01-18 PROCEDURE — 3078F PR MOST RECENT DIASTOLIC BLOOD PRESSURE < 80 MM HG: ICD-10-PCS | Mod: CPTII,S$GLB,, | Performed by: ADVANCED PRACTICE MIDWIFE

## 2022-01-18 PROCEDURE — 1159F MED LIST DOCD IN RCRD: CPT | Mod: CPTII,S$GLB,, | Performed by: ADVANCED PRACTICE MIDWIFE

## 2022-01-18 PROCEDURE — 3074F PR MOST RECENT SYSTOLIC BLOOD PRESSURE < 130 MM HG: ICD-10-PCS | Mod: CPTII,S$GLB,, | Performed by: ADVANCED PRACTICE MIDWIFE

## 2022-01-18 NOTE — PROGRESS NOTES
Lela Garcia is a 31 y.o. female  presents to [facility] Clinic with complaint of intermittent vaginal discharge for 4 weeks.  She reports itching, and denies odor.  She states the discharge is white and sometimes thicker.  Pt has not tried any OTC treatments.         ROS:  GENERAL: No fever, chills, fatigability or weight loss.  VULVAR: No pain, no lesions and no itching.  VAGINAL: No relaxation, no abnormal bleeding and no lesions.  ABDOMEN: No abdominal pain. Denies nausea. Denies vomiting. No diarrhea. No constipation  BREAST: Denies pain. No lumps. No discharge.  URINARY: No incontinence, no nocturia, no frequency and no dysuria.        Review of patient's allergies indicates:   Allergen Reactions    Iodine and iodide containing products Shortness Of Breath    Shellfish containing products Other (See Comments)     Low BP    Allergen ext-venom-honey bee     Garfield     Cherries     Cocoa     Egg derived     Flowers     Garlic     Grass pollen-shawna grass standard     Grass pollen-orchardgrass, standard     Grass pollen-red top, standard     Grass pollen-sweet vernal grass, std.     Hornet venom     Milk containing products     Oats (sudhakar)     Orange     Pineapple     Soy     Tree pollen-sharon     Wasp venom     Watermelon     Weed pollen     Wheat containing prod     Yellow jacket venom        Current Outpatient Medications:     cetirizine (ZYRTEC) 10 MG tablet, Take 10 mg by mouth once daily., Disp: , Rfl:     chlorhexidine (PERIDEX) 0.12 % solution, SMARTSIG:By Mouth, Disp: , Rfl:     L-LYSINE ORAL, Take by mouth., Disp: , Rfl:     Lactobacillus acidophilus (PROBIOTIC ORAL), Take by mouth., Disp: , Rfl:     medroxyprogesterone (DEPO-SUBQ PROVERA) 104 mg/0.65 mL injection, Inject 0.65 mLs (104 mg total) into the skin every 3 (three) months., Disp: 2.6 mL, Rfl: 3    mv-min/iron/folic/calcium/vitK (WOMEN'S MULTIVITAMIN ORAL), Take by mouth., Disp: , Rfl:     valACYclovir  "(VALTREX) 500 MG tablet, TAKE 1 TABLET BY MOUTH TWICE DAILY AS NEEDED FOR OUTBREAK, Disp: 30 tablet, Rfl: 1    Past Medical History:   Diagnosis Date    Seasonal allergies      Past Surgical History:   Procedure Laterality Date    PELVIC LAPAROSCOPY      removal of IUD     Social History     Tobacco Use    Smoking status: Never Smoker    Smokeless tobacco: Never Used   Substance Use Topics    Alcohol use: Yes     Comment: occasional    Drug use: No     OB History    Para Term  AB Living   0             SAB IAB Ectopic Multiple Live Births                   /70   Ht 5' 6" (1.676 m)   Wt 60.9 kg (134 lb 4.2 oz)   LMP  (LMP Unknown)   BMI 21.67 kg/m²     PHYSICAL EXAM:  GENERAL: Calm and appropriate affect, alert, oriented x4  SKIN: Color appropriate for race, warm and dry, clean and intact with no rashes.  RESP: Even, unlabored breathing  ABDOMEN: Soft, nontender, no masses.  :   Normal external female genitalia without lesions. Adequate perineal body, normal urethral meatus.  Vagina pink and well rugated, no lesions, vaginal discharge - normal, no noticeable odor.   No significant cystocele or rectocele.  Cervix  no cervical motion tenderness.      ASSESSMENT / PLAN:    ICD-10-CM ICD-9-CM    1. Subacute vaginitis  N76.1 616.10 Vaginosis Screen by DNA Probe     Subacute vaginitis          Patient was counseled today on vaginal symptoms of BV/ Yeast. Discussed 1 month backlog for GC/ Ct cultures and advised will treat if pt with cocnerns re exposure. Pt will wait for testing- no concerns as to possible infection today.      FOLLOW UP:   Pending lab results, PRN lack of improvement.      MARILYN Ivey  "

## 2022-01-19 ENCOUNTER — PATIENT MESSAGE (OUTPATIENT)
Dept: OBSTETRICS AND GYNECOLOGY | Facility: CLINIC | Age: 32
End: 2022-01-19
Payer: COMMERCIAL

## 2022-01-19 LAB
CANDIDA RRNA VAG QL PROBE: POSITIVE
G VAGINALIS RRNA GENITAL QL PROBE: POSITIVE
T VAGINALIS RRNA GENITAL QL PROBE: NEGATIVE

## 2022-01-19 RX ORDER — FLUCONAZOLE 200 MG/1
200 TABLET ORAL EVERY OTHER DAY
Qty: 2 TABLET | Refills: 0 | Status: SHIPPED | OUTPATIENT
Start: 2022-01-19 | End: 2022-01-22

## 2022-01-19 RX ORDER — METRONIDAZOLE 500 MG/1
500 TABLET ORAL EVERY 12 HOURS
Qty: 14 TABLET | Refills: 0 | Status: SHIPPED | OUTPATIENT
Start: 2022-01-19 | End: 2022-01-26

## 2022-01-26 ENCOUNTER — PATIENT OUTREACH (OUTPATIENT)
Dept: ADMINISTRATIVE | Facility: HOSPITAL | Age: 32
End: 2022-01-26
Payer: COMMERCIAL

## 2022-02-09 ENCOUNTER — PROCEDURE VISIT (OUTPATIENT)
Dept: OBSTETRICS AND GYNECOLOGY | Facility: CLINIC | Age: 32
End: 2022-02-09
Payer: COMMERCIAL

## 2022-02-09 VITALS
BODY MASS INDEX: 21.57 KG/M2 | SYSTOLIC BLOOD PRESSURE: 118 MMHG | DIASTOLIC BLOOD PRESSURE: 76 MMHG | WEIGHT: 134.25 LBS | HEIGHT: 66 IN

## 2022-02-09 DIAGNOSIS — R87.612 LGSIL ON PAP SMEAR OF CERVIX: Primary | ICD-10-CM

## 2022-02-09 DIAGNOSIS — B97.7 HIGH RISK HPV INFECTION: ICD-10-CM

## 2022-02-09 PROCEDURE — 88342 IMHCHEM/IMCYTCHM 1ST ANTB: CPT | Performed by: PATHOLOGY

## 2022-02-09 PROCEDURE — 57454 BX/CURETT OF CERVIX W/SCOPE: CPT | Mod: S$GLB,,, | Performed by: OBSTETRICS & GYNECOLOGY

## 2022-02-09 PROCEDURE — 88305 TISSUE EXAM BY PATHOLOGIST: CPT | Mod: 59 | Performed by: PATHOLOGY

## 2022-02-09 PROCEDURE — 88342 IMHCHEM/IMCYTCHM 1ST ANTB: CPT | Mod: 26,,, | Performed by: PATHOLOGY

## 2022-02-09 PROCEDURE — 88305 TISSUE EXAM BY PATHOLOGIST: ICD-10-PCS | Mod: 26,,, | Performed by: PATHOLOGY

## 2022-02-09 PROCEDURE — 57454 COLPOSCOPY: ICD-10-PCS | Mod: S$GLB,,, | Performed by: OBSTETRICS & GYNECOLOGY

## 2022-02-09 PROCEDURE — 88342 CHG IMMUNOCYTOCHEMISTRY: ICD-10-PCS | Mod: 26,,, | Performed by: PATHOLOGY

## 2022-02-09 PROCEDURE — 88305 TISSUE EXAM BY PATHOLOGIST: CPT | Mod: 26,,, | Performed by: PATHOLOGY

## 2022-02-09 RX ORDER — MESALAMINE 1.2 G/1
TABLET, DELAYED RELEASE ORAL
COMMUNITY
Start: 2022-02-02

## 2022-02-09 NOTE — PROCEDURES
"Colposcopy    Date/Time: 2022 10:45 AM  Performed by: Mari Ba MD  Authorized by: Mari Ba MD     Consent Done?:  Yes (Written)  Timeout:Immediately prior to procedure a time out was called to verify the correct patient, procedure, equipment, support staff and site/side marked as required  Assistants?: Yes    List of assistants:  Telma Lazo MA    Colposcopy Site:  Cervix  Position:  Supine   Patient was prepped and draped in the normal sterile fashion.  Acrowhite Lesion? Yes    Atypical Vessels: No    Transformation Zone Adequate?: Yes    Biopsy?: Yes         Location:  Cervix ((4 00 and 7 00))  ECC Performed?: Yes    LEEP Performed?: No    Estimated blood loss (cc):  1   Patient tolerated the procedure well with no immediate complications.   Post-operative instructions were provided for the patient.   Patient was discharged and will follow up if any complications occur      Colposcopy:    Lela Garcia is a 31 y.o. female   presents for colposcopy.   Her most recent pap smear shows LSIL/HPV HR+ other. Denies h/o abnormal paps, colposcopies, or excisional procedures. Denies tobacco use. No vaginal complaints today. Does have a h/o hyperthyroidism and Ulcerative Colitis.     The abnormal test results were discussed.  We also discussed HPV infection and its association with abnormal Pap tests and cervical cancer.  The risks and benefits of colposcopy were reviewed.  She agrees to proceed.      UPT is negative    Vitals:  Vitals:    22 1109   BP: 118/76   Weight: 60.9 kg (134 lb 4.2 oz)   Height: 5' 6" (1.676 m)   PainSc: 0-No pain     Body mass index is 21.67 kg/m².    Colposcopy Exam:   TIME OUT PERFORMED.     General Assessment:  Cervix fully visualized: Yes  Squamocolumnar junction fully visualized: Yes, although progesterone effect/atrophy noted    Colposcopic Impression:   Low-grade/mild dysplasia. AWFL most prominent at 4 and 7 o'clock    Biopsy: 4 o'clock, 7 o'clock.  " ECC: was performed     Hemostasis was adequate with application of silver nitrate solution.  The speculum was removed.  The patient tolerated the procedure well.  All collected specimens sent to pathology for histologic analysis.    Assessment and Plan:  LGSIL on Pap smear of cervix  -     POCT urine pregnancy  -     Specimen to Pathology, Ob/Gyn    High risk HPV infection  -     POCT urine pregnancy  -     Specimen to Pathology, Ob/Gyn    Other orders  -     Colposcopy        Post-colposcopy counseling:  For cramping take NSAIDs, Tylenol, or a prescription pain medication per the medication card.    Avoid intercourse, douching, or tampons in the vagina for at least 7 days.   Expect a coffee ground discharge due to silver nitrate.   Call the office for heavy bleeding, significant pain, or a  foul-smelling vaginal discharge.  The HPV vaccine was  recommended according to FDA age guidelines.  The importance of keeping follow-up appointments was discussed.  Final plan and follow-up will be based on colposcopy results.  Virtual visit to discuss results 1-2 weeks.      Mari Ba MD  Department of Obstetrics & Gynecology  [facility]

## 2022-02-17 LAB
FINAL PATHOLOGIC DIAGNOSIS: NORMAL
Lab: NORMAL

## 2022-02-24 ENCOUNTER — OFFICE VISIT (OUTPATIENT)
Dept: OBSTETRICS AND GYNECOLOGY | Facility: CLINIC | Age: 32
End: 2022-02-24
Payer: COMMERCIAL

## 2022-02-24 DIAGNOSIS — B97.7 HIGH RISK HPV INFECTION: ICD-10-CM

## 2022-02-24 DIAGNOSIS — R87.612 LGSIL ON PAP SMEAR OF CERVIX: Primary | ICD-10-CM

## 2022-02-24 PROCEDURE — 99213 PR OFFICE/OUTPT VISIT, EST, LEVL III, 20-29 MIN: ICD-10-PCS | Mod: 95,,, | Performed by: OBSTETRICS & GYNECOLOGY

## 2022-02-24 PROCEDURE — 1160F PR REVIEW ALL MEDS BY PRESCRIBER/CLIN PHARMACIST DOCUMENTED: ICD-10-PCS | Mod: CPTII,95,, | Performed by: OBSTETRICS & GYNECOLOGY

## 2022-02-24 PROCEDURE — 1159F MED LIST DOCD IN RCRD: CPT | Mod: CPTII,95,, | Performed by: OBSTETRICS & GYNECOLOGY

## 2022-02-24 PROCEDURE — 99213 OFFICE O/P EST LOW 20 MIN: CPT | Mod: 95,,, | Performed by: OBSTETRICS & GYNECOLOGY

## 2022-02-24 PROCEDURE — 1160F RVW MEDS BY RX/DR IN RCRD: CPT | Mod: CPTII,95,, | Performed by: OBSTETRICS & GYNECOLOGY

## 2022-02-24 PROCEDURE — 1159F PR MEDICATION LIST DOCUMENTED IN MEDICAL RECORD: ICD-10-PCS | Mod: CPTII,95,, | Performed by: OBSTETRICS & GYNECOLOGY

## 2022-02-24 NOTE — ASSESSMENT & PLAN NOTE
Colposcopy results reflecting LGSIL, ECC benign. Discussed need for repeat pap and cotesting in 1 year. All questions answered to patient's apparent satisfaction.

## 2022-02-24 NOTE — PROGRESS NOTES
The patient location is: [facility]  The chief complaint leading to consultation is: Colpo results  Visit type: audiovisual  Total time spent with patient: 10 minutes    Each patient to whom he or she provides medical services by telemedicine is:  (1) informed of the relationship between the physician and patient and the respective role of any other health care provider with respect to management of the patient; and (2) notified that he or she may decline to receive medical services by telemedicine and may withdraw from such care at any time.      Chief Complaint: Abnormal pap smear/Colpo     HPI:      Lela Garcia is a 31 y.o.  who presents via virtual visit to discuss colposcopy results. See below. She is doing well today with no new complaints or concerns.         ROS:     GENERAL: Denies fevers or chills. Feeling well overall.   ABDOMEN: Denies abdominal pain, constipation, diarrhea, nausea, vomiting, change in appetite.   URINARY: Denies frequency, dysuria, hematuria.  GYNECOLOGIC: See HPI.  NEUROLOGIC: Denies syncope or weakness.     Physical Exam:      PHYSICAL EXAM:  There were no vitals taken for this visit.  There is no height or weight on file to calculate BMI.     APPEARANCE: Well nourished, well developed, in no acute distress.  Exam deferred due to televisit    Results:      Pap (2021): LSIL/HPV HR+ other  Colpo (22): 3:00 LGSIL, 9:00 LGSIL, ECC benign    Assessment/Plan:     Problem List Items Addressed This Visit        Renal/    LGSIL on Pap smear of cervix - Primary    Current Assessment & Plan     Colposcopy results reflecting LGSIL, ECC benign. Discussed need for repeat pap and cotesting in 1 year. All questions answered to patient's apparent satisfaction.            High risk HPV infection            Counseling:       Use of the [employer] Patient Portal discussed and encouraged during today's visit.       Mari Ba MD  Department of Obstetrics & Gynecology  [facility]

## 2022-04-22 ENCOUNTER — TELEPHONE (OUTPATIENT)
Dept: ENDOCRINOLOGY | Facility: CLINIC | Age: 32
End: 2022-04-22
Payer: COMMERCIAL

## 2022-04-22 ENCOUNTER — PATIENT MESSAGE (OUTPATIENT)
Dept: ENDOCRINOLOGY | Facility: CLINIC | Age: 32
End: 2022-04-22
Payer: COMMERCIAL

## 2022-04-22 NOTE — TELEPHONE ENCOUNTER
Please contact patient to let her know that since her last appointment was over a year ago (1/6/2021), I won't be able to order additional tests at this time until after a follow-up appointment. Encourage patient to reach out to the office if interested in scheduling something (telemedicine okay), alternatively if she prefers she can check in with primary care and see if they'd be able to order labs for her if she has been there more recently.

## 2022-12-21 ENCOUNTER — TELEPHONE (OUTPATIENT)
Dept: OBSTETRICS AND GYNECOLOGY | Facility: CLINIC | Age: 32
End: 2022-12-21
Payer: COMMERCIAL

## 2023-01-18 ENCOUNTER — OFFICE VISIT (OUTPATIENT)
Dept: OBSTETRICS AND GYNECOLOGY | Facility: CLINIC | Age: 33
End: 2023-01-18
Payer: COMMERCIAL

## 2023-01-18 VITALS — WEIGHT: 123.88 LBS | DIASTOLIC BLOOD PRESSURE: 86 MMHG | SYSTOLIC BLOOD PRESSURE: 130 MMHG | BODY MASS INDEX: 20 KG/M2

## 2023-01-18 DIAGNOSIS — Z78.9 USES DEPO-PROVERA AS PRIMARY BIRTH CONTROL METHOD: ICD-10-CM

## 2023-01-18 DIAGNOSIS — Z30.09 BIRTH CONTROL COUNSELING: ICD-10-CM

## 2023-01-18 DIAGNOSIS — Z12.4 PAP SMEAR FOR CERVICAL CANCER SCREENING: ICD-10-CM

## 2023-01-18 DIAGNOSIS — Z01.419 WELL WOMAN EXAM WITH ROUTINE GYNECOLOGICAL EXAM: Primary | ICD-10-CM

## 2023-01-18 PROCEDURE — 88175 CYTOPATH C/V AUTO FLUID REDO: CPT | Performed by: PATHOLOGY

## 2023-01-18 PROCEDURE — 87624 HPV HI-RISK TYP POOLED RSLT: CPT | Performed by: NURSE PRACTITIONER

## 2023-01-18 PROCEDURE — 3075F SYST BP GE 130 - 139MM HG: CPT | Mod: CPTII,S$GLB,, | Performed by: NURSE PRACTITIONER

## 2023-01-18 PROCEDURE — 3079F DIAST BP 80-89 MM HG: CPT | Mod: CPTII,S$GLB,, | Performed by: NURSE PRACTITIONER

## 2023-01-18 PROCEDURE — 99999 PR PBB SHADOW E&M-EST. PATIENT-LVL III: CPT | Mod: PBBFAC,,, | Performed by: NURSE PRACTITIONER

## 2023-01-18 PROCEDURE — 1159F PR MEDICATION LIST DOCUMENTED IN MEDICAL RECORD: ICD-10-PCS | Mod: CPTII,S$GLB,, | Performed by: NURSE PRACTITIONER

## 2023-01-18 PROCEDURE — 99999 PR PBB SHADOW E&M-EST. PATIENT-LVL III: ICD-10-PCS | Mod: PBBFAC,,, | Performed by: NURSE PRACTITIONER

## 2023-01-18 PROCEDURE — 3008F PR BODY MASS INDEX (BMI) DOCUMENTED: ICD-10-PCS | Mod: CPTII,S$GLB,, | Performed by: NURSE PRACTITIONER

## 2023-01-18 PROCEDURE — 99395 PREV VISIT EST AGE 18-39: CPT | Mod: S$GLB,,, | Performed by: NURSE PRACTITIONER

## 2023-01-18 PROCEDURE — 1160F RVW MEDS BY RX/DR IN RCRD: CPT | Mod: CPTII,S$GLB,, | Performed by: NURSE PRACTITIONER

## 2023-01-18 PROCEDURE — 1159F MED LIST DOCD IN RCRD: CPT | Mod: CPTII,S$GLB,, | Performed by: NURSE PRACTITIONER

## 2023-01-18 PROCEDURE — 3075F PR MOST RECENT SYSTOLIC BLOOD PRESS GE 130-139MM HG: ICD-10-PCS | Mod: CPTII,S$GLB,, | Performed by: NURSE PRACTITIONER

## 2023-01-18 PROCEDURE — 3079F PR MOST RECENT DIASTOLIC BLOOD PRESSURE 80-89 MM HG: ICD-10-PCS | Mod: CPTII,S$GLB,, | Performed by: NURSE PRACTITIONER

## 2023-01-18 PROCEDURE — 3008F BODY MASS INDEX DOCD: CPT | Mod: CPTII,S$GLB,, | Performed by: NURSE PRACTITIONER

## 2023-01-18 PROCEDURE — 1160F PR REVIEW ALL MEDS BY PRESCRIBER/CLIN PHARMACIST DOCUMENTED: ICD-10-PCS | Mod: CPTII,S$GLB,, | Performed by: NURSE PRACTITIONER

## 2023-01-18 PROCEDURE — 99395 PR PREVENTIVE VISIT,EST,18-39: ICD-10-PCS | Mod: S$GLB,,, | Performed by: NURSE PRACTITIONER

## 2023-01-18 NOTE — PROGRESS NOTES
CC: Annual  HPI: Pt is a 32 y.o.  female who presents for routine annual exam. She uses depo sq for contraception. She does not want STD screening. Loves depo and associated amenorrhea. Knows potential for reversible bone loss with extended use. Did IUD in past that had to be surgically removed- may try nuvaring and not do the week off. Sometimes has issues with insurance coverage for sub Q depo.     Notes from last visit with me in 2021-  CC: Annual  HPI: Pt is a 31 y.o.  female who presents for routine annual exam. She uses depo for contraception. She does not want STD screening. Questions about sub Q depo shots. Friend who is NP gives her the injections. Does bee keeping.   Notes from last visit with me in 2020-  CC: Annual  HPI: Pt is a 30 y.o.  female who presents for routine annual exam. She uses depo for contraception. She is happy with it and desires a refill today. She has been on it since 2016 and is aware of the potential for temporary bone loss. She has done an IUD in the past but had to have a lap surgery for removal due to migration of the device. She is good with exercising and does take a multivitamin with calcium/Vitamin D. She does not want STD screening. Denies vaginal itching or odor today but is requesting a vaginosis screen for reassurance.      FH:   Breast cancer: none  Colon cancer: none  Ovarian cancer: none  Uterine cancer: none    HPV vaccine: yes    Last pap smear: 2022 lgsil with hpv pos  History of abnormal pap smears: yes- colpo 2022  Colonoscopy: na   DEXA: na  Mammogram: na  STD history: HSV  Birth control: depo  OB history: G0  Tobacco use: no    ROS:  GENERAL: Feeling well overall. Denies fever or chills.   SKIN: Denies rash or lesions.   HEAD: Denies head injury or headache.   NODES: Denies enlarged lymph nodes.   CHEST: Denies chest pain or shortness of breath.   CARDIOVASCULAR: Denies palpitations or left sided chest pain.   ABDOMEN: No abdominal pain, constipation, diarrhea,  nausea, vomiting or rectal bleeding.   URINARY: No dysuria, hematuria, or burning on urination.  REPRODUCTIVE: See HPI.   BREASTS: Denies pain, lumps, or nipple discharge.   HEMATOLOGIC: No easy bruisability or excessive bleeding.   MUSCULOSKELETAL: Denies joint pain or swelling.   NEUROLOGIC: Denies syncope or weakness.   PSYCHIATRIC: Denies depression, anxiety or mood swings.    PE:   APPEARANCE: Well nourished, well developed, Black or  female in no acute distress.  NODES: no cervical, supraclavicular, or inguinal lymphadenopathy  BREASTS: Symmetrical, no skin changes or visible lesions. No palpable masses, nipple discharge or adenopathy bilaterally.  ABDOMEN: Soft. No tenderness or masses. No distention. No hernias palpated. No CVA tenderness.  VULVA: No lesions. Normal external female genitalia.  URETHRAL MEATUS: Normal size and location, no lesions, no prolapse.  URETHRA: No masses, tenderness, or prolapse.  VAGINA: Moist. No lesions or lacerations noted. No abnormal discharge present. No odor present.   CERVIX: No lesions or discharge. No cervical motion tenderness.   UTERUS: Normal size, regular shape, mobile, non-tender.  ADNEXA: No tenderness. No fullness or masses palpated in the adnexal regions.   ANUS PERINEUM: Normal.      Diagnosis:  1. Well woman exam with routine gynecological exam    2. Birth control counseling    3. Pap smear for cervical cancer screening    4. Uses Depo-Provera as primary birth control method        Plan:     Orders Placed This Encounter    HPV High Risk Genotypes, PCR    Liquid-Based Pap Smear, Screening    medroxyprogesterone (DEPO-SUBQ PROVERA) 104 mg/0.65 mL injection     Pap updated  Declines std screening  Depo refilled- counseled on other options. Declines IUD, may do nuvaring or extended cycle pill in future.     Patient was counseled today on the new ACS guidelines for cervical cytology screening as well as the current recommendations for breast cancer  screening. She was counseled to follow up with her PCP for other routine health maintenance. Counseling session lasted approximately 15 minutes, and all her questions were answered.  For women over the age of 65, you can stop having cervical cancer screenings if you have never had abnormal cervical cells or cervical cancer, and youve had three negative Pap tests in a row. (You also can stop screening if youve had two negative Pap and HPV tests in a row in the past 10 years, with at least one test in the past 5 years.),    Follow-up with me in 1 year for routine exam; pap in 3 years.     I spent a total of 25 minutes on the day of the visit.This includes face to face time and non-face to face time preparing to see the patient (eg, review of tests), obtaining and/or reviewing separately obtained history, documenting clinical information in the electronic or other health record, independently interpreting results and communicating results to the patient/family/caregiver, or care coordinator.    As of April 1, 2021, the Cures Act has been passed nationally. This new law requires that all doctors progress notes, lab results, pathology reports and radiology reports be released IMMEDIATELY to the patient in the patient portal. That means that the results are released to you at the EXACT same time they are released to me. Therefore, with all of the patients that I have I am not able to reply to each patient exactly when the results come in. So there will be a delay from when you see the results to when I see them and have time to come up with a response to send you. Also I only see these results when I am on the computer at work. So if the results come in over the weekend or after 5 pm of a work day, I will not see them until the next business day. As you can tell, this is a challenge as a provider to give every patient the quick response they hope for and deserve. So please be patient!     Thanks for your understanding  and patience.

## 2023-01-25 LAB
FINAL PATHOLOGIC DIAGNOSIS: ABNORMAL
HPV HR 12 DNA SPEC QL NAA+PROBE: POSITIVE
HPV16 AG SPEC QL: NEGATIVE
HPV18 DNA SPEC QL NAA+PROBE: NEGATIVE
Lab: ABNORMAL

## 2023-01-26 ENCOUNTER — TELEPHONE (OUTPATIENT)
Dept: OBSTETRICS AND GYNECOLOGY | Facility: CLINIC | Age: 33
End: 2023-01-26
Payer: COMMERCIAL

## 2023-01-26 NOTE — TELEPHONE ENCOUNTER
----- Message from Sabiha Tello NP sent at 1/25/2023  3:15 PM CST -----  Hi- this pt needs another colpo. She had one last year with Mejia, please schedule.

## 2023-01-31 ENCOUNTER — PATIENT MESSAGE (OUTPATIENT)
Dept: OBSTETRICS AND GYNECOLOGY | Facility: CLINIC | Age: 33
End: 2023-01-31
Payer: COMMERCIAL

## 2023-02-02 ENCOUNTER — PATIENT MESSAGE (OUTPATIENT)
Dept: OBSTETRICS AND GYNECOLOGY | Facility: CLINIC | Age: 33
End: 2023-02-02
Payer: COMMERCIAL

## 2023-03-06 ENCOUNTER — PROCEDURE VISIT (OUTPATIENT)
Dept: OBSTETRICS AND GYNECOLOGY | Facility: CLINIC | Age: 33
End: 2023-03-06
Payer: COMMERCIAL

## 2023-03-06 VITALS
SYSTOLIC BLOOD PRESSURE: 120 MMHG | DIASTOLIC BLOOD PRESSURE: 81 MMHG | WEIGHT: 120.81 LBS | BODY MASS INDEX: 19.42 KG/M2 | HEIGHT: 66 IN

## 2023-03-06 DIAGNOSIS — R87.610 ASCUS WITH POSITIVE HIGH RISK HPV CERVICAL: Primary | ICD-10-CM

## 2023-03-06 DIAGNOSIS — R87.810 ASCUS WITH POSITIVE HIGH RISK HPV CERVICAL: Primary | ICD-10-CM

## 2023-03-06 PROCEDURE — 57454 COLPOSCOPY: ICD-10-PCS | Mod: S$GLB,,, | Performed by: OBSTETRICS & GYNECOLOGY

## 2023-03-06 PROCEDURE — 88305 TISSUE EXAM BY PATHOLOGIST: CPT | Mod: 59 | Performed by: PATHOLOGY

## 2023-03-06 PROCEDURE — 88305 TISSUE EXAM BY PATHOLOGIST: CPT | Mod: 26,,, | Performed by: PATHOLOGY

## 2023-03-06 PROCEDURE — 57454 BX/CURETT OF CERVIX W/SCOPE: CPT | Mod: S$GLB,,, | Performed by: OBSTETRICS & GYNECOLOGY

## 2023-03-06 PROCEDURE — 88305 TISSUE EXAM BY PATHOLOGIST: ICD-10-PCS | Mod: 26,,, | Performed by: PATHOLOGY

## 2023-03-06 NOTE — PROCEDURES
"Colposcopy    Date/Time: 3/6/2023 2:45 PM  Performed by: Mari Ba MD  Authorized by: Mari Ba MD     Consent Done?:  Yes (Written)  Timeout:Immediately prior to procedure a time out was called to verify the correct patient, procedure, equipment, support staff and site/side marked as required    Colposcopy Site:  Cervix  Position:  Supine  Acrowhite Lesion? Yes    Atypical Vessels: No    Transformation Zone Adequate?: Yes    Biopsy?: Yes         Location:  Cervix ((12 00))  ECC Performed?: Yes    LEEP Performed?: No    Estimated blood loss (cc):  1   Patient tolerated the procedure well with no immediate complications.   Post-operative instructions were provided for the patient.    Colposcopy:    Lela Garcia is a 32 y.o. female   presents for colposcopy. Her most recent pap smear shows ASCUS/HPV HR+ other, history of JUNG 1 on colposcopy last year. No history of excisional procedures. DMPA for contraception. Denies vaginal complaints today.  Denies tobacco use. She received Gardasil vaccine.     The abnormal test results were discussed.  We also discussed HPV infection and its association with abnormal Pap tests and cervical cancer.  The risks and benefits of colposcopy were reviewed.  She agrees to proceed.      UPT is negative    Vitals:  Vitals:    23 1501   BP: 120/81   Weight: 54.8 kg (120 lb 13 oz)   Height: 5' 6" (1.676 m)   PainSc: 0-No pain     Body mass index is 19.5 kg/m².    Colposcopy Exam:   TIME OUT PERFORMED.     General Assessment:  Cervix fully visualized: Yes  Squamocolumnar junction fully visualized: Yes    Abnormal colposcopic findings:   Lesion(s)  present: Yes   Lesion fully visualized: Yes   Size of lesion: 2-3 mm   Description: Thin AWFL with hypervascularity at 12:00    Colposcopic Impression:   Low-grade/Mild dysplasia    Biopsy: 12 o'clock.  ECC: was performed      Hemostasis was adequate with application of Monsel's solution.  The speculum was " removed.  The patient tolerated the procedure well.  All collected specimens sent to pathology for histologic analysis.    Assessment and Plan:  ASCUS with positive high risk HPV cervical    Other orders  -     Colposcopy        Post-colposcopy counseling:  For cramping take NSAIDs, Tylenol, or a prescription pain medication per the medication card.    Avoid intercourse, douching, or tampons in the vagina for at least 7 days.   Expect an abnormal discharge for several days.   Call the office for heavy bleeding, significant pain, or a  foul-smelling vaginal discharge.  The HPV vaccine was  recommended according to FDA age guidelines.  The importance of keeping follow-up appointments was discussed.  Final plan and follow-up will be based on colposcopy results.  The patient will be notified by phone or via the patient portal with results.      Mari Ba MD  Department of Obstetrics & Gynecology  [facility]

## 2023-03-14 LAB
FINAL PATHOLOGIC DIAGNOSIS: NORMAL
Lab: NORMAL

## 2023-04-03 ENCOUNTER — PATIENT MESSAGE (OUTPATIENT)
Dept: OBSTETRICS AND GYNECOLOGY | Facility: CLINIC | Age: 33
End: 2023-04-03
Payer: COMMERCIAL

## 2023-04-04 DIAGNOSIS — Z78.9 USES DEPO-PROVERA AS PRIMARY BIRTH CONTROL METHOD: ICD-10-CM

## 2023-04-11 ENCOUNTER — TELEPHONE (OUTPATIENT)
Dept: PHARMACY | Facility: CLINIC | Age: 33
End: 2023-04-11
Payer: COMMERCIAL

## 2023-06-25 DIAGNOSIS — B00.9 HSV INFECTION: ICD-10-CM

## 2023-06-26 RX ORDER — VALACYCLOVIR HYDROCHLORIDE 1 G/1
TABLET, FILM COATED ORAL
Qty: 90 TABLET | Refills: 1 | Status: SHIPPED | OUTPATIENT
Start: 2023-06-26

## 2023-07-10 ENCOUNTER — LAB VISIT (OUTPATIENT)
Dept: LAB | Facility: OTHER | Age: 33
End: 2023-07-10
Attending: INTERNAL MEDICINE
Payer: COMMERCIAL

## 2023-07-10 ENCOUNTER — OFFICE VISIT (OUTPATIENT)
Dept: INTERNAL MEDICINE | Facility: CLINIC | Age: 33
End: 2023-07-10
Attending: INTERNAL MEDICINE
Payer: COMMERCIAL

## 2023-07-10 VITALS
DIASTOLIC BLOOD PRESSURE: 72 MMHG | OXYGEN SATURATION: 98 % | SYSTOLIC BLOOD PRESSURE: 118 MMHG | HEIGHT: 66 IN | WEIGHT: 126.13 LBS | BODY MASS INDEX: 20.27 KG/M2 | HEART RATE: 66 BPM

## 2023-07-10 DIAGNOSIS — E05.00 GRAVES DISEASE: ICD-10-CM

## 2023-07-10 DIAGNOSIS — Z00.00 ANNUAL PHYSICAL EXAM: ICD-10-CM

## 2023-07-10 DIAGNOSIS — K51.80 OTHER ULCERATIVE COLITIS WITHOUT COMPLICATION: ICD-10-CM

## 2023-07-10 DIAGNOSIS — Z00.00 ANNUAL PHYSICAL EXAM: Primary | ICD-10-CM

## 2023-07-10 PROBLEM — E05.90 SUBCLINICAL HYPERTHYROIDISM: Status: RESOLVED | Noted: 2019-01-07 | Resolved: 2023-07-10

## 2023-07-10 LAB
ALBUMIN SERPL BCP-MCNC: 4 G/DL (ref 3.5–5.2)
ALP SERPL-CCNC: 54 U/L (ref 55–135)
ALT SERPL W/O P-5'-P-CCNC: 14 U/L (ref 10–44)
ANION GAP SERPL CALC-SCNC: 9 MMOL/L (ref 8–16)
AST SERPL-CCNC: 18 U/L (ref 10–40)
BASOPHILS # BLD AUTO: 0.04 K/UL (ref 0–0.2)
BASOPHILS NFR BLD: 0.7 % (ref 0–1.9)
BILIRUB SERPL-MCNC: 0.8 MG/DL (ref 0.1–1)
BUN SERPL-MCNC: 9 MG/DL (ref 6–20)
CALCIUM SERPL-MCNC: 9.5 MG/DL (ref 8.7–10.5)
CHLORIDE SERPL-SCNC: 108 MMOL/L (ref 95–110)
CHOLEST SERPL-MCNC: 136 MG/DL (ref 120–199)
CHOLEST/HDLC SERPL: 2.7 {RATIO} (ref 2–5)
CO2 SERPL-SCNC: 24 MMOL/L (ref 23–29)
CREAT SERPL-MCNC: 0.8 MG/DL (ref 0.5–1.4)
CRP SERPL-MCNC: 0.2 MG/L (ref 0–8.2)
DIFFERENTIAL METHOD: ABNORMAL
EOSINOPHIL # BLD AUTO: 0.1 K/UL (ref 0–0.5)
EOSINOPHIL NFR BLD: 1 % (ref 0–8)
ERYTHROCYTE [DISTWIDTH] IN BLOOD BY AUTOMATED COUNT: 13 % (ref 11.5–14.5)
ERYTHROCYTE [SEDIMENTATION RATE] IN BLOOD: 5 MM/HR (ref 0–20)
EST. GFR  (NO RACE VARIABLE): >60 ML/MIN/1.73 M^2
ESTIMATED AVG GLUCOSE: 97 MG/DL (ref 68–131)
GLUCOSE SERPL-MCNC: 95 MG/DL (ref 70–110)
HBA1C MFR BLD: 5 % (ref 4–5.6)
HCT VFR BLD AUTO: 39.7 % (ref 37–48.5)
HDLC SERPL-MCNC: 50 MG/DL (ref 40–75)
HDLC SERPL: 36.8 % (ref 20–50)
HGB BLD-MCNC: 12.9 G/DL (ref 12–16)
IMM GRANULOCYTES # BLD AUTO: 0.01 K/UL (ref 0–0.04)
IMM GRANULOCYTES NFR BLD AUTO: 0.2 % (ref 0–0.5)
LDLC SERPL CALC-MCNC: 74.6 MG/DL (ref 63–159)
LYMPHOCYTES # BLD AUTO: 3 K/UL (ref 1–4.8)
LYMPHOCYTES NFR BLD: 50.8 % (ref 18–48)
MCH RBC QN AUTO: 30.1 PG (ref 27–31)
MCHC RBC AUTO-ENTMCNC: 32.5 G/DL (ref 32–36)
MCV RBC AUTO: 93 FL (ref 82–98)
MONOCYTES # BLD AUTO: 0.4 K/UL (ref 0.3–1)
MONOCYTES NFR BLD: 6.8 % (ref 4–15)
NEUTROPHILS # BLD AUTO: 2.4 K/UL (ref 1.8–7.7)
NEUTROPHILS NFR BLD: 40.5 % (ref 38–73)
NONHDLC SERPL-MCNC: 86 MG/DL
NRBC BLD-RTO: 0 /100 WBC
PLATELET # BLD AUTO: 271 K/UL (ref 150–450)
PMV BLD AUTO: 11.5 FL (ref 9.2–12.9)
POTASSIUM SERPL-SCNC: 4.6 MMOL/L (ref 3.5–5.1)
PROT SERPL-MCNC: 7.3 G/DL (ref 6–8.4)
RBC # BLD AUTO: 4.28 M/UL (ref 4–5.4)
SODIUM SERPL-SCNC: 141 MMOL/L (ref 136–145)
T3 SERPL-MCNC: 86 NG/DL (ref 60–180)
T4 FREE SERPL-MCNC: 0.98 NG/DL (ref 0.71–1.51)
TRIGL SERPL-MCNC: 57 MG/DL (ref 30–150)
TSH SERPL DL<=0.005 MIU/L-ACNC: 0.48 UIU/ML (ref 0.4–4)
WBC # BLD AUTO: 5.85 K/UL (ref 3.9–12.7)

## 2023-07-10 PROCEDURE — 36415 COLL VENOUS BLD VENIPUNCTURE: CPT | Performed by: INTERNAL MEDICINE

## 2023-07-10 PROCEDURE — 3074F PR MOST RECENT SYSTOLIC BLOOD PRESSURE < 130 MM HG: ICD-10-PCS | Mod: CPTII,S$GLB,, | Performed by: INTERNAL MEDICINE

## 2023-07-10 PROCEDURE — 99999 PR PBB SHADOW E&M-EST. PATIENT-LVL IV: CPT | Mod: PBBFAC,,, | Performed by: INTERNAL MEDICINE

## 2023-07-10 PROCEDURE — 3008F PR BODY MASS INDEX (BMI) DOCUMENTED: ICD-10-PCS | Mod: CPTII,S$GLB,, | Performed by: INTERNAL MEDICINE

## 2023-07-10 PROCEDURE — 1160F PR REVIEW ALL MEDS BY PRESCRIBER/CLIN PHARMACIST DOCUMENTED: ICD-10-PCS | Mod: CPTII,S$GLB,, | Performed by: INTERNAL MEDICINE

## 2023-07-10 PROCEDURE — 3078F PR MOST RECENT DIASTOLIC BLOOD PRESSURE < 80 MM HG: ICD-10-PCS | Mod: CPTII,S$GLB,, | Performed by: INTERNAL MEDICINE

## 2023-07-10 PROCEDURE — 3008F BODY MASS INDEX DOCD: CPT | Mod: CPTII,S$GLB,, | Performed by: INTERNAL MEDICINE

## 2023-07-10 PROCEDURE — 99385 PREV VISIT NEW AGE 18-39: CPT | Mod: S$GLB,,, | Performed by: INTERNAL MEDICINE

## 2023-07-10 PROCEDURE — 83036 HEMOGLOBIN GLYCOSYLATED A1C: CPT | Performed by: INTERNAL MEDICINE

## 2023-07-10 PROCEDURE — 1159F PR MEDICATION LIST DOCUMENTED IN MEDICAL RECORD: ICD-10-PCS | Mod: CPTII,S$GLB,, | Performed by: INTERNAL MEDICINE

## 2023-07-10 PROCEDURE — 85025 COMPLETE CBC W/AUTO DIFF WBC: CPT | Performed by: INTERNAL MEDICINE

## 2023-07-10 PROCEDURE — 84443 ASSAY THYROID STIM HORMONE: CPT | Performed by: INTERNAL MEDICINE

## 2023-07-10 PROCEDURE — 84480 ASSAY TRIIODOTHYRONINE (T3): CPT | Performed by: INTERNAL MEDICINE

## 2023-07-10 PROCEDURE — 99385 PR PREVENTIVE VISIT,NEW,18-39: ICD-10-PCS | Mod: S$GLB,,, | Performed by: INTERNAL MEDICINE

## 2023-07-10 PROCEDURE — 80061 LIPID PANEL: CPT | Performed by: INTERNAL MEDICINE

## 2023-07-10 PROCEDURE — 1159F MED LIST DOCD IN RCRD: CPT | Mod: CPTII,S$GLB,, | Performed by: INTERNAL MEDICINE

## 2023-07-10 PROCEDURE — 80053 COMPREHEN METABOLIC PANEL: CPT | Performed by: INTERNAL MEDICINE

## 2023-07-10 PROCEDURE — 85651 RBC SED RATE NONAUTOMATED: CPT | Performed by: INTERNAL MEDICINE

## 2023-07-10 PROCEDURE — 3078F DIAST BP <80 MM HG: CPT | Mod: CPTII,S$GLB,, | Performed by: INTERNAL MEDICINE

## 2023-07-10 PROCEDURE — 99999 PR PBB SHADOW E&M-EST. PATIENT-LVL IV: ICD-10-PCS | Mod: PBBFAC,,, | Performed by: INTERNAL MEDICINE

## 2023-07-10 PROCEDURE — 1160F RVW MEDS BY RX/DR IN RCRD: CPT | Mod: CPTII,S$GLB,, | Performed by: INTERNAL MEDICINE

## 2023-07-10 PROCEDURE — 86140 C-REACTIVE PROTEIN: CPT | Performed by: INTERNAL MEDICINE

## 2023-07-10 PROCEDURE — 3074F SYST BP LT 130 MM HG: CPT | Mod: CPTII,S$GLB,, | Performed by: INTERNAL MEDICINE

## 2023-07-10 PROCEDURE — 84439 ASSAY OF FREE THYROXINE: CPT | Performed by: INTERNAL MEDICINE

## 2023-07-10 NOTE — PROGRESS NOTES
"Subjective:       Patient ID: Lela Garcia is a 33 y.o. female.    Chief Complaint: Annual Exam    Here for annual exam and to re-establish care      Itchy ears and eyes from allergies. Her diet modification has had significant benefits in her joint pains and GI symptoms.    ### UC ###  02/2023 Dx with UC by Dr riley at [facility] and started on mesalamine. Due for surveillance colonoscopy.      Migraines improved with Tx of UC.    #### Graves ####  -TSH <0.01 in 12/2018, on repeat free T4 was normal but free t3 elevated.  -Started methimazole since 12/2018. Dose adjusted over the years. Was on 10 mg/day, then 5 mg/day. Atenolol too initially.  -TRAB negative; Thyroglobulin antibody negative  -NM Thyroid scan: 2/2019. Elevated 24 hr uptake at 69%. Diffuse uptake consistent with Graves disease  -then TSH elevated so stopped methimazole since May/June 2020.                        Review of Systems   Constitutional:  Negative for chills, fatigue, fever and unexpected weight change.   HENT:  Negative for ear pain, hearing loss, postnasal drip, tinnitus, trouble swallowing and voice change.    Respiratory:  Negative for cough, chest tightness, shortness of breath and wheezing.    Cardiovascular:  Negative for chest pain, palpitations and leg swelling.   Gastrointestinal:  Negative for abdominal pain, blood in stool, diarrhea, nausea and vomiting.   Endocrine: Negative for polydipsia, polyphagia and polyuria.   Genitourinary:  Negative for difficulty urinating, dysuria, hematuria and vaginal bleeding.   Skin:  Negative for rash.   Allergic/Immunologic: Negative for food allergies.   Neurological:  Negative for dizziness, numbness and headaches.   Hematological:  Does not bruise/bleed easily.   Psychiatric/Behavioral:  The patient is not nervous/anxious.      Objective:      Vitals:    07/10/23 1308   BP: 118/72   Pulse: 66   SpO2: 98%   Weight: 57.2 kg (126 lb 1.7 oz)   Height: 5' 6" (1.676 m)      Physical " Exam  Vitals and nursing note reviewed.   Constitutional:       General: She is not in acute distress.     Appearance: Normal appearance. She is well-developed.   HENT:      Head: Normocephalic and atraumatic.      Mouth/Throat:      Pharynx: No oropharyngeal exudate.   Eyes:      General: No scleral icterus.     Conjunctiva/sclera: Conjunctivae normal.      Pupils: Pupils are equal, round, and reactive to light.   Neck:      Thyroid: No thyromegaly.   Cardiovascular:      Rate and Rhythm: Normal rate and regular rhythm.      Heart sounds: Normal heart sounds. No murmur heard.  Pulmonary:      Effort: Pulmonary effort is normal.      Breath sounds: Normal breath sounds. No wheezing or rales.   Abdominal:      General: There is no distension.   Musculoskeletal:         General: No tenderness.   Lymphadenopathy:      Cervical: No cervical adenopathy.   Skin:     General: Skin is warm and dry.   Neurological:      Mental Status: She is alert and oriented to person, place, and time.   Psychiatric:         Behavior: Behavior normal.       Assessment:       1. Annual physical exam    2. Graves disease    3. Other ulcerative colitis without complication        Plan:       Lela was seen today for annual exam.    Diagnoses and all orders for this visit:    Annual physical exam  -     CBC Auto Differential; Future  -     Comprehensive Metabolic Panel; Future  -     Hemoglobin A1C; Future  -     Lipid Panel; Future  -     TSH; Future    Graves disease  -     T3; Future  -     T4, FREE; Future    Other ulcerative colitis without complication  -     C-REACTIVE PROTEIN; Future  -     Sedimentation rate; Future           Elkin Villegas MD  Internal Medicine-[facility]        Side effects of medication(s) were discussed in detail and patient voiced understanding.  Patient will call back for any issues or complications.

## 2023-07-25 ENCOUNTER — TELEPHONE (OUTPATIENT)
Dept: INTERNAL MEDICINE | Facility: CLINIC | Age: 33
End: 2023-07-25
Payer: COMMERCIAL

## 2024-04-11 ENCOUNTER — TELEPHONE (OUTPATIENT)
Dept: INTERNAL MEDICINE | Facility: CLINIC | Age: 34
End: 2024-04-11
Payer: COMMERCIAL

## 2024-04-11 NOTE — TELEPHONE ENCOUNTER
Received message from patient requesting a letter to be excused from Jury Duty.  Letter is needed by Friday, 4/12/24. States she was diagnosed with ulcerative colitis and had a flare up in December, 2023.  Has been having off and on symptoms since then.  Patient also states that she was not able to get an earlier appt with her GI physician. GI will not write the letter because she must be seen first.

## 2024-04-11 NOTE — LETTER
April 11, 2024    Lela Garcia  [address] [facility] [place] [address]         [facility] Internal [facility]  [address] [facility] [facility] [place] [address]  Phone: [phone]  Fax: [phone] April 11, 2024     Patient: Lela Garcia   YOB: 1990   Date of Visit: 4/11/2024       To Whom It May Concern:    It is my medical opinion that Lela Garcia has been diagnosed with a chronic medical condition that requires frequent restroom visits and has the potential for severe, debilitating exacerbations that can last days to months.     If you have any questions or concerns, please don't hesitate to call [phone].    Sincerely,          Elkin Christianson MD

## 2024-04-24 ENCOUNTER — NURSE TRIAGE (OUTPATIENT)
Dept: ADMINISTRATIVE | Facility: CLINIC | Age: 34
End: 2024-04-24
Payer: COMMERCIAL

## 2024-04-24 NOTE — TELEPHONE ENCOUNTER
Spoke with pt and informed her that Elisabeth does not see UC pts and that her appt will be canceled. Offered pt visit with with MD pt states that she can not wait til June. Pt does not currently have insurance. Advised pt to contact previous GI provider. V/U

## 2024-04-24 NOTE — TELEPHONE ENCOUNTER
Spoke with pt who has Hx of ulcerative colitis. States she ran out of mesalamine, and has been having nausea. Vomiting,abdominal pain, and diarrhea. States she does have appointment tomorrow. Advised to be seen in ED. Verbalized understanding    Reason for Disposition   SEVERE abdominal pain (e.g., excruciating) and present > 1 hour    Additional Information   Negative: Shock suspected (e.g., cold/pale/clammy skin, too weak to stand, low BP, rapid pulse)   Negative: Difficult to awaken or acting confused (e.g., disoriented, slurred speech)   Negative: Sounds like a life-threatening emergency to the triager    Protocols used: Diarrhea-A-OH

## 2024-04-25 ENCOUNTER — LAB VISIT (OUTPATIENT)
Dept: LAB | Facility: HOSPITAL | Age: 34
End: 2024-04-25
Attending: STUDENT IN AN ORGANIZED HEALTH CARE EDUCATION/TRAINING PROGRAM
Payer: COMMERCIAL

## 2024-04-25 ENCOUNTER — OFFICE VISIT (OUTPATIENT)
Dept: GASTROENTEROLOGY | Facility: CLINIC | Age: 34
End: 2024-04-25
Payer: COMMERCIAL

## 2024-04-25 VITALS
WEIGHT: 117.31 LBS | BODY MASS INDEX: 18.85 KG/M2 | HEIGHT: 66 IN | HEART RATE: 120 BPM | DIASTOLIC BLOOD PRESSURE: 75 MMHG | SYSTOLIC BLOOD PRESSURE: 106 MMHG

## 2024-04-25 DIAGNOSIS — K51.319 ULCERATIVE RECTOSIGMOIDITIS WITH COMPLICATION: ICD-10-CM

## 2024-04-25 DIAGNOSIS — K82.4 GALLBLADDER POLYP: Primary | ICD-10-CM

## 2024-04-25 LAB
ALBUMIN SERPL BCP-MCNC: 3.5 G/DL (ref 3.5–5.2)
ALP SERPL-CCNC: 86 U/L (ref 55–135)
ALT SERPL W/O P-5'-P-CCNC: 10 U/L (ref 10–44)
ANION GAP SERPL CALC-SCNC: 11 MMOL/L (ref 8–16)
ANISOCYTOSIS BLD QL SMEAR: SLIGHT
AST SERPL-CCNC: 13 U/L (ref 10–40)
BASOPHILS # BLD AUTO: 0.06 K/UL (ref 0–0.2)
BASOPHILS NFR BLD: 0.6 % (ref 0–1.9)
BILIRUB SERPL-MCNC: 0.4 MG/DL (ref 0.1–1)
BUN SERPL-MCNC: 9 MG/DL (ref 6–20)
CALCIUM SERPL-MCNC: 9.3 MG/DL (ref 8.7–10.5)
CHLORIDE SERPL-SCNC: 101 MMOL/L (ref 95–110)
CO2 SERPL-SCNC: 22 MMOL/L (ref 23–29)
CREAT SERPL-MCNC: 0.9 MG/DL (ref 0.5–1.4)
CRP SERPL-MCNC: 23 MG/L (ref 0–8.2)
DACRYOCYTES BLD QL SMEAR: ABNORMAL
DIFFERENTIAL METHOD BLD: ABNORMAL
DOHLE BOD BLD QL SMEAR: PRESENT
EOSINOPHIL # BLD AUTO: 1.1 K/UL (ref 0–0.5)
EOSINOPHIL NFR BLD: 10.2 % (ref 0–8)
ERYTHROCYTE [DISTWIDTH] IN BLOOD BY AUTOMATED COUNT: 13 % (ref 11.5–14.5)
EST. GFR  (NO RACE VARIABLE): >60 ML/MIN/1.73 M^2
FERRITIN SERPL-MCNC: 45 NG/ML (ref 20–300)
GIANT PLATELETS BLD QL SMEAR: PRESENT
GLUCOSE SERPL-MCNC: 98 MG/DL (ref 70–110)
HCT VFR BLD AUTO: 42.3 % (ref 37–48.5)
HGB BLD-MCNC: 13.9 G/DL (ref 12–16)
HYPOCHROMIA BLD QL SMEAR: ABNORMAL
IMM GRANULOCYTES # BLD AUTO: 0.02 K/UL (ref 0–0.04)
IMM GRANULOCYTES NFR BLD AUTO: 0.2 % (ref 0–0.5)
IRON SERPL-MCNC: 17 UG/DL (ref 30–160)
LYMPHOCYTES # BLD AUTO: 3 K/UL (ref 1–4.8)
LYMPHOCYTES NFR BLD: 28.2 % (ref 18–48)
MCH RBC QN AUTO: 30 PG (ref 27–31)
MCHC RBC AUTO-ENTMCNC: 32.9 G/DL (ref 32–36)
MCV RBC AUTO: 91 FL (ref 82–98)
MONOCYTES # BLD AUTO: 1.3 K/UL (ref 0.3–1)
MONOCYTES NFR BLD: 12 % (ref 4–15)
NEUTROPHILS # BLD AUTO: 5.2 K/UL (ref 1.8–7.7)
NEUTROPHILS NFR BLD: 48.8 % (ref 38–73)
NRBC BLD-RTO: 0 /100 WBC
OVALOCYTES BLD QL SMEAR: ABNORMAL
PLATELET # BLD AUTO: 499 K/UL (ref 150–450)
PLATELET BLD QL SMEAR: ABNORMAL
PMV BLD AUTO: 10.6 FL (ref 9.2–12.9)
POIKILOCYTOSIS BLD QL SMEAR: SLIGHT
POLYCHROMASIA BLD QL SMEAR: ABNORMAL
POTASSIUM SERPL-SCNC: 3.5 MMOL/L (ref 3.5–5.1)
PROT SERPL-MCNC: 7.9 G/DL (ref 6–8.4)
RBC # BLD AUTO: 4.63 M/UL (ref 4–5.4)
SATURATED IRON: 4 % (ref 20–50)
SCHISTOCYTES BLD QL SMEAR: ABNORMAL
SMUDGE CELLS BLD QL SMEAR: PRESENT
SODIUM SERPL-SCNC: 134 MMOL/L (ref 136–145)
SPHEROCYTES BLD QL SMEAR: ABNORMAL
TOTAL IRON BINDING CAPACITY: 380 UG/DL (ref 250–450)
TOXIC GRANULES BLD QL SMEAR: PRESENT
TRANSFERRIN SERPL-MCNC: 257 MG/DL (ref 200–375)
WBC # BLD AUTO: 10.69 K/UL (ref 3.9–12.7)

## 2024-04-25 PROCEDURE — 99999 PR PBB SHADOW E&M-EST. PATIENT-LVL III: CPT | Mod: PBBFAC,,, | Performed by: STUDENT IN AN ORGANIZED HEALTH CARE EDUCATION/TRAINING PROGRAM

## 2024-04-25 PROCEDURE — 83540 ASSAY OF IRON: CPT | Performed by: STUDENT IN AN ORGANIZED HEALTH CARE EDUCATION/TRAINING PROGRAM

## 2024-04-25 PROCEDURE — 85025 COMPLETE CBC W/AUTO DIFF WBC: CPT | Performed by: STUDENT IN AN ORGANIZED HEALTH CARE EDUCATION/TRAINING PROGRAM

## 2024-04-25 PROCEDURE — 1159F MED LIST DOCD IN RCRD: CPT | Mod: CPTII,,, | Performed by: STUDENT IN AN ORGANIZED HEALTH CARE EDUCATION/TRAINING PROGRAM

## 2024-04-25 PROCEDURE — 3074F SYST BP LT 130 MM HG: CPT | Mod: CPTII,,, | Performed by: STUDENT IN AN ORGANIZED HEALTH CARE EDUCATION/TRAINING PROGRAM

## 2024-04-25 PROCEDURE — 3008F BODY MASS INDEX DOCD: CPT | Mod: CPTII,,, | Performed by: STUDENT IN AN ORGANIZED HEALTH CARE EDUCATION/TRAINING PROGRAM

## 2024-04-25 PROCEDURE — 3078F DIAST BP <80 MM HG: CPT | Mod: CPTII,,, | Performed by: STUDENT IN AN ORGANIZED HEALTH CARE EDUCATION/TRAINING PROGRAM

## 2024-04-25 PROCEDURE — 80053 COMPREHEN METABOLIC PANEL: CPT | Performed by: STUDENT IN AN ORGANIZED HEALTH CARE EDUCATION/TRAINING PROGRAM

## 2024-04-25 PROCEDURE — 82728 ASSAY OF FERRITIN: CPT | Performed by: STUDENT IN AN ORGANIZED HEALTH CARE EDUCATION/TRAINING PROGRAM

## 2024-04-25 PROCEDURE — 86140 C-REACTIVE PROTEIN: CPT | Performed by: STUDENT IN AN ORGANIZED HEALTH CARE EDUCATION/TRAINING PROGRAM

## 2024-04-25 PROCEDURE — 99213 OFFICE O/P EST LOW 20 MIN: CPT | Mod: PBBFAC | Performed by: STUDENT IN AN ORGANIZED HEALTH CARE EDUCATION/TRAINING PROGRAM

## 2024-04-25 PROCEDURE — 36415 COLL VENOUS BLD VENIPUNCTURE: CPT | Performed by: STUDENT IN AN ORGANIZED HEALTH CARE EDUCATION/TRAINING PROGRAM

## 2024-04-25 PROCEDURE — 99214 OFFICE O/P EST MOD 30 MIN: CPT | Mod: S$GLB,,, | Performed by: STUDENT IN AN ORGANIZED HEALTH CARE EDUCATION/TRAINING PROGRAM

## 2024-04-25 RX ORDER — MESALAMINE 1.2 G/1
2.4 TABLET, DELAYED RELEASE ORAL
Qty: 60 TABLET | Refills: 11 | Status: SHIPPED | OUTPATIENT
Start: 2024-04-25 | End: 2024-05-31

## 2024-04-25 NOTE — PROGRESS NOTES
"    [facility] follow up Note    Reason for Consult:  UC     PCP:   Elkin Christianson       Referring MD:  No referring provider defined for this encounter.    HPI:  This is a 33 y.o. female previously seen by Dr Fowler in 2019 for blood in stool and change in bowel habits. She also has a history of H pylori gastritis which was treated with negative test of cure through [facility] (per last note). EGD and cscope were ordered at 2019 visit but were not scheduled due to insurance costs. US in 2018 showed gallbladder polyp. She then went to see [facility] and had an EGD and cscope in 2022 inflammation concerning for UC in the sigmoid and rectum. Otherwise colon appeared normal. EGD at that time was also normal. She was started on lialda with good improvement in symptoms for a while. I do not have the path results for that cscope. She took lialda for 1 year and then as she was doing very well and had incorporated some dietary changes, she stopped the medication. She continued to do well for about 6 months. Then this fall, she had more constipation which she would take a gummy stool softener. She was having 1-2 BM daily which were normal, formed. Then in December 2023, [place] went [place] and developed watery diarrhea. Then she started to have blood in stool and mucoid stool. Once she returned home, she got back on lialda. Symptoms improved for short time but overall she she has continued to have abdominal cramping, urgency, frequency. She has not had a normal, formed stool since that time. Has > 5 BM per day with blood. Worsening abdominal pain. More fatigue. She has some lower back pain. No other joint issues, rashes, eye pain. Denied NSAID use. No Fh of CRC, gastric cancer, celiac.     Objective Findings:    Vital Signs:  /75   Pulse (!) 120   Ht 5' 6" (1.676 m)   Wt 53.2 kg (117 lb 4.6 oz)   BMI 18.93 kg/m²   Body mass index is 18.93 kg/m².    Physical Exam:  General " Appearance: Well appearing in no acute distress  Head:   Normocephalic, without obvious abnormality  Eyes:    No scleral icterus    Lungs: CTA bilaterally in anterior and posterior fields   Heart:  Regular rate and rhythm, no murmurs heard  Abdomen: Soft, non tender, non distended with positive bowel sounds in all four quadrants.      Imaging:  US 2018       IMPRESSION:      3 mm gallbladder polyp.       Endoscopy:    Reviewed in chart     Assessment:    Ulcerative colitis, left sided     Patient with history of UC diagnosed in 2022 and was on lialda. Initially doing well and took this for 1 year before stopping. Unfortunately she started to have bloody diarrhea, abdominal pain, urgency which did not improve once she resumed lialda. Check stool studies (calpro and giardia due to recent travel). Blood work. Repeat Cscope. Will also order US due to GB polyp in past.        Recommendations:    - Diagnosed in 2022, left side disease   - Treatment with lialda, no steroids in past, no hospitalizations, no biologics   - Calpro, culture, Cdiff, Giardia due to persistent watery stools since DR trip  - CBC, CMP, CRP, Iron panel with ferritin   - Repeat cscope with biopsies   - Further plan based on results, will have her plan to follow up in IBD clinic after cscope   - US Ab due to history of gallbladder polyp      RTC after endoscopy       Order summary:  Orders Placed This Encounter    CLOSTRIDIUM DIFFICILE    CULTURE, STOOL    US Abdomen Complete    Calprotectin, Stool    C-reactive protein    CBC Auto Differential    COMPREHENSIVE METABOLIC PANEL    IRON AND TIBC    FERRITIN    Giardia / Cryptosporidum, EIA    mesalamine (LIALDA) 1.2 gram TbEC         Thank you so much for allowing me to participate in the care of Lela Michelle MD  Gastroenterology   [facility]

## 2024-04-26 ENCOUNTER — TELEPHONE (OUTPATIENT)
Dept: ENDOSCOPY | Facility: HOSPITAL | Age: 34
End: 2024-04-26
Payer: COMMERCIAL

## 2024-04-26 DIAGNOSIS — A04.72 C. DIFFICILE COLITIS: Primary | ICD-10-CM

## 2024-04-26 RX ORDER — VANCOMYCIN HYDROCHLORIDE 125 MG/1
125 CAPSULE ORAL EVERY 6 HOURS
Qty: 56 CAPSULE | Refills: 0 | Status: SHIPPED | OUTPATIENT
Start: 2024-04-26 | End: 2024-05-10

## 2024-04-26 NOTE — TELEPHONE ENCOUNTER
"   Message  Received: Today  Cathy Michelle MD Piglia, Ashley, RN  Caller: Unspecified (Today,  9:15 AM)  Hey I ordered cscope yesterday on this patient but her blood work came back RADHA, would like to add EGD    Procedure: EGD    Diagnosis: Iron deficiency anemia    Procedure Timing: Within 4 weeks (Urgent)    *If within 4 weeks selected, please amanda as high priority*    *If greater than 12 weeks, please select "5-12 weeks" and delay sending until 3 months prior to requested date*    Provider: Myself    Location: [facility]    Additional Scheduling Information: No scheduling concerns    Prep Specifications:N/A    Is the patient taking a GLP-1 Agonist:no    Have you attached a patient to this message: yes  "

## 2024-04-26 NOTE — TELEPHONE ENCOUNTER
"   Message  Received: Yesterday  Cathy Michelle MD Piglia, Ashley, RN  Caller: Unspecified (Yesterday, 12:49 PM)  Procedure: Colonoscopy    Diagnosis: Rectal bleeding and Diarrhea    Procedure Timing: Within 4 weeks (Urgent)    *If within 4 weeks selected, please amanda as high priority*    *If greater than 12 weeks, please select "5-12 weeks" and delay sending until 3 months prior to requested date*    Provider: Myself    Location: [facility]    Additional Scheduling Information: No scheduling concerns    Prep Specifications:Standard prep    Is the patient taking a GLP-1 Agonist:no    Have you attached a patient to this message: yes  "

## 2024-05-01 ENCOUNTER — TELEPHONE (OUTPATIENT)
Dept: ENDOSCOPY | Facility: HOSPITAL | Age: 34
End: 2024-05-01
Payer: COMMERCIAL

## 2024-05-01 DIAGNOSIS — K62.5 RECTAL BLEEDING: ICD-10-CM

## 2024-05-01 DIAGNOSIS — D50.9 IRON DEFICIENCY ANEMIA, UNSPECIFIED IRON DEFICIENCY ANEMIA TYPE: Primary | ICD-10-CM

## 2024-05-01 DIAGNOSIS — R19.7 DIARRHEA, UNSPECIFIED TYPE: ICD-10-CM

## 2024-05-01 RX ORDER — SOD SULF/POT CHLORIDE/MAG SULF 1.479 G
12 TABLET ORAL DAILY
Qty: 24 TABLET | Refills: 0 | Status: SHIPPED | OUTPATIENT
Start: 2024-05-01 | End: 2024-06-19

## 2024-05-01 NOTE — TELEPHONE ENCOUNTER
Spoke to patient to schedule procedure(s) Colonoscopy/EGD       Physician to perform procedure(s) Dr. FRANCISCO JAVIER Michelle  Date of Procedure (s) 6/21/2024  Arrival Time 1:15 PM  Time of Procedure(s) 2:15 PM   Location of Procedure(s) [place] 2nd Floor  Type of Rx Prep sent to patient: Sutab  Instructions provided to [facility] via [facility]    Patient was informed on the following information and verbalized understanding. Screening questionnaire reviewed with patient and complete. If procedure requires anesthesia, a responsible adult needs to be present to accompany the patient home, patient cannot drive after receiving anesthesia. Appointment details are tentative, especially check-in time. Patient will receive a prep-op call 7 days prior to confirm check-in time for procedure. If applicable the patient should contact their pharmacy to verify Rx for procedure prep is ready for pick-up. Patient was advised to call the scheduling department at [phone] if pharmacy states no Rx is available. Patient was advised to call the endoscopy scheduling department if any questions or concerns arise.      [facility] Scheduling Department

## 2024-05-06 ENCOUNTER — PATIENT MESSAGE (OUTPATIENT)
Dept: GASTROENTEROLOGY | Facility: CLINIC | Age: 34
End: 2024-05-06
Payer: COMMERCIAL

## 2024-05-06 NOTE — TELEPHONE ENCOUNTER
MA spoke to patient, and explained situation, patient agreed to collect new sample for testing, she will  test kit today.

## 2024-05-07 ENCOUNTER — LAB VISIT (OUTPATIENT)
Dept: LAB | Facility: HOSPITAL | Age: 34
End: 2024-05-07
Attending: STUDENT IN AN ORGANIZED HEALTH CARE EDUCATION/TRAINING PROGRAM
Payer: COMMERCIAL

## 2024-05-07 DIAGNOSIS — K51.319 ULCERATIVE RECTOSIGMOIDITIS WITH COMPLICATION: ICD-10-CM

## 2024-05-07 PROCEDURE — 87329 GIARDIA AG IA: CPT | Performed by: STUDENT IN AN ORGANIZED HEALTH CARE EDUCATION/TRAINING PROGRAM

## 2024-05-07 PROCEDURE — 83993 ASSAY FOR CALPROTECTIN FECAL: CPT | Performed by: STUDENT IN AN ORGANIZED HEALTH CARE EDUCATION/TRAINING PROGRAM

## 2024-05-08 LAB
CRYPTOSP AG STL QL IA: NEGATIVE
G LAMBLIA AG STL QL IA: NEGATIVE

## 2024-05-10 LAB — CALPROTECTIN STL-MCNT: ABNORMAL MCG/G

## 2024-05-19 ENCOUNTER — PATIENT MESSAGE (OUTPATIENT)
Dept: GASTROENTEROLOGY | Facility: CLINIC | Age: 34
End: 2024-05-19
Payer: COMMERCIAL

## 2024-05-20 ENCOUNTER — TELEPHONE (OUTPATIENT)
Dept: GASTROENTEROLOGY | Facility: CLINIC | Age: 34
End: 2024-05-20
Payer: COMMERCIAL

## 2024-05-20 NOTE — TELEPHONE ENCOUNTER
----- Message from Emelina Hernández sent at 5/20/2024  2:51 PM CDT -----  Regarding: Endo/Colonoscopy  Contact: pt @ [phone]  Pt is calling to get a sooner appt for colonoscopy/Endoscopy. Asking for a call back

## 2024-05-21 ENCOUNTER — TELEPHONE (OUTPATIENT)
Dept: ENDOSCOPY | Facility: HOSPITAL | Age: 34
End: 2024-05-21
Payer: COMMERCIAL

## 2024-05-21 VITALS — BODY MASS INDEX: 18.8 KG/M2 | WEIGHT: 117 LBS | HEIGHT: 66 IN

## 2024-05-21 DIAGNOSIS — D50.9 IRON DEFICIENCY ANEMIA, UNSPECIFIED IRON DEFICIENCY ANEMIA TYPE: Primary | ICD-10-CM

## 2024-05-21 NOTE — TELEPHONE ENCOUNTER
Spoke to patient to schedule procedure(s) Upper Endoscopy (EGD)       Physician to perform procedure(s) Dr. CHANTE Gracia  Date of Procedure (s) 5/28/24  Arrival Time 10:30 AM  Time of Procedure(s) 11:30 AM   Location of Procedure(s) [place] [address] Floor  Type of Rx Prep sent to patient: N/A  Instructions provided to [facility] via [facility]    Patient was informed on the following information and verbalized understanding. Screening questionnaire reviewed with patient and complete. If procedure requires anesthesia, a responsible adult needs to be present to accompany the patient home, patient cannot drive after receiving anesthesia. Appointment details are tentative, especially check-in time. Patient will receive a prep-op call 7 days prior to confirm check-in time for procedure. If applicable the patient should contact their pharmacy to verify Rx for procedure prep is ready for pick-up. Patient was advised to call the scheduling department at [phone] if pharmacy states no Rx is available. Patient was advised to call the endoscopy scheduling department if any questions or concerns arise.      [facility] Scheduling Department

## 2024-05-21 NOTE — TELEPHONE ENCOUNTER
Lela Garcia MRN [id number] Ph # [phone] Wants call because she doesn't know time of scope on 5/24.     Pt requested to have both procedure done on two different days

## 2024-05-21 NOTE — TELEPHONE ENCOUNTER
Spoke to patient to schedule procedure(s) Colonoscopy       Physician to perform procedure(s) Dr. YVES Renee  Date of Procedure (s) 5/27/24  Arrival Time 2:15 PM  Time of Procedure(s) 3:15 PM   Location of Procedure(s) [place] [address] Floor  Type of Rx Prep sent to patient: Sutab  Instructions provided to [facility] via [facility]    Patient was informed on the following information and verbalized understanding. Screening questionnaire reviewed with patient and complete. If procedure requires anesthesia, a responsible adult needs to be present to accompany the patient home, patient cannot drive after receiving anesthesia. Appointment details are tentative, especially check-in time. Patient will receive a prep-op call 7 days prior to confirm check-in time for procedure. If applicable the patient should contact their pharmacy to verify Rx for procedure prep is ready for pick-up. Patient was advised to call the scheduling department at [phone] if pharmacy states no Rx is available. Patient was advised to call the endoscopy scheduling department if any questions or concerns arise.      [facility] Scheduling Department

## 2024-05-21 NOTE — TELEPHONE ENCOUNTER
Received Message Lela Garcia MRN [id number]  # [phone] Wants call because she doesn't know time of scope on 5/24.

## 2024-05-21 NOTE — TELEPHONE ENCOUNTER
FW: Endo/Colonoscopy  Received: Today  Miley Paulson Lindsay  Caller: pt @ [phone] (Yesterday,  2:51 PM)         Previous Messages       ----- Message -----  From: Hiro Cordova MA  Sent: 5/20/2024   3:19 PM CDT  To: [facility] Endo Schedulers  Subject: FW: Endo/Colonoscopy                            Hello, patient is calling to see if it is possible to get sooner date of procedure.  ----- Message -----  From: Emelina Hernández  Sent: 5/20/2024   2:52 PM CDT  To: [facility]  Subject: Endo/Colonoscopy                                Pt is calling to get a sooner appt for colonoscopy/Endoscopy. Asking for a call back          Spoke to pt to reschedule procedure(s) Colonoscopy/EGD       Physician to perform procedure(s) Dr. CHANTE Garcia  Date of Procedure (s) 5/24/24  Arrival Time 1:45 PM  Time of Procedure(s) 2:45 PM   Location of Procedure(s) [place] 2[address] Floor  Type of Rx Prep sent to patient: Sutab  Instructions provided to patient via [facility]    Patient was informed on the following information and verbalized understanding. Screening questionnaire reviewed with patient and complete. If procedure requires anesthesia, a responsible adult needs to be present to accompany the patient home, patient cannot drive after receiving anesthesia. Appointment details are tentative, especially check-in time. Patient will receive a prep-op call 7 days prior to confirm check-in time for procedure. If applicable the patient should contact their pharmacy to verify Rx for procedure prep is ready for pick-up. Patient was advised to call the scheduling department at [phone] if pharmacy states no Rx is available. Patient was advised to call the endoscopy scheduling department if any questions or concerns arise.       Endoscopy Scheduling Department

## 2024-05-27 ENCOUNTER — HOSPITAL ENCOUNTER (OUTPATIENT)
Facility: HOSPITAL | Age: 34
Discharge: HOME | End: 2024-05-27
Attending: INTERNAL MEDICINE | Admitting: INTERNAL MEDICINE
Payer: COMMERCIAL

## 2024-05-27 ENCOUNTER — ANESTHESIA EVENT (OUTPATIENT)
Dept: ENDOSCOPY | Facility: HOSPITAL | Age: 34
End: 2024-05-27
Payer: COMMERCIAL

## 2024-05-27 ENCOUNTER — ANESTHESIA (OUTPATIENT)
Dept: ENDOSCOPY | Facility: HOSPITAL | Age: 34
End: 2024-05-27
Payer: COMMERCIAL

## 2024-05-27 VITALS
TEMPERATURE: 98 F | RESPIRATION RATE: 18 BRPM | WEIGHT: 122 LBS | HEIGHT: 66 IN | HEART RATE: 63 BPM | OXYGEN SATURATION: 100 % | BODY MASS INDEX: 19.61 KG/M2 | DIASTOLIC BLOOD PRESSURE: 77 MMHG | SYSTOLIC BLOOD PRESSURE: 118 MMHG

## 2024-05-27 LAB
B-HCG UR QL: NEGATIVE
CTP QC/QA: YES

## 2024-05-27 PROCEDURE — 27201012 HC FORCEPS, HOT/COLD, DISP: Performed by: INTERNAL MEDICINE

## 2024-05-27 PROCEDURE — 43235 EGD DIAGNOSTIC BRUSH WASH: CPT | Mod: 51,,, | Performed by: INTERNAL MEDICINE

## 2024-05-27 PROCEDURE — E9220 PRA ENDO ANESTHESIA: HCPCS | Mod: ,,, | Performed by: NURSE ANESTHETIST, CERTIFIED REGISTERED

## 2024-05-27 PROCEDURE — 37000008 HC ANESTHESIA 1ST 15 MINUTES: Performed by: INTERNAL MEDICINE

## 2024-05-27 PROCEDURE — 81025 URINE PREGNANCY TEST: CPT | Performed by: INTERNAL MEDICINE

## 2024-05-27 PROCEDURE — 25000003 PHARM REV CODE 250: Performed by: INTERNAL MEDICINE

## 2024-05-27 PROCEDURE — 25000003 PHARM REV CODE 250: Performed by: NURSE ANESTHETIST, CERTIFIED REGISTERED

## 2024-05-27 PROCEDURE — 88305 TISSUE EXAM BY PATHOLOGIST: CPT | Performed by: PATHOLOGY

## 2024-05-27 PROCEDURE — 37000009 HC ANESTHESIA EA ADD 15 MINS: Performed by: INTERNAL MEDICINE

## 2024-05-27 PROCEDURE — 63600175 PHARM REV CODE 636 W HCPCS: Performed by: NURSE ANESTHETIST, CERTIFIED REGISTERED

## 2024-05-27 PROCEDURE — 45380 COLONOSCOPY AND BIOPSY: CPT | Mod: ,,, | Performed by: INTERNAL MEDICINE

## 2024-05-27 PROCEDURE — 88305 TISSUE EXAM BY PATHOLOGIST: CPT | Mod: 26,,, | Performed by: PATHOLOGY

## 2024-05-27 PROCEDURE — 45380 COLONOSCOPY AND BIOPSY: CPT | Performed by: INTERNAL MEDICINE

## 2024-05-27 PROCEDURE — 43235 EGD DIAGNOSTIC BRUSH WASH: CPT | Performed by: INTERNAL MEDICINE

## 2024-05-27 RX ORDER — PROPOFOL 10 MG/ML
VIAL (ML) INTRAVENOUS
Status: DISCONTINUED | OUTPATIENT
Start: 2024-05-27 | End: 2024-05-27

## 2024-05-27 RX ORDER — SODIUM CHLORIDE 9 MG/ML
INJECTION, SOLUTION INTRAVENOUS CONTINUOUS
Status: DISCONTINUED | OUTPATIENT
Start: 2024-05-27 | End: 2024-05-27 | Stop reason: HOSPADM

## 2024-05-27 RX ORDER — PROPOFOL 10 MG/ML
VIAL (ML) INTRAVENOUS CONTINUOUS PRN
Status: DISCONTINUED | OUTPATIENT
Start: 2024-05-27 | End: 2024-05-27

## 2024-05-27 RX ORDER — LIDOCAINE HYDROCHLORIDE 20 MG/ML
INJECTION INTRAVENOUS
Status: DISCONTINUED | OUTPATIENT
Start: 2024-05-27 | End: 2024-05-27

## 2024-05-27 RX ADMIN — Medication 50 MG: at 02:05

## 2024-05-27 RX ADMIN — Medication 100 MG: at 02:05

## 2024-05-27 RX ADMIN — PROPOFOL 200 MCG/KG/MIN: 10 INJECTION, EMULSION INTRAVENOUS at 02:05

## 2024-05-27 RX ADMIN — SODIUM CHLORIDE: 0.9 INJECTION, SOLUTION INTRAVENOUS at 02:05

## 2024-05-27 RX ADMIN — LIDOCAINE HYDROCHLORIDE 100 MG: 20 INJECTION INTRAVENOUS at 02:05

## 2024-05-27 NOTE — TRANSFER OF CARE
"Anesthesia Transfer of Care Note    Patient: Lela Garcia    Procedure(s) Performed: Procedure(s) (LRB):  COLONOSCOPY (N/A)  EGD (ESOPHAGOGASTRODUODENOSCOPY) (N/A)    Patient location: PACU    Anesthesia Type: general    Transport from OR: Transported from OR on 2-3 L/min O2 by NC with adequate spontaneous ventilation    Post pain: adequate analgesia    Post assessment: no apparent anesthetic complications    Post vital signs: stable    Level of consciousness: responds to stimulation    Nausea/Vomiting: no nausea/vomiting    Complications: none    Transfer of care protocol was followed      Last vitals: Visit Vitals  /70 (BP Location: Left arm, Patient Position: Lying)   Pulse 92   Temp 37 °C (98.6 °F) (Temporal)   Resp 16   Ht 5' 6" (1.676 m)   Wt 55.3 kg (122 lb)   SpO2 100%   Breastfeeding No   BMI 19.69 kg/m²     "

## 2024-05-27 NOTE — ANESTHESIA POSTPROCEDURE EVALUATION
Anesthesia Post Evaluation    Patient: Lela Garcia    Procedure(s) Performed: Procedure(s) (LRB):  COLONOSCOPY (N/A)  EGD (ESOPHAGOGASTRODUODENOSCOPY) (N/A)    Final Anesthesia Type: general      Level of consciousness: awake and alert  Post-procedure vital signs: reviewed and stable  Pain control: Pain has been treated.  Airway patency: patent    PONV status: Absent or treated.  Anesthetic complications: no      Cardiovascular status: hemodynamically stable  Respiratory status: unassisted  Hydration status: euvolemic                Vitals Value Taken Time   /77 05/27/24 1537   Temp 36.8 °C (98.2 °F) 05/27/24 1511   Pulse 63 05/27/24 1537   Resp 18 05/27/24 1537   SpO2 100 % 05/27/24 1537         No case tracking events are documented in the log.      Pain/Apolonia Score: Apolonia Score: 10 (5/27/2024  3:11 PM)

## 2024-05-27 NOTE — ANESTHESIA PREPROCEDURE EVALUATION
05/27/2024  Lela Garcia is a 34 y.o., female.      Pre-op Assessment    I have reviewed the Patient Summary Reports.       I have reviewed the Medications.     Review of Systems  Anesthesia Hx:             Denies Family Hx of Anesthesia complications.    Denies Personal Hx of Anesthesia complications.                    Hematology/Oncology:  Hematology Normal   Oncology Normal                                   EENT/Dental:  EENT/Dental Normal           Cardiovascular:  Cardiovascular Normal                                            Pulmonary:  Pulmonary Normal                       Renal/:  Renal/ Normal                 Hepatic/GI:  Hepatic/GI Normal Bowel Prep.      Ulcerative colitis          Musculoskeletal:  Musculoskeletal Normal                Neurological:  Neurology Normal                                      Endocrine:  Endocrine Normal            Dermatological:  Skin Normal    Psych:  Psychiatric Normal                    Physical Exam  General: Well nourished, Alert and Oriented    Airway:  Mallampati: I     Dental:  Intact        Anesthesia Plan  Type of Anesthesia, risks & benefits discussed:    Anesthesia Type: Gen Natural Airway  Intra-op Monitoring Plan: Standard ASA Monitors  Post Op Pain Control Plan: multimodal analgesia  Induction:  IV  Informed Consent: Informed consent signed with the Patient and all parties understand the risks and agree with anesthesia plan.  All questions answered.   ASA Score: 2  Day of Surgery Review of History & Physical: H&P Update referred to the surgeon/provider.    Ready For Surgery From Anesthesia Perspective.     .

## 2024-05-27 NOTE — PROVATION PATIENT INSTRUCTIONS
Discharge Summary/Instructions after an Endoscopic Procedure  Patient Name: Lela Garcia  Patient MRN: [id number]  Patient YOB: 1990  Monday, May 27, 2024  John Renee MD  Dear patient,  As a result of recent federal legislation (The Federal Cures Act), you may   receive lab or pathology results from your procedure in your [facility]   account before your physician is able to contact you. Your physician or   their representative will relay the results to you with their   recommendations at their soonest availability.  Thank you,  RESTRICTIONS:  During your procedure today, you received medications for sedation.  These   medications may affect your judgment, balance and coordination.  Therefore,   for 24 hours, you have the following restrictions:   - DO NOT drive a car, operate machinery, make legal/financial decisions,   sign important papers or drink alcohol.    ACTIVITY:  Today: no heavy lifting, straining or running due to procedural   sedation/anesthesia.  The following day: return to full activity including work.  DIET:  Eat and drink normally unless instructed otherwise.     TREATMENT FOR COMMON SIDE EFFECTS:  - Mild abdominal pain, nausea, belching, bloating or excessive gas:  rest,   eat lightly and use a heating pad.  - Sore Throat: treat with throat lozenges and/or gargle with warm salt   water.  - Because air was used during the procedure, expelling large amounts of air   from your rectum or belching is normal.  - If a bowel prep was taken, you may not have a bowel movement for 1-3 days.    This is normal.  SYMPTOMS TO WATCH FOR AND REPORT TO YOUR PHYSICIAN:  1. Abdominal pain or bloating, other than gas cramps.  2. Chest pain.  3. Back pain.  4. Signs of infection such as: chills or fever occurring within 24 hours   after the procedure.  5. Rectal bleeding, which would show as bright red, maroon, or black stools.   (A tablespoon of blood from the rectum is not serious, especially if    hemorrhoids are present.)  6. Vomiting.  7. Weakness or dizziness.  GO DIRECTLY TO THE NEAREST EMERGENCY ROOM IF YOU HAVE ANY OF THE FOLLOWING:      Difficulty breathing              Chills and/or fever over 101 F   Persistent vomiting and/or vomiting blood   Severe abdominal pain   Severe chest pain   Black, tarry stools   Bleeding- more than one tablespoon   Any other symptom or condition that you feel may need urgent attention  Your doctor recommends these additional instructions:  If any biopsies were taken, your doctors clinic will contact you in 1 to 2   weeks with any results.  - Discharge patient to home.   - Resume previous diet.   - Continue present medications.   - Await pathology results.   - Return to referring physician as previously scheduled.   - Repeat colonoscopy (date not yet determined) for surveillance.  For questions, problems or results please call your physician - John Renee MD at Work:  [phone].  [facility], EMERGENCY ROOM PHONE NUMBER: [phone]  IF A COMPLICATION OR EMERGENCY SITUATION ARISES AND YOU ARE UNABLE TO REACH   YOUR PHYSICIAN - GO DIRECTLY TO THE EMERGENCY ROOM.  John Renee MD  5/27/2024 3:13:58 PM  This report has been verified and signed electronically.  Dear patient,  As a result of recent federal legislation (The Federal Cures Act), you may   receive lab or pathology results from your procedure in your [facility]   account before your physician is able to contact you. Your physician or   their representative will relay the results to you with their   recommendations at their soonest availability.  Thank you,  PROVATION

## 2024-05-27 NOTE — H&P
Short Stay Endoscopy History and Physical    PCP - Elkin Christianson MD  Referring Physician - PRE-ADMIT, ENDO -[facility]  No address on file    Procedure - egd/colon  ASA - per anesthesia  Mallampati - per anesthesia  History of Anesthesia problems - no  Family history Anesthesia problems -  no   Plan of anesthesia - General    HPI:  This is a 34 y.o. female here for evaluation of: anemia    Reflux - no  Dysphagia - no  Abdominal pain - no  Diarrhea - no    ROS:  Constitutional: No fevers, chills, No weight loss  CV: No chest pain  Pulm: No cough, No shortness of breath  Ophtho: No vision changes  GI: see HPI  Derm: No rash    Medical History:  has a past medical history of Seasonal allergies.    Surgical History:  has a past surgical history that includes Pelvic laparoscopy.    Family History: family history includes Breast cancer in her paternal grandmother; Diabetes in her mother and paternal grandmother; Glaucoma in her mother and paternal grandmother; Heart disease in her maternal grandfather and paternal grandmother; Hypertension in her maternal grandmother, mother, and paternal grandmother; Parkinsonism in her father..    Social History:  reports that she has never smoked. She has never used smokeless tobacco. She reports that she does not currently use alcohol. She reports current drug use. Drug: Marijuana.    Review of patient's allergies indicates:   Allergen Reactions    Iodine and iodide containing products Shortness Of Breath    Shellfish containing products Other (See Comments)     Low BP    Allergen ext-venom-honey bee     Belton     Cherries     Cocoa     Egg derived     Flowers     Garlic     Grass pollen-shawna grass standard     Grass pollen-orchardgrass, standard     Grass pollen-red top, standard     Grass pollen-sweet vernal grass, std.     Hornet venom     Milk containing products (dairy)     Oats (sudhakar)     Orange     Pineapple     Soy     Tree pollen-sharon     Wasp venom      Watermelon     Weed pollen     Wheat containing prod     Yellow jacket venom        Medications:   Medications Prior to Admission   Medication Sig Dispense Refill Last Dose    medroxyprogesterone (DEPO-SUBQ PROVERA) 104 mg/0.65 mL injection Inject 0.65 mL (104 mg total) into the skin every 3 (three) months. 2.6 mL 3 Past Month    mesalamine (LIALDA) 1.2 gram TbEC Take 2 tablets (2.4 g total) by mouth daily with breakfast. 60 tablet 11 Past Week    cetirizine (ZYRTEC) 10 MG tablet Take 10 mg by mouth once daily.       chlorhexidine (PERIDEX) 0.12 % solution SMARTSIG:By Mouth       L-LYSINE ORAL Take by mouth.       Lactobacillus acidophilus (PROBIOTIC ORAL) Take by mouth.       mv-min/iron/folic/calcium/vitK (WOMEN'S MULTIVITAMIN ORAL) Take by mouth.       sod sulf-pot chloride-mag sulf (SUTAB) 1.479-0.188- 0.225 gram tablet Take 12 tablets by mouth once daily. Take according to package instructions with indicated amount of water. 24 tablet 0     valACYclovir (VALTREX) 1000 MG tablet TAKE DAILY X 5 DAYS FOR OUTBREAKS 90 tablet 1        Physical Exam:    Vital Signs:   Vitals:    05/27/24 1437   BP: 125/70   Pulse: 92   Resp: 16   Temp: 98.6 °F (37 °C)       General Appearance: Well appearing in no acute distress    Labs:  Lab Results   Component Value Date    WBC 10.69 04/25/2024    HGB 13.9 04/25/2024    HCT 42.3 04/25/2024     (H) 04/25/2024    CHOL 136 07/10/2023    TRIG 57 07/10/2023    HDL 50 07/10/2023    ALT 10 04/25/2024    AST 13 04/25/2024     (L) 04/25/2024    K 3.5 04/25/2024     04/25/2024    CREATININE 0.9 04/25/2024    BUN 9 04/25/2024    CO2 22 (L) 04/25/2024    TSH 0.484 07/10/2023    HGBA1C 5.0 07/10/2023       I have explained the risks and benefits of this endoscopic procedure to the patient including but not limited to bleeding, inflammation, infection, perforation, and death.      John Renee MD

## 2024-05-27 NOTE — PROVATION PATIENT INSTRUCTIONS
Discharge Summary/Instructions after an Endoscopic Procedure  Patient Name: Lela Garcia  Patient MRN: [id number]  Patient YOB: 1990  Monday, May 27, 2024  John Renee MD  Dear patient,  As a result of recent federal legislation (The Federal Cures Act), you may   receive lab or pathology results from your procedure in your [facility]   account before your physician is able to contact you. Your physician or   their representative will relay the results to you with their   recommendations at their soonest availability.  Thank you,  RESTRICTIONS:  During your procedure today, you received medications for sedation.  These   medications may affect your judgment, balance and coordination.  Therefore,   for 24 hours, you have the following restrictions:   - DO NOT drive a car, operate machinery, make legal/financial decisions,   sign important papers or drink alcohol.    ACTIVITY:  Today: no heavy lifting, straining or running due to procedural   sedation/anesthesia.  The following day: return to full activity including work.  DIET:  Eat and drink normally unless instructed otherwise.     TREATMENT FOR COMMON SIDE EFFECTS:  - Mild abdominal pain, nausea, belching, bloating or excessive gas:  rest,   eat lightly and use a heating pad.  - Sore Throat: treat with throat lozenges and/or gargle with warm salt   water.  - Because air was used during the procedure, expelling large amounts of air   from your rectum or belching is normal.  - If a bowel prep was taken, you may not have a bowel movement for 1-3 days.    This is normal.  SYMPTOMS TO WATCH FOR AND REPORT TO YOUR PHYSICIAN:  1. Abdominal pain or bloating, other than gas cramps.  2. Chest pain.  3. Back pain.  4. Signs of infection such as: chills or fever occurring within 24 hours   after the procedure.  5. Rectal bleeding, which would show as bright red, maroon, or black stools.   (A tablespoon of blood from the rectum is not serious, especially if    hemorrhoids are present.)  6. Vomiting.  7. Weakness or dizziness.  GO DIRECTLY TO THE NEAREST EMERGENCY ROOM IF YOU HAVE ANY OF THE FOLLOWING:      Difficulty breathing              Chills and/or fever over 101 F   Persistent vomiting and/or vomiting blood   Severe abdominal pain   Severe chest pain   Black, tarry stools   Bleeding- more than one tablespoon   Any other symptom or condition that you feel may need urgent attention  Your doctor recommends these additional instructions:  If any biopsies were taken, your doctors clinic will contact you in 1 to 2   weeks with any results.  - Discharge patient to home.   - Resume previous diet.   - Continue present medications.   - Perform a colonoscopy today.  For questions, problems or results please call your physician - John Renee MD at Work:  [phone].  [facility], EMERGENCY ROOM PHONE NUMBER: [phone]  IF A COMPLICATION OR EMERGENCY SITUATION ARISES AND YOU ARE UNABLE TO REACH   YOUR PHYSICIAN - GO DIRECTLY TO THE EMERGENCY ROOM.  John Renee MD  5/27/2024 2:56:22 PM  This report has been verified and signed electronically.  Dear patient,  As a result of recent federal legislation (The Federal Cures Act), you may   receive lab or pathology results from your procedure in your [facility]   account before your physician is able to contact you. Your physician or   their representative will relay the results to you with their   recommendations at their soonest availability.  Thank you,  PROVATION

## 2024-05-30 ENCOUNTER — PATIENT MESSAGE (OUTPATIENT)
Dept: GASTROENTEROLOGY | Facility: CLINIC | Age: 34
End: 2024-05-30
Payer: COMMERCIAL

## 2024-05-30 ENCOUNTER — HOSPITAL ENCOUNTER (OUTPATIENT)
Dept: RADIOLOGY | Facility: HOSPITAL | Age: 34
Discharge: HOME | End: 2024-05-30
Attending: STUDENT IN AN ORGANIZED HEALTH CARE EDUCATION/TRAINING PROGRAM
Payer: COMMERCIAL

## 2024-05-30 DIAGNOSIS — K82.4 GALLBLADDER POLYP: ICD-10-CM

## 2024-05-30 PROCEDURE — 76700 US EXAM ABDOM COMPLETE: CPT | Mod: TC

## 2024-05-30 PROCEDURE — 76700 US EXAM ABDOM COMPLETE: CPT | Mod: 26,,, | Performed by: INTERNAL MEDICINE

## 2024-05-31 DIAGNOSIS — K51.319 ULCERATIVE RECTOSIGMOIDITIS WITH COMPLICATION: Primary | ICD-10-CM

## 2024-05-31 LAB
COMMENT: NORMAL
FINAL PATHOLOGIC DIAGNOSIS: NORMAL
GROSS: NORMAL
Lab: NORMAL

## 2024-05-31 RX ORDER — MESALAMINE 1.2 G/1
4.8 TABLET, DELAYED RELEASE ORAL
Qty: 120 TABLET | Refills: 11 | Status: SHIPPED | OUTPATIENT
Start: 2024-05-31 | End: 2025-05-26

## 2024-06-04 ENCOUNTER — LAB VISIT (OUTPATIENT)
Dept: LAB | Facility: HOSPITAL | Age: 34
End: 2024-06-04
Attending: STUDENT IN AN ORGANIZED HEALTH CARE EDUCATION/TRAINING PROGRAM
Payer: COMMERCIAL

## 2024-06-04 DIAGNOSIS — K51.319 ULCERATIVE RECTOSIGMOIDITIS WITH COMPLICATION: ICD-10-CM

## 2024-06-04 PROCEDURE — 83993 ASSAY FOR CALPROTECTIN FECAL: CPT | Performed by: STUDENT IN AN ORGANIZED HEALTH CARE EDUCATION/TRAINING PROGRAM

## 2024-06-07 LAB — CALPROTECTIN STL-MCNT: 711 MCG/G

## 2024-06-10 ENCOUNTER — DOCUMENTATION ONLY (OUTPATIENT)
Dept: PHARMACY | Facility: CLINIC | Age: 34
End: 2024-06-10
Payer: COMMERCIAL

## 2024-06-11 ENCOUNTER — PATIENT MESSAGE (OUTPATIENT)
Dept: INTERNAL MEDICINE | Facility: CLINIC | Age: 34
End: 2024-06-11
Payer: COMMERCIAL

## 2024-06-11 DIAGNOSIS — Z12.83 SKIN EXAM, SCREENING FOR CANCER: Primary | ICD-10-CM

## 2024-06-11 NOTE — TELEPHONE ENCOUNTER
Pt would like ref to see [facility] derm clinic for annual skin exam and mole removal. Pending.

## 2024-06-14 ENCOUNTER — OFFICE VISIT (OUTPATIENT)
Dept: OBSTETRICS AND GYNECOLOGY | Facility: CLINIC | Age: 34
End: 2024-06-14
Payer: MEDICAID

## 2024-06-14 VITALS
SYSTOLIC BLOOD PRESSURE: 119 MMHG | DIASTOLIC BLOOD PRESSURE: 84 MMHG | BODY MASS INDEX: 20.85 KG/M2 | WEIGHT: 129.19 LBS

## 2024-06-14 DIAGNOSIS — Z01.419 WELL WOMAN EXAM WITH ROUTINE GYNECOLOGICAL EXAM: Primary | ICD-10-CM

## 2024-06-14 DIAGNOSIS — Z78.9 USES DEPO-PROVERA AS PRIMARY BIRTH CONTROL METHOD: ICD-10-CM

## 2024-06-14 DIAGNOSIS — Z12.4 PAP SMEAR FOR CERVICAL CANCER SCREENING: ICD-10-CM

## 2024-06-14 DIAGNOSIS — Z30.015 ENCOUNTER FOR INITIAL PRESCRIPTION OF VAGINAL RING HORMONAL CONTRACEPTIVE: ICD-10-CM

## 2024-06-14 PROCEDURE — 99213 OFFICE O/P EST LOW 20 MIN: CPT | Mod: PBBFAC | Performed by: NURSE PRACTITIONER

## 2024-06-14 PROCEDURE — 1159F MED LIST DOCD IN RCRD: CPT | Mod: CPTII,,, | Performed by: NURSE PRACTITIONER

## 2024-06-14 PROCEDURE — 87624 HPV HI-RISK TYP POOLED RSLT: CPT | Performed by: NURSE PRACTITIONER

## 2024-06-14 PROCEDURE — 3079F DIAST BP 80-89 MM HG: CPT | Mod: CPTII,,, | Performed by: NURSE PRACTITIONER

## 2024-06-14 PROCEDURE — 3008F BODY MASS INDEX DOCD: CPT | Mod: CPTII,,, | Performed by: NURSE PRACTITIONER

## 2024-06-14 PROCEDURE — 99395 PREV VISIT EST AGE 18-39: CPT | Mod: S$PBB,,, | Performed by: NURSE PRACTITIONER

## 2024-06-14 PROCEDURE — 3074F SYST BP LT 130 MM HG: CPT | Mod: CPTII,,, | Performed by: NURSE PRACTITIONER

## 2024-06-14 PROCEDURE — 99999 PR PBB SHADOW E&M-EST. PATIENT-LVL III: CPT | Mod: PBBFAC,,, | Performed by: NURSE PRACTITIONER

## 2024-06-14 RX ORDER — ETONOGESTREL AND ETHINYL ESTRADIOL VAGINAL RING .015; .12 MG/D; MG/D
1 RING VAGINAL
Qty: 3 EACH | Refills: 1 | Status: SHIPPED | OUTPATIENT
Start: 2024-06-14 | End: 2025-06-14

## 2024-06-14 NOTE — PROGRESS NOTES
CC: Annual  HPI: Pt is a 34 y.o.  female who presents for routine annual exam. She uses depo for contraception. She does not want STD screening. Considering taking a depo break. Has been on this for 11-12 years now. Amenorrhea which she loves so interested in doing something that would continue this pattern. Years ago noticed she was having more allergic reactions with food right before her menses, this was the main reason she initially started. Not interested in pills (already takes too much medication) or IUD (surgically removed in the past) at this time. Same partner- long distance relationship. Recent surgery for UC. 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    Notes from 2023 visit  CC: Annual  HPI: Pt is a 32 y.o.  female who presents for routine annual exam. She uses depo sq for contraception. She does not want STD screening. Loves depo and associated amenorrhea. Knows potential for reversible bone loss with extended use. Did IUD in past that had to be surgically removed- may try nuvaring and not do the week off. Sometimes has issues with insurance coverage for sub Q depo.      FH:   Breast cancer: none  Colon cancer: none  Ovarian cancer: none  Uterine cancer: none     HPV vaccine: yes     Last pap smear: 2022 lgsil with hpv pos  History of abnormal pap smears: yes- colpo 2022  Colonoscopy: current  DEXA: na  Mammogram: na  STD history: HSV  Birth control: depo  OB history: G0  Tobacco use: no     ROS:  GENERAL: Feeling well overall. Denies fever or chills.   SKIN: Denies rash or lesions.   HEAD: Denies head injury or headache.   NODES: Denies enlarged lymph nodes.   CHEST: Denies chest pain or shortness of breath.   CARDIOVASCULAR: Denies palpitations or left sided chest pain.   ABDOMEN: No abdominal pain, constipation, diarrhea, nausea, vomiting or rectal bleeding.   URINARY: No dysuria, hematuria, or burning on urination.  REPRODUCTIVE: See HPI.   BREASTS: Denies pain, lumps, or nipple discharge.    HEMATOLOGIC: No easy bruisability or excessive bleeding.   MUSCULOSKELETAL: Denies joint pain or swelling.   NEUROLOGIC: Denies syncope or weakness.   PSYCHIATRIC: Denies depression, anxiety or mood swings.    PE:   APPEARANCE: Well nourished, well developed, Black or  female in no acute distress.  NODES: no cervical, supraclavicular, or inguinal lymphadenopathy  BREASTS: Symmetrical, no skin changes or visible lesions. No palpable masses, nipple discharge or adenopathy bilaterally.  ABDOMEN: Soft. No tenderness or masses. No distention. No hernias palpated. No CVA tenderness.  VULVA: No lesions. Normal external female genitalia.  URETHRAL MEATUS: Normal size and location, no lesions, no prolapse.  URETHRA: No masses, tenderness, or prolapse.  VAGINA: Moist. No lesions or lacerations noted. No abnormal discharge present. No odor present.   CERVIX: No lesions or discharge. No cervical motion tenderness.   UTERUS: Normal size, regular shape, mobile, non-tender.  ADNEXA: No tenderness. No fullness or masses palpated in the adnexal regions.   ANUS PERINEUM: Normal.      Diagnosis:  1. Well woman exam with routine gynecological exam    2. Uses Depo-Provera as primary birth control method    3. Pap smear for cervical cancer screening    4. Encounter for initial prescription of vaginal ring hormonal contraceptive        Plan:     Orders Placed This Encounter    HPV High Risk Genotypes, PCR    Liquid-Based Pap Smear, Screening    etonogestreL-ethinyl estradioL (NUVARING) 0.12-0.015 mg/24 hr vaginal ring     Pap updated  Declines std screening  Rx Nuvaring  Mammogram at age 40    Patient was counseled today on the new ACS guidelines for cervical cytology screening as well as the current recommendations for breast cancer screening. She was counseled to follow up with her PCP for other routine health maintenance. Counseling session lasted approximately 10 minutes, and all her questions were answered.  For  women over the age of 65, you can stop having cervical cancer screenings if you have never had abnormal cervical cells or cervical cancer, and youve had three negative Pap tests in a row. (You also can stop screening if youve had two negative Pap and HPV tests in a row in the past 10 years, with at least one test in the past 5 years.),    Follow-up with me in 1 year for routine exam; pap in 3 years.     I spent a total of 25 minutes on the day of the visit.This includes face to face time and non-face to face time preparing to see the patient (eg, review of tests), obtaining and/or reviewing separately obtained history, documenting clinical information in the electronic or other health record, independently interpreting results and communicating results to the patient/family/caregiver, or care coordinator.    As of April 1, 2021, the Cures Act has been passed nationally. This new law requires that all doctors progress notes, lab results, pathology reports and radiology reports be released IMMEDIATELY to the patient in the patient portal. That means that the results are released to you at the EXACT same time they are released to me. Therefore, with all of the patients that I have I am not able to reply to each patient exactly when the results come in. So there will be a delay from when you see the results to when I see them and have time to come up with a response to send you. Also I only see these results when I am on the computer at work. So if the results come in over the weekend or after 5 pm of a work day, I will not see them until the next business day. As you can tell, this is a challenge as a provider to give every patient the quick response they hope for and deserve. So please be patient!     Thanks for your understanding and patience.

## 2024-06-19 ENCOUNTER — OFFICE VISIT (OUTPATIENT)
Dept: INTERNAL MEDICINE | Facility: CLINIC | Age: 34
End: 2024-06-19
Attending: INTERNAL MEDICINE
Payer: MEDICAID

## 2024-06-19 ENCOUNTER — LAB VISIT (OUTPATIENT)
Dept: LAB | Facility: OTHER | Age: 34
End: 2024-06-19
Attending: INTERNAL MEDICINE
Payer: MEDICAID

## 2024-06-19 VITALS
HEART RATE: 88 BPM | OXYGEN SATURATION: 100 % | WEIGHT: 129 LBS | BODY MASS INDEX: 20.73 KG/M2 | SYSTOLIC BLOOD PRESSURE: 120 MMHG | HEIGHT: 66 IN | DIASTOLIC BLOOD PRESSURE: 78 MMHG

## 2024-06-19 DIAGNOSIS — Z86.69 HISTORY OF MIGRAINE: ICD-10-CM

## 2024-06-19 DIAGNOSIS — Z00.00 ANNUAL PHYSICAL EXAM: ICD-10-CM

## 2024-06-19 DIAGNOSIS — Z00.00 ANNUAL PHYSICAL EXAM: Primary | ICD-10-CM

## 2024-06-19 DIAGNOSIS — Z86.19 HISTORY OF CLOSTRIDIUM DIFFICILE INFECTION: ICD-10-CM

## 2024-06-19 DIAGNOSIS — E05.00 GRAVES DISEASE: ICD-10-CM

## 2024-06-19 DIAGNOSIS — K51.80 OTHER ULCERATIVE COLITIS WITHOUT COMPLICATION: ICD-10-CM

## 2024-06-19 DIAGNOSIS — Z12.83 SCREENING EXAM FOR SKIN CANCER: ICD-10-CM

## 2024-06-19 LAB
25(OH)D3+25(OH)D2 SERPL-MCNC: 40 NG/ML (ref 30–96)
ALBUMIN SERPL BCP-MCNC: 3.8 G/DL (ref 3.5–5.2)
ALP SERPL-CCNC: 66 U/L (ref 55–135)
ALT SERPL W/O P-5'-P-CCNC: 15 U/L (ref 10–44)
ANION GAP SERPL CALC-SCNC: 7 MMOL/L (ref 8–16)
AST SERPL-CCNC: 15 U/L (ref 10–40)
BASOPHILS # BLD AUTO: 0.04 K/UL (ref 0–0.2)
BASOPHILS NFR BLD: 0.7 % (ref 0–1.9)
BILIRUB SERPL-MCNC: 0.3 MG/DL (ref 0.1–1)
BUN SERPL-MCNC: 14 MG/DL (ref 6–20)
CALCIUM SERPL-MCNC: 8.6 MG/DL (ref 8.7–10.5)
CHLORIDE SERPL-SCNC: 110 MMOL/L (ref 95–110)
CHOLEST SERPL-MCNC: 157 MG/DL (ref 120–199)
CHOLEST/HDLC SERPL: 2.9 {RATIO} (ref 2–5)
CO2 SERPL-SCNC: 21 MMOL/L (ref 23–29)
CREAT SERPL-MCNC: 0.8 MG/DL (ref 0.5–1.4)
CRP SERPL-MCNC: 0.2 MG/L (ref 0–8.2)
DIFFERENTIAL METHOD BLD: ABNORMAL
EOSINOPHIL # BLD AUTO: 0.1 K/UL (ref 0–0.5)
EOSINOPHIL NFR BLD: 1.3 % (ref 0–8)
ERYTHROCYTE [DISTWIDTH] IN BLOOD BY AUTOMATED COUNT: 14.2 % (ref 11.5–14.5)
EST. GFR  (NO RACE VARIABLE): >60 ML/MIN/1.73 M^2
ESTIMATED AVG GLUCOSE: 91 MG/DL (ref 68–131)
FERRITIN SERPL-MCNC: 10 NG/ML (ref 20–300)
GLUCOSE SERPL-MCNC: 87 MG/DL (ref 70–110)
HBA1C MFR BLD: 4.8 % (ref 4–5.6)
HCT VFR BLD AUTO: 38.6 % (ref 37–48.5)
HDLC SERPL-MCNC: 54 MG/DL (ref 40–75)
HDLC SERPL: 34.4 % (ref 20–50)
HGB BLD-MCNC: 12.3 G/DL (ref 12–16)
IMM GRANULOCYTES # BLD AUTO: 0.01 K/UL (ref 0–0.04)
IMM GRANULOCYTES NFR BLD AUTO: 0.2 % (ref 0–0.5)
IRON SERPL-MCNC: 38 UG/DL (ref 30–160)
LDLC SERPL CALC-MCNC: 93.8 MG/DL (ref 63–159)
LYMPHOCYTES # BLD AUTO: 3 K/UL (ref 1–4.8)
LYMPHOCYTES NFR BLD: 54.3 % (ref 18–48)
MCH RBC QN AUTO: 29 PG (ref 27–31)
MCHC RBC AUTO-ENTMCNC: 31.9 G/DL (ref 32–36)
MCV RBC AUTO: 91 FL (ref 82–98)
MONOCYTES # BLD AUTO: 0.5 K/UL (ref 0.3–1)
MONOCYTES NFR BLD: 9.4 % (ref 4–15)
NEUTROPHILS # BLD AUTO: 1.9 K/UL (ref 1.8–7.7)
NEUTROPHILS NFR BLD: 34.1 % (ref 38–73)
NONHDLC SERPL-MCNC: 103 MG/DL
NRBC BLD-RTO: 0 /100 WBC
PLATELET # BLD AUTO: 397 K/UL (ref 150–450)
PMV BLD AUTO: 10.5 FL (ref 9.2–12.9)
POTASSIUM SERPL-SCNC: 3.7 MMOL/L (ref 3.5–5.1)
PROT SERPL-MCNC: 7.8 G/DL (ref 6–8.4)
RBC # BLD AUTO: 4.24 M/UL (ref 4–5.4)
SATURATED IRON: 7 % (ref 20–50)
SODIUM SERPL-SCNC: 138 MMOL/L (ref 136–145)
T4 FREE SERPL-MCNC: 0.98 NG/DL (ref 0.71–1.51)
TOTAL IRON BINDING CAPACITY: 517 UG/DL (ref 250–450)
TRANSFERRIN SERPL-MCNC: 349 MG/DL (ref 200–375)
TRIGL SERPL-MCNC: 46 MG/DL (ref 30–150)
TSH SERPL DL<=0.005 MIU/L-ACNC: 1.22 UIU/ML (ref 0.4–4)
VIT B12 SERPL-MCNC: 1007 PG/ML (ref 210–950)
WBC # BLD AUTO: 5.56 K/UL (ref 3.9–12.7)

## 2024-06-19 PROCEDURE — 80061 LIPID PANEL: CPT | Performed by: INTERNAL MEDICINE

## 2024-06-19 PROCEDURE — 83540 ASSAY OF IRON: CPT | Performed by: INTERNAL MEDICINE

## 2024-06-19 PROCEDURE — 84443 ASSAY THYROID STIM HORMONE: CPT | Performed by: INTERNAL MEDICINE

## 2024-06-19 PROCEDURE — 82728 ASSAY OF FERRITIN: CPT | Performed by: INTERNAL MEDICINE

## 2024-06-19 PROCEDURE — 84439 ASSAY OF FREE THYROXINE: CPT | Performed by: INTERNAL MEDICINE

## 2024-06-19 PROCEDURE — 99999 PR PBB SHADOW E&M-EST. PATIENT-LVL V: CPT | Mod: PBBFAC,,, | Performed by: INTERNAL MEDICINE

## 2024-06-19 PROCEDURE — 82306 VITAMIN D 25 HYDROXY: CPT | Performed by: INTERNAL MEDICINE

## 2024-06-19 PROCEDURE — 85025 COMPLETE CBC W/AUTO DIFF WBC: CPT | Performed by: INTERNAL MEDICINE

## 2024-06-19 PROCEDURE — 86140 C-REACTIVE PROTEIN: CPT | Performed by: INTERNAL MEDICINE

## 2024-06-19 PROCEDURE — 3074F SYST BP LT 130 MM HG: CPT | Mod: CPTII,,, | Performed by: INTERNAL MEDICINE

## 2024-06-19 PROCEDURE — 80053 COMPREHEN METABOLIC PANEL: CPT | Performed by: INTERNAL MEDICINE

## 2024-06-19 PROCEDURE — 83036 HEMOGLOBIN GLYCOSYLATED A1C: CPT | Performed by: INTERNAL MEDICINE

## 2024-06-19 PROCEDURE — 1159F MED LIST DOCD IN RCRD: CPT | Mod: CPTII,,, | Performed by: INTERNAL MEDICINE

## 2024-06-19 PROCEDURE — 3008F BODY MASS INDEX DOCD: CPT | Mod: CPTII,,, | Performed by: INTERNAL MEDICINE

## 2024-06-19 PROCEDURE — 82607 VITAMIN B-12: CPT | Performed by: INTERNAL MEDICINE

## 2024-06-19 PROCEDURE — 99395 PREV VISIT EST AGE 18-39: CPT | Mod: S$PBB,,, | Performed by: INTERNAL MEDICINE

## 2024-06-19 PROCEDURE — 3078F DIAST BP <80 MM HG: CPT | Mod: CPTII,,, | Performed by: INTERNAL MEDICINE

## 2024-06-19 PROCEDURE — 99215 OFFICE O/P EST HI 40 MIN: CPT | Mod: PBBFAC | Performed by: INTERNAL MEDICINE

## 2024-06-19 PROCEDURE — 1160F RVW MEDS BY RX/DR IN RCRD: CPT | Mod: CPTII,,, | Performed by: INTERNAL MEDICINE

## 2024-06-19 PROCEDURE — 36415 COLL VENOUS BLD VENIPUNCTURE: CPT | Performed by: INTERNAL MEDICINE

## 2024-06-19 NOTE — PROGRESS NOTES
Subjective:       Patient ID: Lela Garcia is a 34 y.o. female.    Chief Complaint: Annual Exam and Chest Pain (Nothing major, but she feels a little soreness in her chest at times. Wondering if related to stress, or medication side effects.)    Here for annual exam    Was without insurance for several months and UC uncontrolled. Complicated by c. Diff.      Reports frequent change in voice. This is intermittent over the past 1-2 years. No overt reflux. No overt allergies or post nasal drip. No swelling of lips, tongue, or throat.       ### UC ###  -02/2023 Dx with UC by Dr riley at [facility] and started on mesalamine.   - was off Tx for several months on account of lack of insurance. In meantime developed c. Diff.   -colonoscopy 5/2024     Migraines improved with Tx of UC.     #### Graves ####  -TSH <0.01 in 12/2018, on repeat free T4 was normal but free t3 elevated.  -Started methimazole since 12/2018. Dose adjusted over the years. Was on 10 mg/day, then 5 mg/day. Atenolol too initially.  -TRAB negative; Thyroglobulin antibody negative  -NM Thyroid scan: 2/2019. Elevated 24 hr uptake at 69%. Diffuse uptake consistent with Graves disease  -then TSH elevated so stopped methimazole since May/June 2020.          Review of Systems   Constitutional:  Negative for chills, fatigue, fever and unexpected weight change.   HENT:  Negative for ear pain, hearing loss, postnasal drip, tinnitus, trouble swallowing and voice change.    Respiratory:  Negative for cough, chest tightness, shortness of breath and wheezing.    Cardiovascular:  Negative for chest pain, palpitations and leg swelling.   Gastrointestinal:  Negative for abdominal pain, blood in stool, diarrhea, nausea and vomiting.   Endocrine: Negative for polydipsia, polyphagia and polyuria.   Genitourinary:  Negative for difficulty urinating, dysuria, hematuria and vaginal bleeding.   Skin:  Negative for rash.   Allergic/Immunologic: Negative for food allergies.  "  Neurological:  Negative for dizziness, numbness and headaches.   Hematological:  Does not bruise/bleed easily.   Psychiatric/Behavioral:  The patient is not nervous/anxious.        Objective:      Vitals:    06/19/24 0802   BP: 120/78   BP Location: Left arm   Patient Position: Sitting   Pulse: 88   SpO2: 100%   Weight: 58.5 kg (128 lb 15.5 oz)   Height: 5' 6" (1.676 m)      Physical Exam  Vitals and nursing note reviewed.   Constitutional:       General: She is not in acute distress.     Appearance: Normal appearance. She is well-developed.   HENT:      Head: Normocephalic and atraumatic.      Mouth/Throat:      Pharynx: No oropharyngeal exudate.   Eyes:      General: No scleral icterus.     Conjunctiva/sclera: Conjunctivae normal.      Pupils: Pupils are equal, round, and reactive to light.   Neck:      Thyroid: No thyromegaly.   Cardiovascular:      Rate and Rhythm: Normal rate and regular rhythm.      Heart sounds: Normal heart sounds. No murmur heard.  Pulmonary:      Effort: Pulmonary effort is normal.      Breath sounds: Normal breath sounds. No wheezing or rales.   Abdominal:      General: There is no distension.   Musculoskeletal:         General: No tenderness.   Lymphadenopathy:      Cervical: No cervical adenopathy.   Skin:     General: Skin is warm and dry.   Neurological:      Mental Status: She is alert and oriented to person, place, and time.   Psychiatric:         Behavior: Behavior normal.         Assessment:       1. Annual physical exam    2. Other ulcerative colitis without complication    3. Graves disease    4. History of migraine    5. History of Clostridium difficile infection    6. Screening exam for skin cancer        Plan:       Lela was seen today for annual exam and chest pain.    Diagnoses and all orders for this visit:    Annual physical exam  -     Ambulatory referral/consult to Dermatology; Future  -     Comprehensive Metabolic Panel; Future  -     Lipid Panel; Future  -     TSH; " Future  -     CBC Auto Differential; Future  -     Hemoglobin A1C; Future    Other ulcerative colitis without complication  -     FERRITIN; Future  -     IRON AND TIBC; Future  -     Ambulatory referral/consult to Nutrition Services; Future  -     Vitamin B12; Future  -     C-REACTIVE PROTEIN; Future  -     Vitamin D; Future    Graves disease  -     TSH; Future  -     T4, FREE; Future    History of migraine    History of Clostridium difficile infection    Screening exam for skin cancer  -     Ambulatory referral/consult to Dermatology; Future           Elkin Villegas MD  Internal Medicine-[facility]        Side effects of medication(s) were discussed in detail and patient voiced understanding.  Patient will call back for any issues or complications.

## 2024-06-20 NOTE — PROGRESS NOTES
I have reviewed your recent lab work.  Your labs look good, except:    1) Your iron could use some help. I recommend 1-2 tablets of slow iron daily to combat this.     Your kidney function is normal.  Your liver studies are normal.  You do not have diabetes.  Your thyroid studies are normal.  Your cholesterol levels look good.  Vitamin b12 supplementation  If you have any questions or concerns about particular lab results please notify me via [facility] messaging or by calling our office at [phone].    Respectfully,  Elkin Villegas

## 2024-06-25 ENCOUNTER — PATIENT MESSAGE (OUTPATIENT)
Dept: GASTROENTEROLOGY | Facility: CLINIC | Age: 34
End: 2024-06-25
Payer: MEDICAID

## 2024-06-26 ENCOUNTER — PATIENT MESSAGE (OUTPATIENT)
Dept: INTERNAL MEDICINE | Facility: CLINIC | Age: 34
End: 2024-06-26
Payer: MEDICAID

## 2024-06-26 DIAGNOSIS — Z00.00 ANNUAL PHYSICAL EXAM: Primary | ICD-10-CM

## 2024-06-27 ENCOUNTER — OFFICE VISIT (OUTPATIENT)
Dept: GASTROENTEROLOGY | Facility: CLINIC | Age: 34
End: 2024-06-27
Payer: MEDICAID

## 2024-06-27 VITALS
TEMPERATURE: 98 F | HEIGHT: 66 IN | HEART RATE: 91 BPM | WEIGHT: 129.44 LBS | BODY MASS INDEX: 20.8 KG/M2 | SYSTOLIC BLOOD PRESSURE: 104 MMHG | DIASTOLIC BLOOD PRESSURE: 67 MMHG | OXYGEN SATURATION: 100 %

## 2024-06-27 DIAGNOSIS — R87.612 LGSIL ON PAP SMEAR OF CERVIX: ICD-10-CM

## 2024-06-27 DIAGNOSIS — K51.00 ULCERATIVE PANCOLITIS WITHOUT COMPLICATION: Primary | ICD-10-CM

## 2024-06-27 DIAGNOSIS — K82.4 GALLBLADDER POLYP: ICD-10-CM

## 2024-06-27 DIAGNOSIS — D50.0 IRON DEFICIENCY ANEMIA DUE TO CHRONIC BLOOD LOSS: ICD-10-CM

## 2024-06-27 PROCEDURE — G2211 COMPLEX E/M VISIT ADD ON: HCPCS | Mod: S$GLB,,, | Performed by: INTERNAL MEDICINE

## 2024-06-27 PROCEDURE — 3008F BODY MASS INDEX DOCD: CPT | Mod: CPTII,S$GLB,, | Performed by: INTERNAL MEDICINE

## 2024-06-27 PROCEDURE — 99215 OFFICE O/P EST HI 40 MIN: CPT | Mod: S$GLB,,, | Performed by: INTERNAL MEDICINE

## 2024-06-27 PROCEDURE — 3044F HG A1C LEVEL LT 7.0%: CPT | Mod: CPTII,S$GLB,, | Performed by: INTERNAL MEDICINE

## 2024-06-27 PROCEDURE — 1159F MED LIST DOCD IN RCRD: CPT | Mod: CPTII,S$GLB,, | Performed by: INTERNAL MEDICINE

## 2024-06-27 PROCEDURE — 1160F RVW MEDS BY RX/DR IN RCRD: CPT | Mod: CPTII,S$GLB,, | Performed by: INTERNAL MEDICINE

## 2024-06-27 PROCEDURE — 3078F DIAST BP <80 MM HG: CPT | Mod: CPTII,S$GLB,, | Performed by: INTERNAL MEDICINE

## 2024-06-27 PROCEDURE — 3074F SYST BP LT 130 MM HG: CPT | Mod: CPTII,S$GLB,, | Performed by: INTERNAL MEDICINE

## 2024-06-27 RX ORDER — MEDROXYPROGESTERONE ACETATE 104 MG/.65ML
INJECTION, SUSPENSION SUBCUTANEOUS
COMMUNITY
Start: 2024-06-14

## 2024-06-27 NOTE — PROGRESS NOTES
[facility]          Inflammatory Bowel Disease New Patient Consultation Note         TODAY'S VISIT DATE:  6/27/2024    Reason for Consult:    Chief Complaint   Patient presents with    Inflammatory Bowel Disease       PCP: Elkin Christianson MD:   Dr. Cathy Michelle    History of Present Illness:  Lela Garcia who is a 34 y.o. female is being seen today at the [facility] on 06/27/2024 for inflammatory bowel disease- ulcerative colitis.  Her history is noted below.  She reports that she is doing much better since increasing the dose of Lialda to 4.8 g daily.  She is having anywhere between 1 and 4 bowel movements a day.  She actually feels better when she has more frequent bowel movements.  Her stools are soft in consistency now but she does continue to have some left-sided abdominal cramping.  There has not been any blood in his stools recently.  She also noted significant improvement with the use of vancomycin for C diff.    IBD History:  In 2022 she was diagnosed with ulcerative colitis.  At that time she had left-sided inflammation.  She was started on Lialda 2.4 g daily and did very well.  Within about a week her stools returned back to normal.  She tried balsalazide due to cost but that did not seem to be as effective.  Upon going back to Lialda 2.4 g daily she continues to do well for about a year.  She subsequently made a lot of dietary changes and was feeling quite well so she stopped the Lialda in 2023.  She did well for awhile but in December of 2023 she began having recurrent issues.  This started at the end of a trip to the [facility].  During this time she was not working and had spotty insurance coverage.  She restarted Lialda and had improvement for a few days and then her symptoms started to come back again.  In April she was seen in the GI clinic and stool studies were positive for C diff.  She  "also had a markedly elevated calprotectin level in early May.  She was treated with vancomycin with significant improvement.  She continues to take Lialda 2.4 g daily.  On May 27, 2024 she underwent a follow-up colonoscopy that showed inflammatory changes extending from the rectum to the ascending colon but most severe in the transverse colon.  Biopsy showed chronic active inflammation.  Around that time her Lialda dose was increased to 4.8 g daily.  Before the increase in dose her stool calprotectin level had declined significantly but remained elevated.    IBD Details:  Dx Date:  2022  Disease type/distribution:  Ulcerative colitis/involvement from the rectum to the ascending colon  Current Treatment:  Lialda 4.8 g daily  Start Date:  May 20, 2024 Response:  Pending  Optimized:  Yes  Adverse reactions:  None  Prior surgeries:  None  CRP Elevation: Y  calprotectin: Markedly elevated with active disease  Disease Complications:  C diff infection  Extraintestinal manifestations:  Iron-deficiency anemia  Prior treatments:   Steroids:  None  5ASA:  Lialda-current medication  IMM:  None  TNF Inh:  None   Anti-Integrin:  None   IL 12/23:  None  DOROTHEA Inh:  None    Previous Clinical Trials:  None    Last Colonoscopy:  May 27, 2024-inflammation extending from the rectum to the ascending colon-most severe in the transverse colon-biopsies with chronic active inflammation    Other Endoscopies:  May 27, 2024-EGD-normal    Imaging:   MRE:  None   CT:  None   Other:  May 30, 2024-ultrasound-small gallbladder polyp    Pertinent Labs:  Lab Results   Component Value Date    SEDRATE 5 07/10/2023    CRP 0.2 06/19/2024     No results found for: "TTGIGA", "IGA"  Lab Results   Component Value Date    TSH 1.222 06/19/2024    FREET4 0.98 06/19/2024     Lab Results   Component Value Date    ESNBGKED07QR 40 06/19/2024    DAUSQSQO72 1007 (H) 06/19/2024     Lab Results   Component Value Date    HEPBSAG NON-REACTIVE 12/14/2018    HEPCAB " "NON-REACTIVE 12/14/2018     Lab Results   Component Value Date    TTW90DNEW Negative 05/25/2018     No results found for: "NIL", "TBAG", "TBAGNIL", "MITOGENNIL", "TBGOLD", "TSPOTSCREN"  No results found for: "TPTMINTERP", "TPMTRESULT"  Lab Results   Component Value Date    STOOLCULTURE  04/25/2024     No Salmonella,Shigella,Vibrio,Campylobacter,Yersinia isolated.    VLIIMQIJTU1S Negative 04/25/2024    SHVGJNDIFA7Y Negative 04/25/2024    CDIFFICILEAN Positive (A) 04/25/2024    CDIFFTOX Positive (A) 04/25/2024     Lab Results   Component Value Date    CALPROTECTIN 711.0 (H) 06/04/2024       Therapeutic Drug Monitoring Labs:  No results found for: "PROMETH"  No results found for: "ANSADAINIT", "INFLIXIMAB", "INFLIXINTERP"    Vaccinations:  No results found for: "HEPBSAB"  No results found for: "HEPAIGG"  No results found for: "VARICELLAZOS", "VARICELLAINT"  Immunization History   Administered Date(s) Administered    COVID-19, MRNA, LN-S, PF (Pfizer) (Gray Cap) 07/26/2022    COVID-19, MRNA, LN-S, PF (Pfizer) (Purple Cap) 10/29/2021    COVID-19, mRNA, LNP-S, bivalent booster, PF (PFIZER OMICRON) 11/29/2022    DTaP (5 Pertussis Antigens) 1990, 1990, 1990, 03/10/1992, 03/10/1995    HIB 1990, 06/06/1991, 03/10/1992    HPV 9-Valent 05/26/2016, 07/21/2016, 11/07/2016    Hepatitis B 06/19/2002, 07/20/2002, 04/07/2003    Influenza 12/09/1999, 10/26/2007, 11/16/2014, 02/26/2017, 10/04/2017, 10/15/2018, 10/21/2020    Influenza - Quadrivalent 08/22/2019    Influenza - Quadrivalent - PF *Preferred* (6 months and older) 10/15/2018, 10/10/2021    Influenza - Trivalent (ADULT) 10/15/2014, 11/16/2014    Influenza - Trivalent - PF (ADULT) 02/26/2017, 10/04/2017    Influenza Whole 12/04/1997    MMR 03/10/1992, 03/10/1995    Meningococcal Conjugate (MCV4P) 07/12/2008    OPV 1990, 1990, 03/10/1992, 03/10/1995    PPD Test 07/21/2008    Td (ADULT) 07/20/2002    Tdap 07/12/2008, 02/26/2017         Review of " Systems  Review of Systems   Constitutional:  Negative for chills, fever and weight loss.   HENT:  Negative for sore throat.    Eyes:  Negative for pain, discharge and redness.   Respiratory:  Negative for cough, shortness of breath and wheezing.    Cardiovascular:  Negative for chest pain, orthopnea and leg swelling.   Gastrointestinal:  Positive for abdominal pain. Negative for blood in stool, constipation, diarrhea, heartburn, melena, nausea and vomiting.   Genitourinary:  Negative for dysuria, frequency and urgency.   Musculoskeletal:  Negative for back pain, joint pain and myalgias.   Skin:  Negative for itching and rash.   Neurological:  Negative for focal weakness and seizures.   Endo/Heme/Allergies:  Does not bruise/bleed easily.   Psychiatric/Behavioral:  Negative for depression. The patient is not nervous/anxious.        Medical History:   Past Medical History:   Diagnosis Date    Graves disease     Seasonal allergies     Ulcerative colitis     Pancolitis - dx 2022       Surgical History:  Past Surgical History:   Procedure Laterality Date    COLONOSCOPY N/A 05/27/2024    Procedure: COLONOSCOPY;  Surgeon: John Renee MD;  Location: [facility] ([address] [place] [address]);  Service: Endoscopy;  Laterality: N/A;  Pos C-Diff, Ok to schedule with Dr. Garcia per Dr. Michelle,  [facility] portal -     ESOPHAGOGASTRODUODENOSCOPY N/A 05/27/2024    Procedure: EGD (ESOPHAGOGASTRODUODENOSCOPY);  Surgeon: John Reene MD;  Location: [address]);  Service: Endoscopy;  Laterality: N/A;    PELVIC LAPAROSCOPY      removal of IUD       Family History:   Family History   Problem Relation Name Age of Onset    Hypertension Maternal Grandmother      Heart disease Maternal Grandfather      Breast cancer Paternal Grandmother Leandra Jose     Hypertension Paternal Grandmother Leandra Jose     Glaucoma Paternal Grandmother Leandra Jose     Diabetes Paternal Grandmother Leandraoscar Garcia     Heart disease Paternal Grandmother Leandra Garcia      "Arthritis Paternal Grandmother Leandra Garcia     Cancer Paternal Grandmother Leandra Garcia     Vision loss Paternal Grandmother Leandra Garcia     Hypertension Mother Aaliyah Garcia     Glaucoma Mother Aaliyah Garcia     Diabetes Mother Aaliyah Garcia     Arthritis Mother Aaliyah Garcia     Parkinsonism Father      Colon cancer Neg Hx      Ovarian cancer Neg Hx         Social History:   Social History     Tobacco Use    Smoking status: Never    Smokeless tobacco: Never   Substance Use Topics    Alcohol use: Yes     Alcohol/week: 4.0 standard drinks of alcohol     Types: 4 Drinks containing 0.5 oz of alcohol per week     Comment: Kombucha    Drug use: Not Currently     Types: Marijuana       Allergies: Reviewed    Home Medications:   Medication List with Changes/Refills   Current Medications    CETIRIZINE (ZYRTEC) 10 MG TABLET    Take 10 mg by mouth once daily.    CHLORHEXIDINE (PERIDEX) 0.12 % SOLUTION        DEPO-SUBQ PROVERA 104 104 MG/0.65 ML INJECTION    Inject into the skin.    ETONOGESTREL-ETHINYL ESTRADIOL (NUVARING) 0.12-0.015 MG/24 HR VAGINAL RING    Place 1 each vaginally every 21 days. Insert one (1) ring vaginally and leave in place for three (3) weeks, then remove for one (1) week.    L-LYSINE ORAL    Take by mouth.    LACTOBACILLUS ACIDOPHILUS (PROBIOTIC ORAL)    Take by mouth.    MESALAMINE (LIALDA) 1.2 GRAM TBEC    Take 4 tablets (4.8 g total) by mouth daily with breakfast.    MV-MIN/IRON/FOLIC/CALCIUM/VITK (WOMEN'S MULTIVITAMIN ORAL)    Take by mouth.    VALACYCLOVIR (VALTREX) 1000 MG TABLET    TAKE DAILY X 5 DAYS FOR OUTBREAKS       Physical Exam:  Vital Signs:  /67 (BP Location: Left arm, Patient Position: Sitting, BP Method: Medium (Automatic))   Pulse 91   Temp 97.9 °F (36.6 °C) (Temporal)   Ht 5' 6" (1.676 m)   Wt 58.7 kg (129 lb 6.6 oz)   SpO2 100%   BMI 20.89 kg/m²   Body mass index is 20.89 kg/m².    Physical Exam  Vitals and nursing note reviewed.   Constitutional:       General: She is not in " acute distress.     Appearance: Normal appearance. She is well-developed. She is not ill-appearing or toxic-appearing.   Eyes:      Conjunctiva/sclera: Conjunctivae normal.      Pupils: Pupils are equal, round, and reactive to light.   Neck:      Thyroid: No thyromegaly.   Cardiovascular:      Rate and Rhythm: Normal rate and regular rhythm.      Heart sounds: Normal heart sounds. No murmur heard.  Pulmonary:      Effort: Pulmonary effort is normal.      Breath sounds: Normal breath sounds. No wheezing or rales.   Abdominal:      General: Bowel sounds are normal. There is no distension.      Palpations: Abdomen is soft. There is no mass.      Tenderness: There is no abdominal tenderness.   Musculoskeletal:         General: No tenderness. Normal range of motion.      Right lower leg: No edema.      Left lower leg: No edema.   Lymphadenopathy:      Cervical: No cervical adenopathy.   Skin:     Findings: No erythema or rash.   Neurological:      General: No focal deficit present.      Mental Status: She is alert and oriented to person, place, and time.   Psychiatric:         Mood and Affect: Mood normal.         Behavior: Behavior normal.         Thought Content: Thought content normal.         Judgment: Judgment normal.         Labs: reviewed and pertinent noted above    Assessment/Plan:  Lela Garcia is a 34 y.o. female with ulcerative colitis. The following issues were addresssed:    1. Ulcerative pancolitis without complication    2. Iron deficiency anemia due to chronic blood loss    3. Gallbladder polyp    4. LGSIL on Pap smear of cervix      1. Ulcerative colitis: Overall she seems to be doing better.  She has only been on the higher dose Lialda for about 4 weeks now.  I recommend that you continue to take this.  We will plan to repeat a stool calprotectin level in early September.  If this has normalized or improved significantly and her symptoms are under good control we will continue the medication  and plan for a follow-up scope after she has been on therapy for about 6 months.  If it remains significantly elevated or if she is having significant increase in symptoms we will plan to recheck a stool C diff at the time of the calprotectin and likely need to proceed with a sigmoidoscopy to reassess her disease activity.  Today we briefly discussed other treatment options including TNF inhibitors, interleukin therapies, S1P modulators, and Entyvio.  Would prefer to avoid immunomodulators if possible given her history cervical dysplasia.      2. Iron deficiency anemia: Recommend starting iron every other day.  Plan to repeat iron studies at follow-up in 4 months.      3. Gallbladder polyp: Repeat ultrasound in 6 months.      4. Cervical dysplasia: Try to avoid thiopurine such as much as possible.  Up-to-date on Pap smear.         # IBD specific health maintenance:  Colon cancer surveillance:  Start in 2030    Annual:  - Eye exam:  Not applicable  - Skin exam (if on IMM/TNF):  Recommend annual skin exam  - reminded pt to use sunblock/hats/sunprotective clothing  - PAP (if immunosuppressed):  Up-to-date    DEXA:  Not applicable    Vitamin D:  Recently normal    Vaccines:    Influenza:  Recommend annual vaccination   Pneumovax:  Discuss in the future   HAV:  Check serology in the future   HBV:  Vaccinated-check serology in the future   Tdap:  Up-to-date   MMR:  Vaccinated-check serologies in the future   VZV:  Check serology in the future   HZV:  Not applicable   HPV:  Vaccinated   Meningococcus:  Vaccinated   COVID: Vaccinated    Follow up: Follow up in about 4 months (around 10/27/2024).    Visit today is associated with current or anticipated ongoing medical care related to this patient's single serious condition/complex condition (ulcerative colitis).      Thank you again for sending Lela Garcia to see Dr. John Miranda today at the [facility]. Please don't hesitate to  contact Dr. Miranda if there are any questions regarding this evaluation, or if you have any other patients with inflammatory bowel disease for whom you would like a consultation. You can reach Dr. Miranda at [phone] or by email at [email or web address]    Tanmay Miranda MD  Department of Gastroenterology  Inflammatory Bowel Disease

## 2024-06-27 NOTE — PROGRESS NOTES
IBD PATIENT INTAKE:    Confirm current PCP: Elkin Christianson  If no PCP-  number given to establish [phone]: N/A    IBD THERAPY (name, dose/frequency):  Mesalamine (Lialda) 4.8g daily    Antibiotics (past 30 Days):  No  If yes   Indication:  Name of antibiotic:  Completion date:       Answers submitted by the patient for this visit:  New Patient Questionnaire  (Submitted on 6/25/2024)  abdominal pain: Yes

## 2024-07-24 ENCOUNTER — NUTRITION (OUTPATIENT)
Dept: NUTRITION | Facility: CLINIC | Age: 34
End: 2024-07-24
Payer: MEDICAID

## 2024-07-24 VITALS — BODY MASS INDEX: 21.16 KG/M2 | WEIGHT: 131.63 LBS | HEIGHT: 66 IN

## 2024-07-24 DIAGNOSIS — Z71.3 DIETARY COUNSELING: ICD-10-CM

## 2024-07-24 DIAGNOSIS — K51.80 OTHER ULCERATIVE COLITIS WITHOUT COMPLICATION: Primary | ICD-10-CM

## 2024-07-24 PROCEDURE — 97802 MEDICAL NUTRITION INDIV IN: CPT | Mod: PBBFAC,PN | Performed by: DIETITIAN, REGISTERED

## 2024-07-24 PROCEDURE — 99999 PR PBB SHADOW E&M-EST. PATIENT-LVL III: CPT | Mod: PBBFAC,,,

## 2024-07-24 PROCEDURE — 99213 OFFICE O/P EST LOW 20 MIN: CPT | Mod: PBBFAC,PN

## 2024-07-24 PROCEDURE — 99999PBSHW PR PBB SHADOW TECHNICAL ONLY FILED TO HB: Mod: PBBFAC,,,

## 2024-07-24 NOTE — PROGRESS NOTES
"Referring Physician:Elkin Christianson MD     Reason for visit:  Chief Complaint   Patient presents with    Ulcerative Colitis           Nutrition Counseling      Initial Visit    :1990     Allergies Reviewed  Meds Reviewed    Anthropometrics  Weight:59.7 kg (131 lb 9.6 oz)  Height:5' 6" (1.676 m)  BMI:Body mass index is 21.24 kg/m².   IBW:  59.3 kg  +/-10%    Meds:  Outpatient Medications Prior to Visit   Medication Sig Dispense Refill    cetirizine (ZYRTEC) 10 MG tablet Take 10 mg by mouth once daily.      chlorhexidine (PERIDEX) 0.12 % solution       DEPO-SUBQ PROVERA 104 104 mg/0.65 mL injection Inject into the skin. (Patient not taking: Reported on 2024)      etonogestreL-ethinyl estradioL (NUVARING) 0.12-0.015 mg/24 hr vaginal ring Place 1 each vaginally every 21 days. Insert one (1) ring vaginally and leave in place for three (3) weeks, then remove for one (1) week. 3 each 1    L-LYSINE ORAL Take by mouth.      Lactobacillus acidophilus (PROBIOTIC ORAL) Take by mouth.      mesalamine (LIALDA) 1.2 gram TbEC Take 4 tablets (4.8 g total) by mouth daily with breakfast. 120 tablet 11    mv-min/iron/folic/calcium/vitK (WOMEN'S MULTIVITAMIN ORAL) Take by mouth.      valACYclovir (VALTREX) 1000 MG tablet TAKE DAILY X 5 DAYS FOR OUTBREAKS 90 tablet 1     No facility-administered medications prior to visit.       Food/Drug Interactions Noted:  n/a    Vitamins/Supplements/Herbs:  see med list (note:  pt showed me a list of vitamins/supplements she is also taking no listed on her med list)    Labs: reviewed     Nutrition Prescription: 1800 Kcals/day( 30 kcal/kg IBW),  47-60 g protein( 0.8-1.0 g/kg IBW)     Support System:   pt does her own grocery shopping and cooking.  Has a vegetable garden and chickens.    Diet Hx:  pt states she is tapering out of a UC flare, which began back in Dec 2023.  She has multiple food allergies/intolerances, some of which she states have resolved.  States her UBW is 125 lbs.  " "She provided foods she does eat:  sweet potatoes, salad most days consisting of leafy greens, avocado/coconut/olive oils, avoids most dairy-uses almond milk and eats coconut milk yogurt although she may eat a little cheese, wheat bread and whole wheat pasta, sorghum, millet, "alternative" breads although she states she doesn't have a gluten allergy, and probiotic foods.  States she doesn't notice GI symptoms when she eats meat.  Has never kept a food journal; states her GI symptoms wax and wane too much for her to identify any particular foods which cause GI distress.  Does have an amanda that tracks her bowel habits, and her "colon" was making loud gurgling sounds during encounter    Breakfast:  may eat sweet potatoes  Dinner:  last night:  wheat pasta with spinach and basil and [place] marinara sauce.  If she wants to make it more hearty, will add grass-fed ground beef.    Current activity level and/or physical limitations:   is a , and teaches classes 4 afternoons/week.  Stays very active in her daily life.    Motivation to make changes/anticipated barriers and/or expected adherence:  no barriers to continued adherence identified    Nutrition-Focus Physical Findings:  pt appears well nourished    Assessment:  pt provided significant medical history and seems very aware of how to manage her diet regimen for UC.  All of her questions were answered, and she verbalized understanding of information.    Nutrition Diagnosis:  Food- and nutrition-related knowledge deficit RT lack of prior exposure to information AEB dx Ulcerative Colitis    Recommendations:  Eat small meals or snacks every 3 or 4 hours.  Do not skip meals. Avoid foods which cause GI symptoms.  Handout provided & reviewed:  Inflammatory Bowel Disease (IBD):  Crohn's Disease and Ulcerative Colitis Nutrition Therapy    Strategies Implemented:   continue current diet regimen    Consultation Time:45 minutes.  Communicated with referring " healthcare provider:  Consult note available in pt's Epic chart per MD discretion  Follow Up:  Pt provided with dietitian contact information and advised to contact me with questions or make future appointment if further intervention needed.

## 2024-07-24 NOTE — PATIENT INSTRUCTIONS
Eat small meals or snacks every 3 or 4 hours.  Do not skip meals.  Avoid foods which cause GI symptoms.

## 2024-08-23 ENCOUNTER — CLINICAL SUPPORT (OUTPATIENT)
Dept: GASTROENTEROLOGY | Facility: CLINIC | Age: 34
End: 2024-08-23
Payer: MEDICAID

## 2024-08-23 DIAGNOSIS — Z71.9 ENCOUNTER FOR HEALTH EDUCATION: Primary | ICD-10-CM

## 2024-08-23 NOTE — PROGRESS NOTES
"Referring Provider: Tanmay Miranda MD  Reason for Nutrition Referral: Nutrition education for Modified Specific Carbohydrate Diet Vs. Mediterranean diet     Patient Information: Lela Garcia 34 y.o.-year-old Black or African American female   Nutrition-related concerns: Pt presents for nutrition education for the Modified SCD Vs. the Mediterranean diet in the face of IBD - UC      Patient was diagnosed with UC 3 years ago. Pt has had intermittent flares every few months (avg. every 6 mo) , most recently this past December  Pt has several food allergies in the past, that have since resolved  During flares, pt states she has diarrhea and abdominal pain, and sometime bleeding  Currently has BMs daily or every other day  Pt has tried many types of diets in the past, ex. Whole 30; Dr. Cadet's Plant Paradox; Intermittent fasting    A = Nutrition Assessment  Anthropometric Data Estimated body mass index is 21.24 kg/m² as calculated from the following:    Height as of 7/24/24: 5' 6" (1.676 m).    Weight as of 7/24/24: 59.7 kg (131 lb 9.6 oz).    Last 3 Weights:   Wt Readings from Last 3 Encounters:   07/24/24 59.7 kg (131 lb 9.6 oz)   06/27/24 58.7 kg (129 lb 6.6 oz)   06/19/24 58.5 kg (128 lb 15.5 oz)      Biochemical Data Labs:   Lab Results   Component Value Date    [id number] 40 06/19/2024    [id number] 1007 (H) 06/19/2024     Lab Results   Component Value Date    HGB 12.3 06/19/2024    HCT 38.6 06/19/2024     Lab Results   Component Value Date    ALBUMIN 3.8 06/19/2024     Hemoglobin A1C   Date Value Ref Range Status   06/19/2024 4.8 4.0 - 5.6 % Final     Comment:     ADA Screening Guidelines:  5.7-6.4%  Consistent with prediabetes  >or=6.5%  Consistent with diabetes    High levels of fetal hemoglobin interfere with the HbA1C  assay. Heterozygous hemoglobin variants (HbS, HgC, etc)do  not significantly interfere with this assay.   However, presence of multiple variants may affect accuracy.   "   07/10/2023 5.0 4.0 - 5.6 % Final     Comment:     ADA Screening Guidelines:  5.7-6.4%  Consistent with prediabetes  >or=6.5%  Consistent with diabetes    High levels of fetal hemoglobin interfere with the HbA1C  assay. Heterozygous hemoglobin variants (HbS, HgC, etc)do  not significantly interfere with this assay.   However, presence of multiple variants may affect accuracy.        Dietary Data  Appetite: Good  Dietary Intake:  Breakfast:  Eggs, amador, sweet potatoes, scrambled eggs, toast pancakes with fruit, baked salmon  Lunch:  Sardines, chicken, salads, grass fed beef, sweet potatoes, cruciferous veggies, shrimp, salmon, mustard/alie greens  Dinner:  Same as lunch  Snacks: 2-3x/day  Dried fruit, pistachios, salsa/queso with chips, dark chocolate, yogurt, potato chips  Other   Once a month, makes own fried chicken wings  Occasionally makes fried onions, but this has decreased in frequency due to gassy side-effects  Pt states she mostly avoids dairy foods, but will eat parmesan and queso, and occasionally almond milk, coconut milk yogurt  Uses sorghum as a starch   Other Data:                       Food Allergies/intolerances: wheat, seeds  Review of patient's allergies indicates:   Allergen Reactions    Iodine and iodide containing products Shortness Of Breath    Shellfish containing products Other (See Comments)     Low BP    Allergen ext-venom-honey bee     [place]     Cherries     Cocoa     Egg derived     Flowers     Garlic     Grass pollen-shawna grass standard     Grass pollen-orchardgrass, standard     Grass pollen-red top, standard     Grass pollen-sweet vernal grass, std.     Hornet venom     Milk containing products (dairy)     Oats (sudhakar)     Orange     Pineapple     Soy     Tree pollen-sharon     Wasp venom     Watermelon     Weed pollen     Wheat Other (See Comments)    Wheat containing prod     Yellow jacket venom      Current Activity Level:Adequate  Dx: IBD - UC     D = Nutrition Diagnosis    Patient Assessment: Patient is at increased nutrition risk due to dx of IBD and need for diet education to manage symptoms and ensure adequate nutrition.    Patient knowledge of healthy eating and exercise patterns was assessed to be good.     Session was spent educating patient on the Modified Specific Carbohydrate Diet vs. Mediterranean Diet. Emphasis placed on Foods Recommended and Foods to Avoid/Foods to Limit on each diet (Lists given on handouts). Reviewed strategies for choosing and consuming healthy, well-balanced meals as well as snacks regularly that are within the MSCD and Mediterranean eating plans (Recipe website sources provided in consult).      Recommended the website [employer] for further MSCD recipes, meal plans, and diet information .    Recommended the website MedInsteadOfMeds.com for Mediterranean diet recipes and further diet information . Also discussed the potential heart health benefits of starting the Mediterranean Diet.     Patient advised to take 1-2 weeks to look through the details of these diet plans and to decide which diet will best suit their lifestyle and taste preferences.     Patient verbalized understanding.  Provided RD contact information for concerns or questions.  Expect good compliance with dietary recommendations at this time.     Education Materials provided:   1.  [facility]: The SCD Diet  2.  Produce Study: [facility] Summary  3.  Academy of Nutrition and Dietetics/NCM: General, Healthful Mediterranean Nutrition Therapy  4.  Website Recommendations: CloudSwitch; Toppr     I = Nutrition Intervention  Recommendation #1 Eat small, balanced meals and snacks every 3-4 hours. Do not skip meals.   Recommendation #3 Utilize recommended web-based resources and book recommendations to familiarize self on diet-acceptable recipes and meal ideas.   Recommendation #4 Follow up consult recommended after chosen diet implementation to determine diet progress and any  further education needs.     M = Nutrition Monitoring   Indicator 1. Diet recall   Indicator 2. GI signs/symtoms     E= Nutrition Evaluation   Goal 1. Diet recall shows good compliance with recommendations reviewed during session    Goal 2. GI signs/symptoms shows trend towards reduced frequency and severity     Consultation Time: 55 Minutes  Follow Up: Patient provided with Dietitian contact number and advised to call or make future appointment if further consultation is needed/desired.    Communication with provider via Epic  Signature: Benita Urbina, MPH, RDN, LDN

## 2024-08-23 NOTE — PROGRESS NOTES
"Referring Provider: Tanmay Miranda MD  Reason for Nutrition Referral:  Ulcerative Colitis    Patient Information: Lela Garcia 34 y.o.-year-old Black or African American female   Nutrition-related concerns: Pt presents for ***  Patient was diagnosed with UC 3 years ago. Pt has had intermittent flares every few months (avg. every 6 mo) , most recently this past December  Pt has several food allergies in the past, that have since resolved  During flares, pt states she has diarrhea and abdominal pain, and sometime bleeding  Currently has BMs daily or every other day  Pt has tried many types of diets in the past, ex. Whole 30; Dr. Cadet's Plant Paradox; Intermittent fasting           A = Nutrition Assessment  Anthropometric Data Estimated body mass index is 21.24 kg/m² as calculated from the following:    Height as of 7/24/24: 5' 6" (1.676 m).    Weight as of 7/24/24: 59.7 kg (131 lb 9.6 oz).  Ideal Body Weight (IBW): ***lbs (***kg); ***%IBW   Last 3 Weights:   Wt Readings from Last 3 Encounters:   07/24/24 59.7 kg (131 lb 9.6 oz)   06/27/24 58.7 kg (129 lb 6.6 oz)   06/19/24 58.5 kg (128 lb 15.5 oz)     Relevant Wt hx:   Biochemical Data Labs:   Lab Results   Component Value Date    [id number] 40 06/19/2024    [id number] 1007 (H) 06/19/2024     Lab Results   Component Value Date    HGB 12.3 06/19/2024    HCT 38.6 06/19/2024     Lab Results   Component Value Date    ALBUMIN 3.8 06/19/2024     Hemoglobin A1C   Date Value Ref Range Status   06/19/2024 4.8 4.0 - 5.6 % Final     Comment:     ADA Screening Guidelines:  5.7-6.4%  Consistent with prediabetes  >or=6.5%  Consistent with diabetes    High levels of fetal hemoglobin interfere with the HbA1C  assay. Heterozygous hemoglobin variants (HbS, HgC, etc)do  not significantly interfere with this assay.   However, presence of multiple variants may affect accuracy.     07/10/2023 5.0 4.0 - 5.6 % Final     Comment:     ADA Screening Guidelines:  5.7-6.4%  " Consistent with prediabetes  >or=6.5%  Consistent with diabetes    High levels of fetal hemoglobin interfere with the HbA1C  assay. Heterozygous hemoglobin variants (HbS, HgC, etc)do  not significantly interfere with this assay.   However, presence of multiple variants may affect accuracy.       Meds:***   Dietary Data  Appetite: {ccbappetite:91248}  Dietary Intake:  Breakfast:  Grits, amador, sweet potatoes, toast, pancakes with fruit  Lunch:  Sardines, salmon, chicken  Dinner:  ***  Snacks: {ccbnumbers:50808}x/day  ***  Other   Pt states she mostly avoids dairy foods, but will eat parmesan and queso, and occasionally almond milk, coconut milk yogurt  Uses sorghum as a starch  Once a month - makes her own fried chicken wings and occasionally fried onions    Overall impression of dietary recall:   ***   Other Data:  Supplements/ MVI:***                       Food Allergies/intolerances: see below  Review of patient's allergies indicates:   Allergen Reactions    Iodine and iodide containing products Shortness Of Breath    Shellfish containing products Other (See Comments)     Low BP    Allergen ext-venom-honey bee     [place]     Cherries     Cocoa     Egg derived     Flowers     Garlic     Grass pollen-shawna grass standard     Grass pollen-orchardgrass, standard     Grass pollen-red top, standard     Grass pollen-sweet vernal grass, std.     Hornet venom     Milk containing products (dairy)     Oats (sudhakar)     Orange     Pineapple     Soy     Tree pollen-sharon     Wasp venom     Watermelon     Weed pollen     Wheat Other (See Comments)    Wheat containing prod     Yellow jacket venom      Current Activity Level:{Adequate / Fair / None:76462}  Dx: ***     D = Nutrition Diagnosis   Patient Assessment: *** is at nutrition risk due to dx of *** and need for diet education.     Patient knowledge of healthy eating and exercise patterns was assessed to be ***. Session was spent educating patient on ***. Emphasis placed on  *** to ensure adherence. Reviewed strategies for choosing and consuming healthy, well-balanced meals as well as snacks regularly.  Also discussed need for vitamin and mineral supplementation. Advised patient of *** need/ no need to add nutrition supplement beverages *** daily.  Patient verbalized understanding.  Provided RD contact information for concerns or questions. Further education will be required to ensure adherence to ***.  Expect *** compliance with dietary recommendations at this time.     Primary Problem: {CCBproblem:93807}  Etiology: Related to {CCBetiology:44603}  Signs/symptoms: As evidenced by {Signs and symptoms:18711}.     Education Materials provided:   1. {Nutrition Handouts:26935}  2. {Nutrition Handouts:84785}  3. {Nutrition Handouts:76412}     I = Nutrition Intervention  Calorie Requirements: ***-*** kcal/day ({CCBKCALS:74673} kcal/kg) *using IBW  Protein requirements: ***-*** g/day ({CCBPROTEIN:67637} g/kg) *using IBW  Recommendation #1 {Recommendations:52715}   Recommendation #2 {Recommendations:85544}   Recommendation #3 {Recommendations:77130}    Recommendation #4 {Recommendations:17196}   Recommendation #5 Follow up as necessary per weight changes and further education needs      M = Nutrition Monitoring   Indicator 1. Diet recall    Indicator 2. Weight/BMI      E= Nutrition Evaluation   Goal 1. Diet recall shows good compliance with recommendations reviewed during session    Goal 2. Weight shows trend towards goal of ***      Consultation Time: {ConsultTime:02073} Minutes  Follow Up: {ccbfup:76215}    Communication with provider via Epic  Signature: Benita Urbina, MPH, RDN, LDN

## 2024-08-27 ENCOUNTER — LAB VISIT (OUTPATIENT)
Dept: LAB | Facility: HOSPITAL | Age: 34
End: 2024-08-27
Attending: INTERNAL MEDICINE
Payer: MEDICAID

## 2024-08-27 DIAGNOSIS — K51.00 ULCERATIVE PANCOLITIS WITHOUT COMPLICATION: ICD-10-CM

## 2024-08-27 PROCEDURE — 83993 ASSAY FOR CALPROTECTIN FECAL: CPT | Performed by: INTERNAL MEDICINE

## 2024-08-30 LAB — CALPROTECTIN STL-MCNT: <5 MCG/G

## 2024-09-06 ENCOUNTER — TELEPHONE (OUTPATIENT)
Dept: ENDOSCOPY | Facility: HOSPITAL | Age: 34
End: 2024-09-06
Payer: MEDICAID

## 2024-09-06 NOTE — TELEPHONE ENCOUNTER
"Contacted the patient to schedule an endoscopy procedure(s) colonoscopy. The patient did not answer the call and left a voice message requesting a call back.      ----- Message -----   From: Tanmay Miranda MD   Sent: 2024  11:45 AM CDT   To: [facility]     Procedure: Colonoscopy     Diagnosis: Ulcerative colitis     Procedure Timin-12 weeks (late November or December)     *If within 4 weeks selected, please amanda as high priority*     *If greater than 12 weeks, please select "5-12 weeks" and delay sending until 3 months prior to requested date*     Location: Any Site     Additional Scheduling Information: No scheduling concerns     Prep Specifications:Standard prep     Is the patient taking a GLP-1 Agonist:no     Have you attached a patient to this message: yes   "

## 2024-09-09 ENCOUNTER — TELEPHONE (OUTPATIENT)
Dept: ENDOSCOPY | Facility: HOSPITAL | Age: 34
End: 2024-09-09
Payer: COMMERCIAL

## 2024-09-09 DIAGNOSIS — K51.919 ULCERATIVE COLITIS WITH COMPLICATION, UNSPECIFIED LOCATION: Primary | ICD-10-CM

## 2024-09-09 DIAGNOSIS — Z12.11 ENCOUNTER FOR SCREENING COLONOSCOPY FOR NON-HIGH-RISK PATIENT: Primary | ICD-10-CM

## 2024-09-09 RX ORDER — SOD SULF/POT CHLORIDE/MAG SULF 1.479 G
12 TABLET ORAL DAILY
Qty: 24 TABLET | Refills: 0 | Status: SHIPPED | OUTPATIENT
Start: 2024-09-09

## 2024-09-09 NOTE — TELEPHONE ENCOUNTER
Spoke to pt to schedule procedure(s) Colonoscopy/EGD       Physician to perform procedure(s) Dr. BRYANNA Miranda  Date of Procedure (s) 11/26/24  Arrival Time 8:30 AM  Time of Procedure(s) 9:30 AM   Location of Procedure(s) [place] 4th Floor  Type of Rx Prep sent to patient: Sutab  Instructions provided to patient via [facility]    Patient was informed on the following information and verbalized understanding. Screening questionnaire reviewed with patient and complete. If procedure requires anesthesia, a responsible adult needs to be present to accompany the patient home, patient cannot drive after receiving anesthesia. Appointment details are tentative, especially check-in time. Patient will receive a prep-op call 7 days prior to confirm check-in time for procedure. If applicable the patient should contact their pharmacy to verify Rx for procedure prep is ready for pick-up. Patient was advised to call the scheduling department at [phone] if pharmacy states no Rx is available. Patient was advised to call the endoscopy scheduling department if any questions or concerns arise.      [facility] Scheduling Department

## 2024-10-17 ENCOUNTER — OFFICE VISIT (OUTPATIENT)
Dept: GASTROENTEROLOGY | Facility: CLINIC | Age: 34
End: 2024-10-17
Payer: MEDICAID

## 2024-10-17 VITALS
HEIGHT: 66 IN | WEIGHT: 130.5 LBS | HEART RATE: 79 BPM | TEMPERATURE: 98 F | OXYGEN SATURATION: 100 % | SYSTOLIC BLOOD PRESSURE: 121 MMHG | BODY MASS INDEX: 20.97 KG/M2 | DIASTOLIC BLOOD PRESSURE: 84 MMHG

## 2024-10-17 DIAGNOSIS — K82.4 GALLBLADDER POLYP: ICD-10-CM

## 2024-10-17 DIAGNOSIS — D50.0 IRON DEFICIENCY ANEMIA DUE TO CHRONIC BLOOD LOSS: ICD-10-CM

## 2024-10-17 DIAGNOSIS — K51.00 ULCERATIVE PANCOLITIS WITHOUT COMPLICATION: Primary | ICD-10-CM

## 2024-10-17 PROCEDURE — 1160F RVW MEDS BY RX/DR IN RCRD: CPT | Mod: CPTII,S$GLB,, | Performed by: INTERNAL MEDICINE

## 2024-10-17 PROCEDURE — 3074F SYST BP LT 130 MM HG: CPT | Mod: CPTII,S$GLB,, | Performed by: INTERNAL MEDICINE

## 2024-10-17 PROCEDURE — 3008F BODY MASS INDEX DOCD: CPT | Mod: CPTII,S$GLB,, | Performed by: INTERNAL MEDICINE

## 2024-10-17 PROCEDURE — 1159F MED LIST DOCD IN RCRD: CPT | Mod: CPTII,S$GLB,, | Performed by: INTERNAL MEDICINE

## 2024-10-17 PROCEDURE — 3044F HG A1C LEVEL LT 7.0%: CPT | Mod: CPTII,S$GLB,, | Performed by: INTERNAL MEDICINE

## 2024-10-17 PROCEDURE — 3079F DIAST BP 80-89 MM HG: CPT | Mod: CPTII,S$GLB,, | Performed by: INTERNAL MEDICINE

## 2024-10-17 PROCEDURE — G2211 COMPLEX E/M VISIT ADD ON: HCPCS | Mod: S$GLB,,, | Performed by: INTERNAL MEDICINE

## 2024-10-17 PROCEDURE — 99213 OFFICE O/P EST LOW 20 MIN: CPT | Mod: S$GLB,,, | Performed by: INTERNAL MEDICINE

## 2024-10-20 DIAGNOSIS — Z30.015 ENCOUNTER FOR INITIAL PRESCRIPTION OF VAGINAL RING HORMONAL CONTRACEPTIVE: ICD-10-CM

## 2024-10-21 RX ORDER — ETONOGESTREL AND ETHINYL ESTRADIOL .015; .12 MG/D; MG/D
INSERT, EXTENDED RELEASE VAGINAL
Qty: 3 EACH | Refills: 3 | Status: SHIPPED | OUTPATIENT
Start: 2024-10-21

## 2024-10-25 ENCOUNTER — PATIENT MESSAGE (OUTPATIENT)
Dept: OBSTETRICS AND GYNECOLOGY | Facility: CLINIC | Age: 34
End: 2024-10-25
Payer: MEDICAID

## 2024-10-28 DIAGNOSIS — Z30.015 ENCOUNTER FOR INITIAL PRESCRIPTION OF VAGINAL RING HORMONAL CONTRACEPTIVE: ICD-10-CM

## 2024-10-28 RX ORDER — ETONOGESTREL AND ETHINYL ESTRADIOL VAGINAL RING .015; .12 MG/D; MG/D
1 RING VAGINAL
Qty: 3 EACH | Refills: 3 | Status: SHIPPED | OUTPATIENT
Start: 2024-10-28 | End: 2024-10-30

## 2024-10-30 DIAGNOSIS — Z30.015 ENCOUNTER FOR INITIAL PRESCRIPTION OF VAGINAL RING HORMONAL CONTRACEPTIVE: ICD-10-CM

## 2024-10-30 RX ORDER — ETONOGESTREL AND ETHINYL ESTRADIOL VAGINAL RING .015; .12 MG/D; MG/D
1 RING VAGINAL
Qty: 3 EACH | Refills: 3 | Status: SHIPPED | OUTPATIENT
Start: 2024-10-30

## 2024-11-22 ENCOUNTER — HOSPITAL ENCOUNTER (OUTPATIENT)
Dept: RADIOLOGY | Facility: HOSPITAL | Age: 34
Discharge: HOME | End: 2024-11-22
Attending: INTERNAL MEDICINE
Payer: MEDICAID

## 2024-11-22 DIAGNOSIS — K82.4 GALLBLADDER POLYP: ICD-10-CM

## 2024-11-22 PROCEDURE — 76705 ECHO EXAM OF ABDOMEN: CPT | Mod: 26,,, | Performed by: RADIOLOGY

## 2024-11-22 PROCEDURE — 76705 ECHO EXAM OF ABDOMEN: CPT | Mod: TC

## 2024-11-25 ENCOUNTER — TELEPHONE (OUTPATIENT)
Dept: ENDOSCOPY | Facility: HOSPITAL | Age: 34
End: 2024-11-25
Payer: MEDICAID

## 2024-11-25 NOTE — TELEPHONE ENCOUNTER
Pt called to verify arrival time for colonoscopy tomorrow and the timing of her medications and clear liquids. All questions answered.

## 2024-11-26 ENCOUNTER — ANESTHESIA EVENT (OUTPATIENT)
Dept: ENDOSCOPY | Facility: HOSPITAL | Age: 34
End: 2024-11-26
Payer: MEDICAID

## 2024-11-26 ENCOUNTER — ANESTHESIA (OUTPATIENT)
Dept: ENDOSCOPY | Facility: HOSPITAL | Age: 34
End: 2024-11-26
Payer: MEDICAID

## 2024-11-26 ENCOUNTER — HOSPITAL ENCOUNTER (OUTPATIENT)
Facility: HOSPITAL | Age: 34
Discharge: HOME | End: 2024-11-26
Attending: INTERNAL MEDICINE | Admitting: INTERNAL MEDICINE
Payer: MEDICAID

## 2024-11-26 VITALS
DIASTOLIC BLOOD PRESSURE: 79 MMHG | BODY MASS INDEX: 20.41 KG/M2 | RESPIRATION RATE: 16 BRPM | HEART RATE: 76 BPM | WEIGHT: 127 LBS | OXYGEN SATURATION: 100 % | SYSTOLIC BLOOD PRESSURE: 130 MMHG | HEIGHT: 66 IN | TEMPERATURE: 98 F

## 2024-11-26 DIAGNOSIS — K51.90 ULCERATIVE COLITIS: ICD-10-CM

## 2024-11-26 LAB
B-HCG UR QL: NEGATIVE
CTP QC/QA: YES

## 2024-11-26 PROCEDURE — 81025 URINE PREGNANCY TEST: CPT | Performed by: INTERNAL MEDICINE

## 2024-11-26 PROCEDURE — 63600175 PHARM REV CODE 636 W HCPCS: Performed by: NURSE ANESTHETIST, CERTIFIED REGISTERED

## 2024-11-26 PROCEDURE — 88305 TISSUE EXAM BY PATHOLOGIST: CPT | Performed by: PATHOLOGY

## 2024-11-26 PROCEDURE — 37000008 HC ANESTHESIA 1ST 15 MINUTES: Performed by: INTERNAL MEDICINE

## 2024-11-26 PROCEDURE — 27201012 HC FORCEPS, HOT/COLD, DISP: Performed by: INTERNAL MEDICINE

## 2024-11-26 PROCEDURE — 37000009 HC ANESTHESIA EA ADD 15 MINS: Performed by: INTERNAL MEDICINE

## 2024-11-26 PROCEDURE — 45380 COLONOSCOPY AND BIOPSY: CPT | Performed by: INTERNAL MEDICINE

## 2024-11-26 PROCEDURE — 45380 COLONOSCOPY AND BIOPSY: CPT | Mod: 22,,, | Performed by: INTERNAL MEDICINE

## 2024-11-26 RX ORDER — SODIUM CHLORIDE 9 MG/ML
INJECTION, SOLUTION INTRAVENOUS CONTINUOUS
Status: DISCONTINUED | OUTPATIENT
Start: 2024-11-26 | End: 2024-11-26 | Stop reason: HOSPADM

## 2024-11-26 RX ORDER — PROPOFOL 10 MG/ML
VIAL (ML) INTRAVENOUS
Status: DISCONTINUED | OUTPATIENT
Start: 2024-11-26 | End: 2024-11-26

## 2024-11-26 RX ORDER — LIDOCAINE HYDROCHLORIDE 20 MG/ML
INJECTION INTRAVENOUS
Status: DISCONTINUED | OUTPATIENT
Start: 2024-11-26 | End: 2024-11-26

## 2024-11-26 RX ADMIN — PROPOFOL 175 MCG/KG/MIN: 10 INJECTION, EMULSION INTRAVENOUS at 07:11

## 2024-11-26 RX ADMIN — LIDOCAINE HYDROCHLORIDE 50 MG: 20 INJECTION INTRAVENOUS at 07:11

## 2024-11-26 RX ADMIN — PROPOFOL 100 MG: 10 INJECTION, EMULSION INTRAVENOUS at 07:11

## 2024-11-26 NOTE — ANESTHESIA POSTPROCEDURE EVALUATION
Anesthesia Post Evaluation    Patient: Lela Garcia    Procedure(s) Performed: Procedure(s) (LRB):  COLONOSCOPY (N/A)    Final Anesthesia Type: general      Patient location during evaluation: PACU  Patient participation: Yes- Able to Participate  Level of consciousness: awake and alert  Post-procedure vital signs: reviewed and stable  Pain management: adequate  Airway patency: patent    PONV status at discharge: No PONV  Anesthetic complications: no      Cardiovascular status: blood pressure returned to baseline  Respiratory status: spontaneous ventilation and room air  Hydration status: euvolemic  Follow-up not needed.              Vitals Value Taken Time   /73 11/26/24 0820   Temp 36.6 °C (97.9 °F) 11/26/24 0810   Pulse 76 11/26/24 0820   Resp 16 11/26/24 0820   SpO2 100 % 11/26/24 0820         No case tracking events are documented in the log.      Pain/Apolonia Score: Apolonia Score: 10 (11/26/2024  8:20 AM)

## 2024-11-26 NOTE — H&P
Short Stay Endoscopy History and Physical    PCP - Elkin Christianson MD    Procedure - Colonoscopy  ASA - 2  Mallampati - per anesthesia  History of Anesthesia problems - no  Family history Anesthesia problems -  no     HPI:  This is a 34 y.o. female here for evaluation of :     Average Risk Screening: No  High risk screening: No  History of polyps: No  Anemia: No  Blood in stools: No  Diarrhea: No  Abdominal Pain: No  Other: UC    Review of Systems:  CONSTITUTIONAL: Denies weight change,  fatigue, fevers, chills, night sweats.  CARDIOVASCULAR: Denies chest pain, shortness of breath, orthopnea and edema.  RESPIRATORY: Denies cough, hemoptysis, dyspnea, and wheezing.  GI: See HPI.    Medical History:  Past Medical History:   Diagnosis Date    Graves disease     Seasonal allergies     Ulcerative colitis     Pancolitis - dx 2022       Surgical History:   Past Surgical History:   Procedure Laterality Date    COLONOSCOPY N/A 05/27/2024    Procedure: COLONOSCOPY;  Surgeon: John Renee MD;  [facility]  Service: Endoscopy;  Laterality: N/A;  Pos C-Diff, Ok to schedule with Dr. Garcia per Dr. Michelle,  [facility] portal - [place]    ESOPHAGOGASTRODUODENOSCOPY N/A 05/27/2024    Procedure: EGD (ESOPHAGOGASTRODUODENOSCOPY);  Surgeon: John Renee MD;  [facility]  Service: Endoscopy;  Laterality: N/A;    PELVIC LAPAROSCOPY      removal of IUD       Family History:   Family History   Problem Relation Name Age of Onset    Hypertension Maternal Grandmother      Heart disease Maternal Grandfather      Breast cancer Paternal Grandmother Leandraoscar Garcia     Hypertension Paternal Grandmother Leandraoscar Garcia     Glaucoma Paternal Grandmother Leandraoscar Garcia     Diabetes Paternal Grandmother Leandraoscar Garcia     Heart disease Paternal Grandmother Leandraoscar Garcia     Arthritis Paternal Grandmother Leandraoscar Garcia     Cancer Paternal Grandmother Leandra Garcia     Vision loss Paternal Grandmother Leandra Garcia      Hypertension Mother Aaliyah Garcia     Glaucoma Mother Aaliyah Garcia     Diabetes Mother Aaliyah Garcia     Arthritis Mother Aaliyah Garcia     Parkinsonism Father      Colon cancer Neg Hx      Ovarian cancer Neg Hx         Social History:   Social History     Tobacco Use    Smoking status: Never    Smokeless tobacco: Never   Substance Use Topics    Alcohol use: Yes     Alcohol/week: 4.0 standard drinks of alcohol     Types: 4 Drinks containing 0.5 oz of alcohol per week     Comment: Kombucha    Drug use: Not Currently     Types: Marijuana       Allergies: Reviewed.    Medications:  No current facility-administered medications on file prior to encounter.     Current Outpatient Medications on File Prior to Encounter   Medication Sig Dispense Refill    cetirizine (ZYRTEC) 10 MG tablet Take 10 mg by mouth once daily.      L-LYSINE ORAL Take by mouth.      Lactobacillus acidophilus (PROBIOTIC ORAL) Take by mouth.      mesalamine (LIALDA) 1.2 gram TbEC Take 4 tablets (4.8 g total) by mouth daily with breakfast. 120 tablet 11    valACYclovir (VALTREX) 1000 MG tablet TAKE DAILY X 5 DAYS FOR OUTBREAKS 90 tablet 1       Physical Exam:  Vital Signs:   Vitals:    11/26/24 0720   BP: 134/84   Pulse: 79   Resp: 16   Temp: 98.7 °F (37.1 °C)     General Appearance: Well appearing in no acute distress  ENT: OP clear  Chest: CTA B  CV: RRR, no m/r/g  Abd: s/nt/nd/nabs  Ext: no edema    Labs:  Reviewed    IMPRESSION:    UC    Plan:  I have explained the risks and benefits of colonoscopy to the patient including but not limited to bleeding, perforation, infection, and death. The patient wishes to proceed with colonoscopy.

## 2024-11-26 NOTE — ANESTHESIA PREPROCEDURE EVALUATION
11/26/2024  Lela Garcia is a 34 y.o., female.  Past Medical History:   Diagnosis Date    Graves disease     Seasonal allergies     Ulcerative colitis     Pancolitis - dx 2022     Past Surgical History:   Procedure Laterality Date    COLONOSCOPY N/A 05/27/2024    Procedure: COLONOSCOPY;  Surgeon: John Renee MD;  Location: [facility] (4TH FLR);  Service: Endoscopy;  Laterality: N/A;  Pos C-Diff, Ok to schedule with Dr. Garcia per Dr. Michelle,  [facility] portal - [place]    ESOPHAGOGASTRODUODENOSCOPY N/A 05/27/2024    Procedure: EGD (ESOPHAGOGASTRODUODENOSCOPY);  Surgeon: John Renee MD;  [facility]  Service: Endoscopy;  Laterality: N/A;    PELVIC LAPAROSCOPY      removal of IUD         Pre-op Assessment    I have reviewed the Patient Summary Reports.     I have reviewed the Nursing Notes. I have reviewed the NPO Status.   I have reviewed the Medications.     Review of Systems  Anesthesia Hx:  No problems with previous Anesthesia             Denies Family Hx of Anesthesia complications.    Denies Personal Hx of Anesthesia complications.                    Hematology/Oncology:  Hematology Normal   Oncology Normal                                   EENT/Dental:  EENT/Dental Normal           Cardiovascular:                 GENTILE                              Pulmonary:      Shortness of breath                  Renal/:  Renal/ Normal                 Hepatic/GI:   PUD,                  Musculoskeletal:  Musculoskeletal Normal                Neurological:  Neurology Normal                                      Endocrine:  Endocrine Normal            Dermatological:  Skin Normal    Psych:  Psychiatric Normal                    Physical Exam  General: Well nourished    Airway:  Mallampati: II   Mouth Opening: Normal  TM Distance: Normal  Tongue: Normal  Neck ROM: Normal  ROM    Dental:  Intact        Anesthesia Plan  Type of Anesthesia, risks & benefits discussed:    Anesthesia Type: Gen Natural Airway  Intra-op Monitoring Plan: Standard ASA Monitors  Informed Consent: Informed consent signed with the Patient and all parties understand the risks and agree with anesthesia plan.  All questions answered.   ASA Score: 2  Day of Surgery Review of History & Physical: H&P Update referred to the surgeon/provider.I have interviewed and examined the patient. I have reviewed the patient's H&P dated: There are no significant changes.     Ready For Surgery From Anesthesia Perspective.     .

## 2024-11-26 NOTE — PROVATION PATIENT INSTRUCTIONS
Discharge Summary/Instructions after an Endoscopic Procedure  Patient Name: Lela Garcia  Patient MRN: [id number]  Patient YOB: 1990  Tuesday, November 26, 2024  Tanmay Miranda MD  Dear patient,  As a result of recent federal legislation (The Federal Cures Act), you may   receive lab or pathology results from your procedure in your [facility]   account before your physician is able to contact you. Your physician or   their representative will relay the results to you with their   recommendations at their soonest availability.  Thank you,  RESTRICTIONS:  During your procedure today, you received medications for sedation.  These   medications may affect your judgment, balance and coordination.  Therefore,   for 24 hours, you have the following restrictions:   - DO NOT drive a car, operate machinery, make legal/financial decisions,   sign important papers or drink alcohol.    ACTIVITY:  Today: no heavy lifting, straining or running due to procedural   sedation/anesthesia.  The following day: return to full activity including work.  DIET:  Eat and drink normally unless instructed otherwise.     TREATMENT FOR COMMON SIDE EFFECTS:  - Mild abdominal pain, nausea, belching, bloating or excessive gas:  rest,   eat lightly and use a heating pad.  - Sore Throat: treat with throat lozenges and/or gargle with warm salt   water.  - Because air was used during the procedure, expelling large amounts of air   from your rectum or belching is normal.  - If a bowel prep was taken, you may not have a bowel movement for 1-3 days.    This is normal.  SYMPTOMS TO WATCH FOR AND REPORT TO YOUR PHYSICIAN:  1. Abdominal pain or bloating, other than gas cramps.  2. Chest pain.  3. Back pain.  4. Signs of infection such as: chills or fever occurring within 24 hours   after the procedure.  5. Rectal bleeding, which would show as bright red, maroon, or black stools.   (A tablespoon of blood from the rectum is not serious, especially  if   hemorrhoids are present.)  6. Vomiting.  7. Weakness or dizziness.  GO DIRECTLY TO THE NEAREST EMERGENCY ROOM IF YOU HAVE ANY OF THE FOLLOWING:      Difficulty breathing              Chills and/or fever over 101 F   Persistent vomiting and/or vomiting blood   Severe abdominal pain   Severe chest pain   Black, tarry stools   Bleeding- more than one tablespoon   Any other symptom or condition that you feel may need urgent attention  Your doctor recommends these additional instructions:  If any biopsies were taken, your doctors clinic will contact you in 1 to 2   weeks with any results.  - Discharge patient to home.   - Resume previous diet today.   - Continue present medications.   - Await pathology results.   - Repeat colonoscopy in 3 years to assess disease activity.   - Return to my office as previously scheduled.   - Patient has a contact number available for emergencies.  The signs and   symptoms of potential delayed complications were discussed with the   patient.  Return to normal activities tomorrow.  Written discharge   instructions were provided to the patient.  For questions, problems or results please call your physician - Tanmay Miranda MD at Work:  [phone].  [facility], EMERGENCY ROOM PHONE NUMBER: [phone]  IF A COMPLICATION OR EMERGENCY SITUATION ARISES AND YOU ARE UNABLE TO REACH   YOUR PHYSICIAN - GO DIRECTLY TO THE EMERGENCY ROOM.  Tanmay Miranda MD  11/26/2024 8:04:49 AM  This report has been verified and signed electronically.  Dear patient,  As a result of recent federal legislation (The Federal Cures Act), you may   receive lab or pathology results from your procedure in your [facility]   account before your physician is able to contact you. Your physician or   their representative will relay the results to you with their   recommendations at their soonest availability.  Thank you,  PROVATION

## 2024-11-26 NOTE — TRANSFER OF CARE
"Anesthesia Transfer of Care Note    Patient: Lela Garcia    Procedure(s) Performed: Procedure(s) (LRB):  COLONOSCOPY (N/A)    Patient location: GI    Anesthesia Type: general    Transport from OR: Transported from OR on room air with adequate spontaneous ventilation    Post pain: adequate analgesia    Post assessment: no apparent anesthetic complications    Post vital signs: stable    Level of consciousness: awake    Nausea/Vomiting: no nausea/vomiting    Complications: none    Transfer of care protocol was followed      Last vitals: Visit Vitals  /68 (BP Location: Left arm, Patient Position: Lying)   Pulse 83   Temp 36.6 °C (97.9 °F) (Temporal)   Resp 16   Ht 5' 6" (1.676 m)   Wt 57.6 kg (127 lb)   SpO2 98%   Breastfeeding No   BMI 20.50 kg/m²     "

## 2024-11-26 NOTE — PLAN OF CARE
Written and verbal discharge instructions given. Patient verbalizes understanding and has no questions at this time.

## 2024-11-27 LAB
FINAL PATHOLOGIC DIAGNOSIS: NORMAL
GROSS: NORMAL
Lab: NORMAL

## 2025-02-13 ENCOUNTER — PATIENT MESSAGE (OUTPATIENT)
Dept: GASTROENTEROLOGY | Facility: CLINIC | Age: 35
End: 2025-02-13
Payer: MEDICAID

## 2025-02-17 DIAGNOSIS — K51.319 ULCERATIVE RECTOSIGMOIDITIS WITH COMPLICATION: ICD-10-CM

## 2025-02-17 RX ORDER — MESALAMINE 1.2 G/1
4.8 TABLET, DELAYED RELEASE ORAL
Qty: 120 TABLET | Refills: 11 | Status: SHIPPED | OUTPATIENT
Start: 2025-02-17 | End: 2026-02-12

## 2025-04-07 ENCOUNTER — PATIENT MESSAGE (OUTPATIENT)
Dept: GASTROENTEROLOGY | Facility: CLINIC | Age: 35
End: 2025-04-07
Payer: COMMERCIAL

## 2025-04-12 ENCOUNTER — PATIENT MESSAGE (OUTPATIENT)
Dept: INTERNAL MEDICINE | Facility: CLINIC | Age: 35
End: 2025-04-12
Payer: COMMERCIAL

## 2025-04-12 DIAGNOSIS — E05.00 GRAVES DISEASE: Primary | ICD-10-CM

## 2025-04-14 NOTE — TELEPHONE ENCOUNTER
"Pt msg,    "Hello, I wanted to see about requesting some labs to check my thyroid levels and/or a CMP. Im not quite due for my annual, but Im starting to have symptoms that all feel like my previous old thyroid issues. I was hoping to do the labs to help assess next steps. Thank you. "    LOV 06/19/24  "

## 2025-04-21 ENCOUNTER — RESULTS FOLLOW-UP (OUTPATIENT)
Dept: GASTROENTEROLOGY | Facility: HOSPITAL | Age: 35
End: 2025-04-21

## 2025-04-21 ENCOUNTER — LAB VISIT (OUTPATIENT)
Dept: LAB | Facility: HOSPITAL | Age: 35
End: 2025-04-21
Attending: INTERNAL MEDICINE
Payer: COMMERCIAL

## 2025-04-21 ENCOUNTER — OFFICE VISIT (OUTPATIENT)
Dept: GASTROENTEROLOGY | Facility: CLINIC | Age: 35
End: 2025-04-21
Payer: COMMERCIAL

## 2025-04-21 VITALS
HEART RATE: 88 BPM | DIASTOLIC BLOOD PRESSURE: 80 MMHG | HEIGHT: 66 IN | WEIGHT: 132.94 LBS | SYSTOLIC BLOOD PRESSURE: 122 MMHG | BODY MASS INDEX: 21.36 KG/M2 | OXYGEN SATURATION: 94 % | TEMPERATURE: 99 F

## 2025-04-21 DIAGNOSIS — K51.00 ULCERATIVE PANCOLITIS WITHOUT COMPLICATION: ICD-10-CM

## 2025-04-21 DIAGNOSIS — E05.00 GRAVES DISEASE: ICD-10-CM

## 2025-04-21 DIAGNOSIS — K59.00 CONSTIPATION, UNSPECIFIED CONSTIPATION TYPE: ICD-10-CM

## 2025-04-21 DIAGNOSIS — D50.0 IRON DEFICIENCY ANEMIA DUE TO CHRONIC BLOOD LOSS: Primary | ICD-10-CM

## 2025-04-21 DIAGNOSIS — D50.0 IRON DEFICIENCY ANEMIA DUE TO CHRONIC BLOOD LOSS: ICD-10-CM

## 2025-04-21 DIAGNOSIS — K82.4 GALLBLADDER POLYP: ICD-10-CM

## 2025-04-21 LAB
25(OH)D3+25(OH)D2 SERPL-MCNC: 71 NG/ML (ref 30–96)
ABSOLUTE EOSINOPHIL (OHS): 0.02 K/UL
ABSOLUTE MONOCYTE (OHS): 0.39 K/UL (ref 0.3–1)
ABSOLUTE NEUTROPHIL COUNT (OHS): 3.11 K/UL (ref 1.8–7.7)
ALBUMIN SERPL BCP-MCNC: 3.7 G/DL (ref 3.5–5.2)
ALP SERPL-CCNC: 47 UNIT/L (ref 40–150)
ALT SERPL W/O P-5'-P-CCNC: 9 UNIT/L (ref 10–44)
ANION GAP (OHS): 8 MMOL/L (ref 8–16)
AST SERPL-CCNC: 13 UNIT/L (ref 11–45)
BASOPHILS # BLD AUTO: 0.03 K/UL
BASOPHILS NFR BLD AUTO: 0.5 %
BILIRUB SERPL-MCNC: 0.8 MG/DL (ref 0.1–1)
BUN SERPL-MCNC: 10 MG/DL (ref 6–20)
CALCIUM SERPL-MCNC: 9.1 MG/DL (ref 8.7–10.5)
CHLORIDE SERPL-SCNC: 105 MMOL/L (ref 95–110)
CO2 SERPL-SCNC: 27 MMOL/L (ref 23–29)
CREAT SERPL-MCNC: 0.8 MG/DL (ref 0.5–1.4)
CRP SERPL-MCNC: 1.9 MG/L
ERYTHROCYTE [DISTWIDTH] IN BLOOD BY AUTOMATED COUNT: 12.1 % (ref 11.5–14.5)
FERRITIN SERPL-MCNC: 45 NG/ML (ref 20–300)
GFR SERPLBLD CREATININE-BSD FMLA CKD-EPI: >60 ML/MIN/1.73/M2
GLUCOSE SERPL-MCNC: 87 MG/DL (ref 70–110)
HAV AB SER QL IA: NORMAL
HBV CORE AB SERPL QL IA: NORMAL
HBV SURFACE AG SERPL QL IA: NORMAL
HCT VFR BLD AUTO: 40.7 % (ref 37–48.5)
HCV AB SERPL QL IA: NORMAL
HGB BLD-MCNC: 13.3 GM/DL (ref 12–16)
IMM GRANULOCYTES # BLD AUTO: 0.01 K/UL (ref 0–0.04)
IMM GRANULOCYTES NFR BLD AUTO: 0.2 % (ref 0–0.5)
IRON SATN MFR SERPL: 20 % (ref 20–50)
IRON SERPL-MCNC: 107 UG/DL (ref 30–160)
LYMPHOCYTES # BLD AUTO: 2.35 K/UL (ref 1–4.8)
MCH RBC QN AUTO: 30.2 PG (ref 27–31)
MCHC RBC AUTO-ENTMCNC: 32.7 G/DL (ref 32–36)
MCV RBC AUTO: 92 FL (ref 82–98)
MUMPS IGG (OHS): >300 AU/ML
MUMPS IGG INTERPRETATION (OHS): POSITIVE
NUCLEATED RBC (/100WBC) (OHS): 0 /100 WBC
PLATELET # BLD AUTO: 380 K/UL (ref 150–450)
PMV BLD AUTO: 11.2 FL (ref 9.2–12.9)
POTASSIUM SERPL-SCNC: 3.7 MMOL/L (ref 3.5–5.1)
PROT SERPL-MCNC: 8 GM/DL (ref 6–8.4)
RBC # BLD AUTO: 4.41 M/UL (ref 4–5.4)
RELATIVE EOSINOPHIL (OHS): 0.3 %
RELATIVE LYMPHOCYTE (OHS): 39.8 % (ref 18–48)
RELATIVE MONOCYTE (OHS): 6.6 % (ref 4–15)
RELATIVE NEUTROPHIL (OHS): 52.6 % (ref 38–73)
RUBEOLA (MEASLES) IGG (OHS): >300 AU/ML
RUBEOLA IGG INTERP. (OHS): POSITIVE
SODIUM SERPL-SCNC: 140 MMOL/L (ref 136–145)
TIBC SERPL-MCNC: 539 UG/DL (ref 250–450)
TRANSFERRIN SERPL-MCNC: 364 MG/DL (ref 200–375)
TSH SERPL-ACNC: 0.65 UIU/ML (ref 0.4–4)
V.ZOSTER IGG INTERP (OHS): POSITIVE
VARICELLA ZOSTER IGG (OHS): 11.9 S/CO
WBC # BLD AUTO: 5.91 K/UL (ref 3.9–12.7)

## 2025-04-21 PROCEDURE — 86706 HEP B SURFACE ANTIBODY: CPT

## 2025-04-21 PROCEDURE — 86140 C-REACTIVE PROTEIN: CPT

## 2025-04-21 PROCEDURE — 3079F DIAST BP 80-89 MM HG: CPT | Mod: CPTII,S$GLB,, | Performed by: INTERNAL MEDICINE

## 2025-04-21 PROCEDURE — 86480 TB TEST CELL IMMUN MEASURE: CPT

## 2025-04-21 PROCEDURE — 3008F BODY MASS INDEX DOCD: CPT | Mod: CPTII,S$GLB,, | Performed by: INTERNAL MEDICINE

## 2025-04-21 PROCEDURE — 3074F SYST BP LT 130 MM HG: CPT | Mod: CPTII,S$GLB,, | Performed by: INTERNAL MEDICINE

## 2025-04-21 PROCEDURE — 87340 HEPATITIS B SURFACE AG IA: CPT

## 2025-04-21 PROCEDURE — 99214 OFFICE O/P EST MOD 30 MIN: CPT | Mod: S$GLB,,, | Performed by: INTERNAL MEDICINE

## 2025-04-21 PROCEDURE — G2211 COMPLEX E/M VISIT ADD ON: HCPCS | Mod: S$GLB,,, | Performed by: INTERNAL MEDICINE

## 2025-04-21 PROCEDURE — 86704 HEP B CORE ANTIBODY TOTAL: CPT

## 2025-04-21 PROCEDURE — 82728 ASSAY OF FERRITIN: CPT

## 2025-04-21 PROCEDURE — 85025 COMPLETE CBC W/AUTO DIFF WBC: CPT

## 2025-04-21 PROCEDURE — 86762 RUBELLA ANTIBODY: CPT

## 2025-04-21 PROCEDURE — 86790 VIRUS ANTIBODY NOS: CPT

## 2025-04-21 PROCEDURE — 86803 HEPATITIS C AB TEST: CPT

## 2025-04-21 PROCEDURE — 82947 ASSAY GLUCOSE BLOOD QUANT: CPT

## 2025-04-21 PROCEDURE — 86787 VARICELLA-ZOSTER ANTIBODY: CPT

## 2025-04-21 PROCEDURE — 86765 RUBEOLA ANTIBODY: CPT

## 2025-04-21 PROCEDURE — 36415 COLL VENOUS BLD VENIPUNCTURE: CPT

## 2025-04-21 PROCEDURE — 84466 ASSAY OF TRANSFERRIN: CPT

## 2025-04-21 PROCEDURE — 1159F MED LIST DOCD IN RCRD: CPT | Mod: CPTII,S$GLB,, | Performed by: INTERNAL MEDICINE

## 2025-04-21 PROCEDURE — 86735 MUMPS ANTIBODY: CPT

## 2025-04-21 PROCEDURE — 84443 ASSAY THYROID STIM HORMONE: CPT

## 2025-04-21 PROCEDURE — 1160F RVW MEDS BY RX/DR IN RCRD: CPT | Mod: CPTII,S$GLB,, | Performed by: INTERNAL MEDICINE

## 2025-04-21 PROCEDURE — 82306 VITAMIN D 25 HYDROXY: CPT

## 2025-04-21 NOTE — PROGRESS NOTES
[facility]          Inflammatory Bowel Disease Follow Up Consultation Note         TODAY'S VISIT DATE:  4/21/2025    Reason for Consult:    Chief Complaint   Patient presents with    Follow-up    Ulcerative Colitis     Ulcerative pancolitis       PCP: Elkin Christianson MD:   No ref. provider found    History of Present Illness:  Lela Garcia who is a 34 y.o. female is being seen today at the [facility] on 04/21/2025 for inflammatory bowel disease- ulcerative colitis.  From an ulcerative colitis standpoint she seems to be doing pretty well.  She underwent a colonoscopy on November 26, 2024.  There was no evidence of any active disease endoscopically.  Biopsy showed some minimal left colon inflammation.  She continued Lialda 4.8 g daily.  Over the last few weeks she has been having an increase in abdominal noises that typically occur at work.  She never has these issues at home.  She has also been having some bloating and feeling a little bit more constipated.  She is having a bowel movement about once a day or sometimes every other day.  Her stools are all formed.  There is no blood in the bowel movements and no abdominal pain.  She states these symptoms are very different from her ulcerative colitis symptoms.  Over the last few months she has also been having some voice changes and in January she had an episode of nausea as well as what sounds like hot flashes that were associated with an increase in stool frequency and some right abdominal pain as well as chest pain issues.  These were similar symptoms to when she had Graves disease in the past.  She also reports that her mother had early perimenopausal symptoms as well as that was another concern of hers.  Her primary care physician has ordered a CMP and a TSH to be done in the near future.  Overall these symptoms have improved but she still having some hot flashes  intermittently.    IBD History:  In 2022 she was diagnosed with ulcerative colitis.  At that time she had left-sided inflammation.  She was started on Lialda 2.4 g daily and did very well.  Within about a week her stools returned back to normal.  She tried balsalazide due to cost but that did not seem to be as effective.  Upon going back to Lialda 2.4 g daily she continues to do well for about a year.  She subsequently made a lot of dietary changes and was feeling quite well so she stopped the Lialda in 2023.  She did well for awhile but in December of 2023 she began having recurrent issues.  This started at the end of a trip to the [place].  During this time she was not working and had spotty insurance coverage.  She restarted Lialda and had improvement for a few days and then her symptoms started to come back again.  In April she was seen in the GI clinic and stool studies were positive for C diff.  She also had a markedly elevated calprotectin level in early May.  She was treated with vancomycin with significant improvement.  She continues to take Lialda 2.4 g daily.  On May 27, 2024 she underwent a follow-up colonoscopy that showed inflammatory changes extending from the rectum to the ascending colon but most severe in the transverse colon.  Biopsy showed chronic active inflammation.  Around that time her Lialda dose was increased to 4.8 g daily.  Before the increase in dose her stool calprotectin level had declined significantly but remained elevated.  She did well on the higher dose of Lialda with regards to symptoms and a follow-up calprotectin level in August of 2024 was normal. She underwent a colonoscopy on November 26, 2024.  There was no evidence of any active disease endoscopically.  Biopsy showed some minimal left colon inflammation.  She continued Lialda 4.8 g daily    IBD Details:  Dx Date:  2022  Disease type/distribution:  Ulcerative colitis/involvement from the rectum to the ascending  "colon  Current Treatment:  Lialda 4.8 g daily  Start Date:  May 20, 2024 Response:  Endoscopic remission  Optimized:  Yes  Adverse reactions:  None  Prior surgeries:  None  CRP Elevation: Y  calprotectin: Markedly elevated with active disease  Disease Complications:  C diff infection  Extraintestinal manifestations:  Iron-deficiency anemia  Prior treatments:   Steroids:  None  5ASA:  Lialda-current medication  IMM:  None  TNF Inh:  None   Anti-Integrin:  None   IL 12/23:  None  DOROTHEA Inh:  None    Previous Clinical Trials:  None    Last Colonoscopy:   November 26, 2024-normal-appearing colon, minimal left-sided colon inflammation on biopsies  May 27, 2024-inflammation extending from the rectum to the ascending colon-most severe in the transverse colon-biopsies with chronic active inflammation    Other Endoscopies:  May 27, 2024-EGD-normal    Imaging:   MRE:  None   CT:  None   Other:  October 2024-stable gallbladder polyp  May 30, 2024-ultrasound-small gallbladder polyp    Pertinent Labs:  Lab Results   Component Value Date    SEDRATE 5 07/10/2023    CRP 0.2 06/19/2024     No results found for: "TTGIGA", "IGA"  Lab Results   Component Value Date    TSH 1.222 06/19/2024    FREET4 0.98 06/19/2024     Lab Results   Component Value Date    IMOUZINC12HH 40 06/19/2024    AMMJIMNI11 1007 (H) 06/19/2024     Lab Results   Component Value Date    HEPBSAG NON-REACTIVE 12/14/2018    HEPCAB NON-REACTIVE 12/14/2018     Lab Results   Component Value Date    CLV09ZNWH Negative 05/25/2018     No results found for: "NIL", "TBAG", "TBAGNIL", "MITOGENNIL", "TBGOLD", "TSPOTSCREN"  No results found for: "TPTMINTERP", "TPMTRESULT"  Lab Results   Component Value Date    STOOLCULTURE  04/25/2024     No Salmonella,Shigella,Vibrio,Campylobacter,Yersinia isolated.    YDEZQRTLDU9C Negative 04/25/2024    QLDEUWETCX6O Negative 04/25/2024    CDIFFICILEAN Positive (A) 04/25/2024    CDIFFTOX Positive (A) 04/25/2024     Lab Results   Component Value " "Date    CALPROTECTIN <5.0 08/27/2024       Therapeutic Drug Monitoring Labs:  No results found for: "PROMETH"  No results found for: "ANSADAINIT", "INFLIXIMAB", "INFLIXINTERP"    Vaccinations:  No results found for: "HEPBSAB"  No results found for: "HEPAIGG"  No results found for: "VARICELLAZOS", "VARICELLAINT"  Immunization History   Administered Date(s) Administered    COVID-19, MRNA, LN-S, PF (Pfizer) (Gray Cap) 07/26/2022    COVID-19, MRNA, LN-S, PF (Pfizer) (Purple Cap) 10/29/2021    COVID-19, mRNA, LNP-S, bivalent booster, PF (PFIZER OMICRON) 11/29/2022    DTaP (5 Pertussis Antigens) 1990, 1990, 1990, 03/10/1992, 03/10/1995    HIB 1990, 06/06/1991, 03/10/1992    HPV 9-Valent 05/26/2016, 07/21/2016, 11/07/2016    Hepatitis B 06/19/2002, 07/20/2002, 04/07/2003    Influenza 12/09/1999, 10/26/2007, 11/16/2014, 02/26/2017, 10/04/2017, 10/15/2018, 10/21/2020    Influenza - Quadrivalent 08/22/2019    Influenza - Quadrivalent - PF *Preferred* (6 months and older) 10/15/2018, 10/10/2021    Influenza - Trivalent - Afluria, Fluzone MDV 10/15/2014, 11/16/2014    Influenza - Trivalent - Fluarix, Flulaval, Fluzone, Afluria - PF 02/26/2017, 10/04/2017    Influenza Whole 12/04/1997    MMR 03/10/1992, 03/10/1995    Meningococcal Conjugate (MCV4P) 07/12/2008    OPV 1990, 1990, 03/10/1992, 03/10/1995    PPD Test 07/21/2008    Td (ADULT) 07/20/2002    Tdap 07/12/2008, 02/26/2017         Review of Systems  Review of Systems   Constitutional:  Negative for chills, fever and weight loss.   HENT:  Negative for sore throat.    Eyes:  Negative for pain, discharge and redness.   Respiratory:  Negative for cough, shortness of breath and wheezing.    Cardiovascular:  Positive for chest pain. Negative for orthopnea and leg swelling.   Gastrointestinal:  Positive for abdominal pain and nausea. Negative for blood in stool, constipation, diarrhea, heartburn, melena and vomiting.   Genitourinary:  Negative " for dysuria, frequency and urgency.   Musculoskeletal:  Negative for back pain, joint pain and myalgias.   Skin:  Negative for itching and rash.   Neurological:  Negative for focal weakness and seizures.   Endo/Heme/Allergies:  Does not bruise/bleed easily.   Psychiatric/Behavioral:  Negative for depression. The patient is nervous/anxious.        Medical History:   Past Medical History:   Diagnosis Date    Graves disease     Seasonal allergies     Ulcerative colitis     Pancolitis - dx 2022       Surgical History:  Past Surgical History:   Procedure Laterality Date    COLONOSCOPY N/A 05/27/2024    Procedure: COLONOSCOPY;  Surgeon: John Renee MD;  [facility]  Service: Endoscopy;  Laterality: N/A;  Pos C-Diff, Ok to schedule with Dr. Garcia per Dr. Michelle,  [facility] portal -     COLONOSCOPY N/A 11/26/2024    Procedure: COLONOSCOPY;  Surgeon: Tanmay Miranda MD;  [facility]  Service: Endoscopy;  Laterality: N/A;  Ref By:althea Wells sutab.  11/19-pre call complete-tb-confirmed 630 am arrival time    ESOPHAGOGASTRODUODENOSCOPY N/A 05/27/2024    Procedure: EGD (ESOPHAGOGASTRODUODENOSCOPY);  Surgeon: John Renee MD;  [facility]  Service: Endoscopy;  Laterality: N/A;    PELVIC LAPAROSCOPY      removal of IUD       Family History:   Family History   Problem Relation Name Age of Onset    Hypertension Maternal Grandmother      Heart disease Maternal Grandfather      Breast cancer Paternal Grandmother Leandra Garcia     Hypertension Paternal Grandmother Leandra Jose     Glaucoma Paternal Grandmother Leandra Garcia     Diabetes Paternal Grandmother Leandra Garcia     Heart disease Paternal Grandmother Leandra Garcia     Arthritis Paternal Grandmother Leandra Garcia     Cancer Paternal Grandmother Leandra Garcia     Vision loss Paternal Grandmother Leandra Garcia     Hypertension Mother Aaliyah Garcia     Glaucoma Mother Aaliyah Garcia     Diabetes Mother Aaliyah  "Jose     Arthritis Mother Aaliyah Garcia     Parkinsonism Father      Colon cancer Neg Hx      Ovarian cancer Neg Hx         Social History:   Social History     Tobacco Use    Smoking status: Never    Smokeless tobacco: Never   Substance Use Topics    Alcohol use: Yes     Alcohol/week: 4.0 standard drinks of alcohol     Types: 4 Drinks containing 0.5 oz of alcohol per week     Comment: Kombucha    Drug use: Not Currently     Types: Marijuana       Allergies: Reviewed    Home Medications:   Medication List with Changes/Refills   Current Medications    CETIRIZINE (ZYRTEC) 10 MG TABLET    Take 10 mg by mouth once daily.    ETONOGESTREL-ETHINYL ESTRADIOL (HALOETTE) 0.12-0.015 MG/24 HR VAGINAL RING    Place 1 each vaginally every 21 days.    L-LYSINE ORAL    Take by mouth.    LACTOBACILLUS ACIDOPHILUS (PROBIOTIC ORAL)    Take by mouth.    MESALAMINE (LIALDA) 1.2 GRAM TBEC    Take 4 tablets (4.8 g total) by mouth daily with breakfast.    VALACYCLOVIR (VALTREX) 1000 MG TABLET    TAKE DAILY X 5 DAYS FOR OUTBREAKS       Physical Exam:  Vital Signs:  /80 (BP Location: Right arm, Patient Position: Sitting)   Pulse 88   Temp 98.8 °F (37.1 °C) (Oral)   Ht 5' 6" (1.676 m)   Wt 60.3 kg (132 lb 15 oz)   SpO2 (!) 94%   BMI 21.46 kg/m²   Body mass index is 21.46 kg/m².    Physical Exam  Vitals and nursing note reviewed.   Constitutional:       General: She is not in acute distress.     Appearance: Normal appearance. She is well-developed. She is not ill-appearing or toxic-appearing.   Eyes:      Conjunctiva/sclera: Conjunctivae normal.      Pupils: Pupils are equal, round, and reactive to light.   Neck:      Thyroid: No thyromegaly.   Cardiovascular:      Rate and Rhythm: Normal rate and regular rhythm.      Heart sounds: Normal heart sounds. No murmur heard.  Pulmonary:      Effort: Pulmonary effort is normal.      Breath sounds: Normal breath sounds. No wheezing or rales.   Abdominal:      General: Bowel sounds are " normal. There is no distension.      Palpations: Abdomen is soft. There is no mass.      Tenderness: There is no abdominal tenderness.   Musculoskeletal:         General: No tenderness. Normal range of motion.      Right lower leg: No edema.      Left lower leg: No edema.   Lymphadenopathy:      Cervical: No cervical adenopathy.   Skin:     Findings: No erythema or rash.   Neurological:      General: No focal deficit present.      Mental Status: She is alert and oriented to person, place, and time.   Psychiatric:         Mood and Affect: Mood normal.         Behavior: Behavior normal.         Thought Content: Thought content normal.         Judgment: Judgment normal.         Labs: reviewed and pertinent noted above    Assessment/Plan:  Lela Garcia is a 34 y.o. female with ulcerative colitis. The following issues were addresssed:    1. Iron deficiency anemia due to chronic blood loss    2. Ulcerative pancolitis without complication    3. Gallbladder polyp    4. Constipation, unspecified constipation type      1. Ulcerative colitis:  Overall she seems to be doing well.  She is due for labs in June but given her recent symptoms that do not sound necessarily related to ulcerative colitis I think it is worthwhile to go ahead and get her labs now.  I did order a stool calprotectin level to evaluate as well.  Continue Lialda 4.8 g daily.    2. Iron deficiency anemia:  Repeat iron studies with next blood draw.    3. Gallbladder polyp: Repeat ultrasound due in October.  If any signs of elevated liver enzymes on labs we will plan to get the ultrasound sooner.    4. Cervical dysplasia: Try to avoid thiopurines if possible.  Up-to-date on Pap smear.    5. Constipation: Check thyroid and calcium levels.    6. Other symptoms: She has been having some hot flashes, voice changes, as well as an episode of nausea chest pain issues that were similar to the symptoms she had when she had thyroid issues in the past.  Plan to  check other labs.  This maybe thyroid related but could also be perimenopausal symptoms even though she is relatively young.  Does not sound related to her ulcerative colitis.         # IBD specific health maintenance:  Colon cancer surveillance:  Start in 2030    Annual:  - Eye exam:  Not applicable  - Skin exam (if on IMM/TNF):  Recommend annual skin exam  - reminded pt to use sunblock/hats/sunprotective clothing  - PAP (if immunosuppressed):  Up-to-date    DEXA:  Not applicable    Vitamin D:  Recently normal    Vaccines:    Influenza:  Up-to-date   Pneumovax:  Discuss in the future   HAV:  Check serology in the future   HBV:  Vaccinated-check serology in the future   Tdap:  Up-to-date   MMR:  Vaccinated-check serologies in the future   VZV:  Check serology in the future   HZV:  Not applicable   HPV:  Vaccinated   Meningococcus:  Vaccinated   COVID: Vaccinated    Follow up: Follow up in about 1 year (around 4/21/2026).    Visit today is associated with current or anticipated ongoing medical care related to this patient's single serious condition/complex condition (ulcerative colitis).      Thank you again for sending Lela Garcia to see Dr. John Miranda today at the [facility]. Please don't hesitate to contact Dr. Miranda if there are any questions regarding this evaluation, or if you have any other patients with inflammatory bowel disease for whom you would like a consultation. You can reach Dr. Miranda at [phone] or by email at [email or web address]    Tanmay Miranda MD  Department of Gastroenterology  Inflammatory Bowel Disease

## 2025-04-22 LAB
MITOGEN MINUS NIL (OHS): 7.76
NIL TB SYNCED (OHS): 0.03
QUANTIFERON GOLD INTERP (OHS): NEGATIVE
RUBV IGG SER-ACNC: 105 IU/ML
RUBV IGG SER-IMP: REACTIVE
TB1 AG MINUS NIL (OHS): 0.03
TB2 AG MINUS NIL (OHS): 0.02

## 2025-04-24 LAB
W HEPATITIS B SURFACE ANTIBODY, QUALITATIVE: POSITIVE
W HEPATITIS B SURFACE ANTIBODY, QUANTITATIVE: 36 MIU/ML

## 2025-04-29 ENCOUNTER — RESULTS FOLLOW-UP (OUTPATIENT)
Dept: INTERNAL MEDICINE | Facility: CLINIC | Age: 35
End: 2025-04-29

## 2025-05-20 ENCOUNTER — PATIENT MESSAGE (OUTPATIENT)
Dept: GASTROENTEROLOGY | Facility: CLINIC | Age: 35
End: 2025-05-20
Payer: COMMERCIAL

## 2025-05-21 ENCOUNTER — PATIENT MESSAGE (OUTPATIENT)
Dept: GASTROENTEROLOGY | Facility: CLINIC | Age: 35
End: 2025-05-21
Payer: COMMERCIAL

## 2025-05-21 DIAGNOSIS — K51.319 ULCERATIVE RECTOSIGMOIDITIS WITH COMPLICATION: ICD-10-CM

## 2025-05-21 RX ORDER — MESALAMINE 1.2 G/1
4.8 TABLET, DELAYED RELEASE ORAL
Qty: 120 TABLET | Refills: 11 | Status: SHIPPED | OUTPATIENT
Start: 2025-05-21 | End: 2026-05-16

## 2025-06-02 ENCOUNTER — TELEPHONE (OUTPATIENT)
Dept: INTERNAL MEDICINE | Facility: CLINIC | Age: 35
End: 2025-06-02
Payer: COMMERCIAL

## 2025-06-04 ENCOUNTER — LAB VISIT (OUTPATIENT)
Dept: LAB | Facility: OTHER | Age: 35
End: 2025-06-04
Attending: INTERNAL MEDICINE
Payer: COMMERCIAL

## 2025-06-04 ENCOUNTER — OFFICE VISIT (OUTPATIENT)
Dept: INTERNAL MEDICINE | Facility: CLINIC | Age: 35
End: 2025-06-04
Attending: INTERNAL MEDICINE
Payer: COMMERCIAL

## 2025-06-04 VITALS
HEART RATE: 74 BPM | BODY MASS INDEX: 21.11 KG/M2 | SYSTOLIC BLOOD PRESSURE: 118 MMHG | OXYGEN SATURATION: 98 % | DIASTOLIC BLOOD PRESSURE: 82 MMHG | HEIGHT: 66 IN | WEIGHT: 131.38 LBS

## 2025-06-04 DIAGNOSIS — K51.80 OTHER ULCERATIVE COLITIS WITHOUT COMPLICATION: ICD-10-CM

## 2025-06-04 DIAGNOSIS — E05.00 GRAVES DISEASE: ICD-10-CM

## 2025-06-04 DIAGNOSIS — F41.1 GAD (GENERALIZED ANXIETY DISORDER): ICD-10-CM

## 2025-06-04 DIAGNOSIS — Z86.69 HISTORY OF MIGRAINE: ICD-10-CM

## 2025-06-04 DIAGNOSIS — E61.1 IRON DEFICIENCY: ICD-10-CM

## 2025-06-04 DIAGNOSIS — Z00.00 ANNUAL PHYSICAL EXAM: Primary | ICD-10-CM

## 2025-06-04 DIAGNOSIS — Z00.00 ANNUAL PHYSICAL EXAM: ICD-10-CM

## 2025-06-04 LAB
ABSOLUTE EOSINOPHIL (OHS): 0.03 K/UL
ABSOLUTE MONOCYTE (OHS): 0.45 K/UL (ref 0.3–1)
ABSOLUTE NEUTROPHIL COUNT (OHS): 3.41 K/UL (ref 1.8–7.7)
ALBUMIN SERPL BCP-MCNC: 3.8 G/DL (ref 3.5–5.2)
ALP SERPL-CCNC: 37 UNIT/L (ref 40–150)
ALT SERPL W/O P-5'-P-CCNC: 11 UNIT/L (ref 10–44)
ANION GAP (OHS): 13 MMOL/L (ref 8–16)
AST SERPL-CCNC: 15 UNIT/L (ref 11–45)
BASOPHILS # BLD AUTO: 0.02 K/UL
BASOPHILS NFR BLD AUTO: 0.3 %
BILIRUB SERPL-MCNC: 1 MG/DL (ref 0.1–1)
BUN SERPL-MCNC: 17 MG/DL (ref 6–20)
CALCIUM SERPL-MCNC: 9.4 MG/DL (ref 8.7–10.5)
CHLORIDE SERPL-SCNC: 106 MMOL/L (ref 95–110)
CHOLEST SERPL-MCNC: 166 MG/DL (ref 120–199)
CHOLEST/HDLC SERPL: 2.7 {RATIO} (ref 2–5)
CO2 SERPL-SCNC: 22 MMOL/L (ref 23–29)
CREAT SERPL-MCNC: 0.8 MG/DL (ref 0.5–1.4)
CRP SERPL-MCNC: 1.5 MG/L
EAG (OHS): 68 MG/DL (ref 68–131)
ERYTHROCYTE [DISTWIDTH] IN BLOOD BY AUTOMATED COUNT: 13.2 % (ref 11.5–14.5)
ERYTHROCYTE [SEDIMENTATION RATE] IN BLOOD BY PHOTOMETRIC METHOD: 18 MM/HR
FERRITIN SERPL-MCNC: 50 NG/ML (ref 20–300)
GFR SERPLBLD CREATININE-BSD FMLA CKD-EPI: >60 ML/MIN/1.73/M2
GLUCOSE SERPL-MCNC: 92 MG/DL (ref 70–110)
HBA1C MFR BLD: 4 % (ref 4–5.6)
HCT VFR BLD AUTO: 39.5 % (ref 37–48.5)
HDLC SERPL-MCNC: 61 MG/DL (ref 40–75)
HDLC SERPL: 36.7 % (ref 20–50)
HGB BLD-MCNC: 12.8 GM/DL (ref 12–16)
IMM GRANULOCYTES # BLD AUTO: 0.02 K/UL (ref 0–0.04)
IMM GRANULOCYTES NFR BLD AUTO: 0.3 % (ref 0–0.5)
LDLC SERPL CALC-MCNC: 85.2 MG/DL (ref 63–159)
LYMPHOCYTES # BLD AUTO: 2.4 K/UL (ref 1–4.8)
MCH RBC QN AUTO: 30.2 PG (ref 27–31)
MCHC RBC AUTO-ENTMCNC: 32.4 G/DL (ref 32–36)
MCV RBC AUTO: 93 FL (ref 82–98)
NONHDLC SERPL-MCNC: 105 MG/DL
NUCLEATED RBC (/100WBC) (OHS): 0 /100 WBC
PLATELET # BLD AUTO: 394 K/UL (ref 150–450)
PMV BLD AUTO: 10.7 FL (ref 9.2–12.9)
POTASSIUM SERPL-SCNC: 3.9 MMOL/L (ref 3.5–5.1)
PROT SERPL-MCNC: 8.3 GM/DL (ref 6–8.4)
RBC # BLD AUTO: 4.24 M/UL (ref 4–5.4)
RELATIVE EOSINOPHIL (OHS): 0.5 %
RELATIVE LYMPHOCYTE (OHS): 37.9 % (ref 18–48)
RELATIVE MONOCYTE (OHS): 7.1 % (ref 4–15)
RELATIVE NEUTROPHIL (OHS): 53.9 % (ref 38–73)
SODIUM SERPL-SCNC: 141 MMOL/L (ref 136–145)
TRIGL SERPL-MCNC: 99 MG/DL (ref 30–150)
TSH SERPL-ACNC: 0.53 UIU/ML (ref 0.4–4)
VIT B12 SERPL-MCNC: 189 PG/ML (ref 210–950)
WBC # BLD AUTO: 6.33 K/UL (ref 3.9–12.7)

## 2025-06-04 PROCEDURE — 84443 ASSAY THYROID STIM HORMONE: CPT

## 2025-06-04 PROCEDURE — 86140 C-REACTIVE PROTEIN: CPT

## 2025-06-04 PROCEDURE — 85025 COMPLETE CBC W/AUTO DIFF WBC: CPT

## 2025-06-04 PROCEDURE — 82728 ASSAY OF FERRITIN: CPT

## 2025-06-04 PROCEDURE — 82607 VITAMIN B-12: CPT

## 2025-06-04 PROCEDURE — 82310 ASSAY OF CALCIUM: CPT

## 2025-06-04 PROCEDURE — 80061 LIPID PANEL: CPT

## 2025-06-04 PROCEDURE — 99999 PR PBB SHADOW E&M-EST. PATIENT-LVL III: CPT | Mod: PBBFAC,,, | Performed by: INTERNAL MEDICINE

## 2025-06-04 PROCEDURE — 36415 COLL VENOUS BLD VENIPUNCTURE: CPT

## 2025-06-04 PROCEDURE — 83036 HEMOGLOBIN GLYCOSYLATED A1C: CPT

## 2025-06-04 PROCEDURE — 85652 RBC SED RATE AUTOMATED: CPT

## 2025-06-05 ENCOUNTER — RESULTS FOLLOW-UP (OUTPATIENT)
Dept: INTERNAL MEDICINE | Facility: CLINIC | Age: 35
End: 2025-06-05

## 2025-06-05 RX ORDER — LANOLIN ALCOHOL/MO/W.PET/CERES
1000 CREAM (GRAM) TOPICAL DAILY
Qty: 90 TABLET | Refills: 1 | Status: SHIPPED | OUTPATIENT
Start: 2025-06-05

## 2025-06-06 NOTE — PROGRESS NOTES
I have reviewed your recent lab work.  Your labs look good, except:    1) Your B12 levels are low and deficient and need replacing. I will send in a prescription for you to take Cyanocobalamin (Vitamin B12) 1000mcg daily. If your insurance will not pay for it it is fine for you to take over the counter vitamin replacement in this scenario. We should repeat levels no sooner than 8 weeks after you start taking your B12. Disclaimer, B12 deficiency, if left untreated, can lead to permanent neurological symptoms and a severe anemia.      Your kidney function is normal.  Your liver studies are normal.  You do not have diabetes.  Your thyroid studies are normal.  Your cholesterol levels look good.    If you have any questions or concerns about particular lab results please notify me via [facility] messaging or by calling our office at [phone].    Respectfully,  Elkin Villegas

## 2025-06-17 ENCOUNTER — TELEPHONE (OUTPATIENT)
Dept: OBSTETRICS AND GYNECOLOGY | Facility: CLINIC | Age: 35
End: 2025-06-17

## 2025-06-17 ENCOUNTER — LAB VISIT (OUTPATIENT)
Dept: LAB | Facility: OTHER | Age: 35
End: 2025-06-17
Attending: NURSE PRACTITIONER
Payer: COMMERCIAL

## 2025-06-17 ENCOUNTER — OFFICE VISIT (OUTPATIENT)
Dept: OBSTETRICS AND GYNECOLOGY | Facility: CLINIC | Age: 35
End: 2025-06-17
Payer: COMMERCIAL

## 2025-06-17 VITALS
BODY MASS INDEX: 22.22 KG/M2 | DIASTOLIC BLOOD PRESSURE: 78 MMHG | HEIGHT: 65 IN | SYSTOLIC BLOOD PRESSURE: 118 MMHG | WEIGHT: 133.38 LBS

## 2025-06-17 DIAGNOSIS — Z01.419 WELL WOMAN EXAM WITH ROUTINE GYNECOLOGICAL EXAM: Primary | ICD-10-CM

## 2025-06-17 DIAGNOSIS — N76.0 ACUTE VAGINITIS: ICD-10-CM

## 2025-06-17 DIAGNOSIS — Z31.69 ENCOUNTER FOR PRECONCEPTION CONSULTATION: ICD-10-CM

## 2025-06-17 DIAGNOSIS — Z30.44 ENCOUNTER FOR SURVEILLANCE OF VAGINAL RING HORMONAL CONTRACEPTIVE DEVICE: ICD-10-CM

## 2025-06-17 DIAGNOSIS — Z12.4 PAP SMEAR FOR CERVICAL CANCER SCREENING: ICD-10-CM

## 2025-06-17 PROBLEM — R87.810 ASCUS WITH POSITIVE HIGH RISK HPV CERVICAL: Status: RESOLVED | Noted: 2023-03-06 | Resolved: 2025-06-17

## 2025-06-17 PROBLEM — Z87.42 HISTORY OF ABNORMAL CERVICAL PAP SMEAR: Status: ACTIVE | Noted: 2022-02-09

## 2025-06-17 PROBLEM — R87.610 ASCUS WITH POSITIVE HIGH RISK HPV CERVICAL: Status: RESOLVED | Noted: 2023-03-06 | Resolved: 2025-06-17

## 2025-06-17 PROCEDURE — 81515 NFCT DS BV&VAGINITIS DNA ALG: CPT | Performed by: NURSE PRACTITIONER

## 2025-06-17 PROCEDURE — 87340 HEPATITIS B SURFACE AG IA: CPT

## 2025-06-17 PROCEDURE — 3008F BODY MASS INDEX DOCD: CPT | Mod: CPTII,S$GLB,, | Performed by: NURSE PRACTITIONER

## 2025-06-17 PROCEDURE — 86762 RUBELLA ANTIBODY: CPT

## 2025-06-17 PROCEDURE — 1159F MED LIST DOCD IN RCRD: CPT | Mod: CPTII,S$GLB,, | Performed by: NURSE PRACTITIONER

## 2025-06-17 PROCEDURE — 3078F DIAST BP <80 MM HG: CPT | Mod: CPTII,S$GLB,, | Performed by: NURSE PRACTITIONER

## 2025-06-17 PROCEDURE — 99395 PREV VISIT EST AGE 18-39: CPT | Mod: 25,S$GLB,, | Performed by: NURSE PRACTITIONER

## 2025-06-17 PROCEDURE — 86787 VARICELLA-ZOSTER ANTIBODY: CPT

## 2025-06-17 PROCEDURE — 3074F SYST BP LT 130 MM HG: CPT | Mod: CPTII,S$GLB,, | Performed by: NURSE PRACTITIONER

## 2025-06-17 PROCEDURE — 3044F HG A1C LEVEL LT 7.0%: CPT | Mod: CPTII,S$GLB,, | Performed by: NURSE PRACTITIONER

## 2025-06-17 PROCEDURE — 36415 COLL VENOUS BLD VENIPUNCTURE: CPT

## 2025-06-17 PROCEDURE — 99999 PR PBB SHADOW E&M-EST. PATIENT-LVL III: CPT | Mod: PBBFAC,,, | Performed by: NURSE PRACTITIONER

## 2025-06-17 RX ORDER — FERROUS SULFATE 137(45) MG
TABLET, EXTENDED RELEASE ORAL
COMMUNITY
Start: 2025-04-21

## 2025-06-17 RX ORDER — ETONOGESTREL AND ETHINYL ESTRADIOL VAGINAL RING .015; .12 MG/D; MG/D
1 RING VAGINAL
Qty: 4 EACH | Refills: 4 | Status: SHIPPED | OUTPATIENT
Start: 2025-06-17

## 2025-06-17 RX ORDER — IBUPROFEN 100 MG/5ML
1000 SUSPENSION, ORAL (FINAL DOSE FORM) ORAL 2 TIMES DAILY
COMMUNITY
Start: 2025-04-21

## 2025-06-17 NOTE — TELEPHONE ENCOUNTER
Spoke with pharmacy to clarify that patient is using HALOETTE continuously and not taking a break for 7 days every 21 days.

## 2025-06-17 NOTE — PROGRESS NOTES
CC: Annual  HPI: Pt is a 35 y.o.  female who presents for routine annual exam. She uses nuvaring for contraception. She does not want STD screening.     Partner moved to [facility] recently. He is 35, no kids. He is ready to start TTC, she wants kids but not quite there. Questions about her UC and medications. Currently taking nuvaring continuously to avoid menses. Aware of age and AMA status, would like 3 kids total.     Possible yeast infection today- slight itching. In past has had BV, no current odor.     History of HSV- a few days ago noticed a possible sign of outbreak at posterior introitus, started with valtrex. May just be fissure- not painful per se. Has been avoiding intercourse since it popped up.     2024 notes with me-  CC: Annual  HPI: Pt is a 34 y.o.  female who presents for routine annual exam. She uses depo for contraception. She does not want STD screening. Considering taking a depo break. Has been on this for 11-12 years now. Amenorrhea which she loves so interested in doing something that would continue this pattern. Years ago noticed she was having more allergic reactions with food right before her menses, this was the main reason she initially started. Not interested in pills (already takes too much medication) or IUD (surgically removed in the past) at this time. Same partner- long distance relationship. Recent surgery for UC. 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     FH:   Breast cancer: none  Colon cancer: none  Ovarian cancer: none  Uterine cancer: none     HPV vaccine: yes  Last pap smear: 2024 nilm hpv pos nhr  2023 colpo JUNG I  2022 lgsil with hpv pos  History of abnormal pap smears: yes- colpo 2022  Colonoscopy: current  DEXA: na  Mammogram: na  STD history: HSV  Birth control: nuvaring  OB history: G0  Tobacco use: no    ROS:  GENERAL: Feeling well overall. Denies fever or chills.   SKIN: Denies rash or lesions.   HEAD: Denies head injury or headache.   NODES: Denies enlarged lymph  nodes.   CHEST: Denies chest pain or shortness of breath.   CARDIOVASCULAR: Denies palpitations or left sided chest pain.   ABDOMEN: No abdominal pain, constipation, diarrhea, nausea, vomiting or rectal bleeding.   URINARY: No dysuria, hematuria, or burning on urination.  REPRODUCTIVE: See HPI.   BREASTS: Denies pain, lumps, or nipple discharge.   HEMATOLOGIC: No easy bruisability or excessive bleeding.   MUSCULOSKELETAL: Denies joint pain or swelling.   NEUROLOGIC: Denies syncope or weakness.   PSYCHIATRIC: Denies depression, anxiety or mood swings.    PE:   APPEARANCE: Well nourished, well developed, Black or  female in no acute distress.  NODES: no cervical, supraclavicular, or inguinal lymphadenopathy  BREASTS: Symmetrical, no skin changes or visible lesions. No palpable masses, nipple discharge or adenopathy bilaterally.  ABDOMEN: Soft. No tenderness or masses. No distention. No hernias palpated. No CVA tenderness.  VULVA: No lesions. Normal external female genitalia. 2 small fissure like areas at posterior introitus.   URETHRAL MEATUS: Normal size and location, no lesions, no prolapse.  URETHRA: No masses, tenderness, or prolapse.  VAGINA: Moist. No lesions or lacerations noted. No abnormal discharge present. No odor present.   CERVIX: No lesions or discharge. No cervical motion tenderness.   UTERUS: Normal size, regular shape, mobile, non-tender.  ADNEXA: No tenderness. No fullness or masses palpated in the adnexal regions.   ANUS PERINEUM: Normal.      Diagnosis:  1. Well woman exam with routine gynecological exam    2. Acute vaginitis    3. Encounter for surveillance of vaginal ring hormonal contraceptive device    4. Pap smear for cervical cancer screening    5. Encounter for preconception consultation        Plan:     Orders Placed This Encounter    Vaginosis Screen by DNA Probe    Rubella Antibody, IgG    Varicella zoster antibody, IgG    Hepatitis B Surface Antigen    Liquid-Based Pap  Smear, Screening    etonogestreL-ethinyl estradioL (HALOETTE) 0.12-0.015 mg/24 hr vaginal ring       Pap updated  Labs for preconception, discussed mfm consult prior to TTC for UC medication   Declines std screening  Vaginosis pending  Mammogram age 40  Recommended valtrex for possible outbreak. Also discussed general comfort measures to area.     I discussed with the patient today that if a woman has regular periods and is under the age of 35, she can try for 1 year before seeing a fertility specialist and/or starting a basic workup.  If patients are having regular cycles and are 35-39 years old, patients can try for 6 months before seeing a fertility specialist.   If patients are 40 years old or older, have irregular cycles, or have known infertility risk factors, patients should see a fertility specialist right away.      Preconception counseling done. Discussed starting a daily prenatal vitamin with DHA and folic acid (400 mcg) at least 1 month prior to conceiving. Reviewed immunization status and vaccines that are recommended prior to pregnancy. Women of reproductive age should have their immunization status assessed annually for Tdap, measles-mumps-rubella, hepatitis B, and varicella. All patients should receive an annual influenza vaccination; those women who are or will be pregnant during influenza season will have additional benefits. All prescription and nonpresciption medications were reviewed, including their pregnancy safety. She was encouraged to try to attain a body mass index (BMI) in the normal range before attempting pregnancy, as abnormal high or low BMI is associated with infertility and maternal and fetal pregnancy complications. We also discussed the use of alcohol, nicotine, and drug use.    Patient was counseled today on the new ACS guidelines for cervical cytology screening as well as the current recommendations for breast cancer screening. She was counseled to follow up with her PCP for  other routine health maintenance. Counseling session lasted approximately 20 minutes, and all her questions were answered.  For women over the age of 65, you can stop having cervical cancer screenings if you have never had abnormal cervical cells or cervical cancer, and youve had three negative Pap tests in a row. (You also can stop screening if youve had two negative Pap and HPV tests in a row in the past 10 years, with at least one test in the past 5 years.),    Follow-up with me in 1 year for routine exam; pap in 3 years.     I spent a total of 45 minutes on the day of the visit.This includes face to face time and non-face to face time preparing to see the patient (eg, review of tests), obtaining and/or reviewing separately obtained history, documenting clinical information in the electronic or other health record, independently interpreting results and communicating results to the patient/family/caregiver, or care coordinator.    As of April 1, 2021, the Cures Act has been passed nationally. This new law requires that all doctors progress notes, lab results, pathology reports and radiology reports be released IMMEDIATELY to the patient in the patient portal. That means that the results are released to you at the EXACT same time they are released to me. Therefore, with all of the patients that I have I am not able to reply to each patient exactly when the results come in. So there will be a delay from when you see the results to when I see them and have time to come up with a response to send you. Also I only see these results when I am on the computer at work. So if the results come in over the weekend or after 5 pm of a work day, I will not see them until the next business day. As you can tell, this is a challenge as a provider to give every patient the quick response they hope for and deserve. So please be patient!     Thanks for your understanding and patience.

## 2025-06-18 ENCOUNTER — RESULTS FOLLOW-UP (OUTPATIENT)
Dept: OBSTETRICS AND GYNECOLOGY | Facility: CLINIC | Age: 35
End: 2025-06-18

## 2025-06-18 LAB
HBV SURFACE AG SERPL QL IA: NORMAL
RUBV IGG SER-ACNC: 99 IU/ML
RUBV IGG SER-IMP: REACTIVE
V.ZOSTER IGG INTERP (OHS): POSITIVE
VARICELLA ZOSTER IGG (OHS): 11.3 S/CO

## 2025-06-19 DIAGNOSIS — B37.31 VAGINAL YEAST INFECTION: Primary | ICD-10-CM

## 2025-06-19 LAB
BACTERIAL VAGINOSIS DNA (OHS): NOT DETECTED
CANDIDA GLABRATA/KRUSEI DNA (OHS): NOT DETECTED
CANDIDA SPECIES DNA (OHS): DETECTED
TRICHOMONAS VAGINALIS DNA (OHS): NOT DETECTED

## 2025-06-19 RX ORDER — FLUCONAZOLE 200 MG/1
200 TABLET ORAL
Qty: 2 TABLET | Refills: 0 | Status: SHIPPED | OUTPATIENT
Start: 2025-06-19 | End: 2025-06-23

## 2025-07-22 ENCOUNTER — PATIENT MESSAGE (OUTPATIENT)
Dept: GASTROENTEROLOGY | Facility: CLINIC | Age: 35
End: 2025-07-22
Payer: COMMERCIAL

## 2025-07-22 DIAGNOSIS — K51.00 ULCERATIVE PANCOLITIS WITHOUT COMPLICATION: Primary | ICD-10-CM

## 2025-07-22 NOTE — TELEPHONE ENCOUNTER
Spoke with Lela:  Reviewed Dr. Miranda's recommendation to turn in stool for calprotectin now and consider Miralax for constipation  Miralax 1 capful/d and if no BM in a couple days, increase to 1 capful BID, if no BM in a couple days, contact us. If diarrhea develops, reduce dose.  She states understanding and agrees with this plan

## 2025-07-22 NOTE — TELEPHONE ENCOUNTER
"Reason for Call: Change in condition Constipation, Abd pain  Contact: Called & spoke to patient  Date of symptom onset: worsening over the last several weeks  Symptom details:   BM/d: 0-1, having BM 5 of 7 days, soft formed, was "snakes" but now small pieces, doesn't feel empty  blood: trace, mixed in stool   mucous: trace  false BR trips/d: 5, passes gas  bloating present  Pain: intermittent LLQ ache, 4/10 at worst  Started taking psyllium husk about 2 wks ago, initially she felt it was helping, serving size is 6 capsules but she's taking 4 caps, drinking at least 40 oz water plus other beverages daily  Took a Senna gummy 7/21 with no stool results  Takes Slow FE 5 of 7 days per week  Denies: noc BM, fever, N/V  Disease phenotype: UC, involvement from the rectum to the ascending colon   Current IBD meds: Lialda 438 G/d (started 5/20/24), RX Adherence: Adherent   Additional pertinent information (recent lab, scope, imaging, etc.):   4/21/25 Stool jennifer <5   Next OV: 4/2026  Provider: Dr. Miranda updated    "

## 2025-07-23 ENCOUNTER — LAB VISIT (OUTPATIENT)
Dept: LAB | Facility: HOSPITAL | Age: 35
End: 2025-07-23
Attending: INTERNAL MEDICINE
Payer: COMMERCIAL

## 2025-07-23 DIAGNOSIS — K51.00 ULCERATIVE PANCOLITIS WITHOUT COMPLICATION: ICD-10-CM

## 2025-07-23 PROCEDURE — 83993 ASSAY FOR CALPROTECTIN FECAL: CPT

## 2025-07-24 LAB
CALPROTECTIN INTERP (OHS): ABNORMAL
CALPROTECTIN STOOL (OHS): 2210 ΜG/G

## 2025-07-25 ENCOUNTER — RESULTS FOLLOW-UP (OUTPATIENT)
Dept: GASTROENTEROLOGY | Facility: HOSPITAL | Age: 35
End: 2025-07-25
Payer: COMMERCIAL

## 2025-07-25 DIAGNOSIS — K51.00 ULCERATIVE PANCOLITIS WITHOUT COMPLICATION: Primary | ICD-10-CM

## 2025-08-08 DIAGNOSIS — K51.319 ULCERATIVE RECTOSIGMOIDITIS WITH COMPLICATION: ICD-10-CM

## 2025-08-08 RX ORDER — MESALAMINE 1.2 G/1
4.8 TABLET, DELAYED RELEASE ORAL
Qty: 120 TABLET | Refills: 5 | Status: SHIPPED | OUTPATIENT
Start: 2025-08-08 | End: 2026-02-04

## 2025-08-08 RX ORDER — MESALAMINE 1000 MG/1
1000 SUPPOSITORY RECTAL NIGHTLY
Qty: 30 SUPPOSITORY | Refills: 1 | Status: SHIPPED | OUTPATIENT
Start: 2025-08-08 | End: 2025-10-07

## 2025-08-08 NOTE — TELEPHONE ENCOUNTER
IBD Provider: Dr. Villarreal     Allergies reviewed  Rx pended for approval    OV: 4/2026 General GI to be scheduled

## 2025-08-12 DIAGNOSIS — K51.319 ULCERATIVE RECTOSIGMOIDITIS WITH COMPLICATION: ICD-10-CM

## 2025-08-12 RX ORDER — MESALAMINE 1.2 G/1
4.8 TABLET, DELAYED RELEASE ORAL
Qty: 120 TABLET | Refills: 7 | Status: SHIPPED | OUTPATIENT
Start: 2025-08-12 | End: 2025-08-12

## 2025-08-12 RX ORDER — MESALAMINE 1.2 G/1
4.8 TABLET, DELAYED RELEASE ORAL
Qty: 120 TABLET | Refills: 5 | Status: SHIPPED | OUTPATIENT
Start: 2025-08-12 | End: 2026-02-08

## 2025-09-03 ENCOUNTER — IMMUNIZATION (OUTPATIENT)
Dept: INTERNAL MEDICINE | Facility: CLINIC | Age: 35
End: 2025-09-03
Payer: COMMERCIAL

## 2025-09-03 DIAGNOSIS — Z23 NEED FOR VACCINATION: Primary | ICD-10-CM

## 2025-09-03 PROCEDURE — 90480 ADMN SARSCOV2 VAC 1/ONLY CMP: CPT | Mod: S$GLB,,, | Performed by: INTERNAL MEDICINE

## 2025-09-03 PROCEDURE — 91320 SARSCV2 VAC 30MCG TRS-SUC IM: CPT | Mod: S$GLB,,, | Performed by: INTERNAL MEDICINE
